# Patient Record
Sex: MALE | Race: WHITE | NOT HISPANIC OR LATINO | Employment: UNEMPLOYED | URBAN - NONMETROPOLITAN AREA
[De-identification: names, ages, dates, MRNs, and addresses within clinical notes are randomized per-mention and may not be internally consistent; named-entity substitution may affect disease eponyms.]

---

## 2018-07-30 ENCOUNTER — TRANSCRIBE ORDERS (OUTPATIENT)
Dept: ADMINISTRATIVE | Facility: HOSPITAL | Age: 53
End: 2018-07-30

## 2018-07-30 DIAGNOSIS — E10.621 TYPE 1 DIABETES MELLITUS WITH FOOT ULCER (HCC): Primary | ICD-10-CM

## 2018-07-30 DIAGNOSIS — L97.509 TYPE 1 DIABETES MELLITUS WITH FOOT ULCER (HCC): Primary | ICD-10-CM

## 2018-08-03 ENCOUNTER — HOSPITAL ENCOUNTER (OUTPATIENT)
Dept: MRI IMAGING | Facility: HOSPITAL | Age: 53
Discharge: HOME | End: 2018-08-03

## 2018-08-09 ENCOUNTER — HOSPITAL ENCOUNTER (OUTPATIENT)
Dept: MRI IMAGING | Facility: HOSPITAL | Age: 53
Discharge: HOME | End: 2018-08-09
Admitting: RADIOLOGY

## 2018-08-09 DIAGNOSIS — E10.621 TYPE 1 DIABETES MELLITUS WITH FOOT ULCER (HCC): ICD-10-CM

## 2018-08-09 DIAGNOSIS — L97.509 TYPE 1 DIABETES MELLITUS WITH FOOT ULCER (HCC): ICD-10-CM

## 2018-08-09 PROCEDURE — 73718 MRI LOWER EXTREMITY W/O DYE: CPT

## 2018-08-09 PROCEDURE — 73718 MRI LOWER EXTREMITY W/O DYE: CPT | Performed by: RADIOLOGY

## 2018-11-30 ENCOUNTER — TRANSCRIBE ORDERS (OUTPATIENT)
Dept: ADMINISTRATIVE | Facility: HOSPITAL | Age: 53
End: 2018-11-30

## 2018-11-30 DIAGNOSIS — M86.171 ACUTE OSTEOMYELITIS OF ANKLE AND FOOT, RIGHT (HCC): Primary | ICD-10-CM

## 2019-02-08 ENCOUNTER — HOSPITAL ENCOUNTER (OUTPATIENT)
Facility: HOSPITAL | Age: 54
Setting detail: HOSPITAL OUTPATIENT SURGERY
Discharge: HOME | End: 2019-02-08
Attending: OPHTHALMOLOGY | Admitting: OPHTHALMOLOGY

## 2019-02-08 ENCOUNTER — ANESTHESIA EVENT (OUTPATIENT)
Dept: PERIOP | Facility: HOSPITAL | Age: 54
End: 2019-02-08

## 2019-02-08 ENCOUNTER — ANESTHESIA (OUTPATIENT)
Dept: PERIOP | Facility: HOSPITAL | Age: 54
End: 2019-02-08

## 2019-02-08 VITALS
BODY MASS INDEX: 24.38 KG/M2 | RESPIRATION RATE: 20 BRPM | HEART RATE: 96 BPM | SYSTOLIC BLOOD PRESSURE: 117 MMHG | DIASTOLIC BLOOD PRESSURE: 69 MMHG | HEIGHT: 72 IN | TEMPERATURE: 98 F | WEIGHT: 180 LBS | OXYGEN SATURATION: 96 %

## 2019-02-08 PROBLEM — H25.11 CATARACT, NUCLEAR SCLEROTIC, RIGHT EYE: Status: ACTIVE | Noted: 2019-02-08

## 2019-02-08 LAB — GLUCOSE BLDC GLUCOMTR-MCNC: 244 MG/DL (ref 70–130)

## 2019-02-08 PROCEDURE — 82962 GLUCOSE BLOOD TEST: CPT

## 2019-02-08 PROCEDURE — V2632 POST CHMBR INTRAOCULAR LENS: HCPCS | Performed by: OPHTHALMOLOGY

## 2019-02-08 PROCEDURE — 25010000002 MIDAZOLAM PER 1 MG: Performed by: NURSE ANESTHETIST, CERTIFIED REGISTERED

## 2019-02-08 PROCEDURE — 25010000002 HYDROMORPHONE PER 4 MG: Performed by: NURSE ANESTHETIST, CERTIFIED REGISTERED

## 2019-02-08 PROCEDURE — 25010000002 KETOROLAC TROMETHAMINE PER 15 MG: Performed by: NURSE ANESTHETIST, CERTIFIED REGISTERED

## 2019-02-08 PROCEDURE — 25010000002 FENTANYL CITRATE (PF) 100 MCG/2ML SOLUTION: Performed by: NURSE ANESTHETIST, CERTIFIED REGISTERED

## 2019-02-08 DEVICE — IMPLANTABLE DEVICE: Type: IMPLANTABLE DEVICE | Site: EYE | Status: FUNCTIONAL

## 2019-02-08 RX ORDER — OXYCODONE HYDROCHLORIDE AND ACETAMINOPHEN 5; 325 MG/1; MG/1
1 TABLET ORAL ONCE AS NEEDED
Status: CANCELLED | OUTPATIENT
Start: 2019-02-08

## 2019-02-08 RX ORDER — BALANCED SALT SOLUTION ENRICHED WITH BICARBONATE, DEXTROSE, AND GLUTATHIONE
KIT INTRAOCULAR AS NEEDED
Status: DISCONTINUED | OUTPATIENT
Start: 2019-02-08 | End: 2019-02-08 | Stop reason: HOSPADM

## 2019-02-08 RX ORDER — TROPICAMIDE 10 MG/ML
1 SOLUTION/ DROPS OPHTHALMIC
Status: COMPLETED | OUTPATIENT
Start: 2019-02-08 | End: 2019-02-08

## 2019-02-08 RX ORDER — IPRATROPIUM BROMIDE AND ALBUTEROL SULFATE 2.5; .5 MG/3ML; MG/3ML
3 SOLUTION RESPIRATORY (INHALATION) ONCE AS NEEDED
Status: CANCELLED | OUTPATIENT
Start: 2019-02-08

## 2019-02-08 RX ORDER — HYDROMORPHONE HCL 110MG/55ML
PATIENT CONTROLLED ANALGESIA SYRINGE INTRAVENOUS AS NEEDED
Status: DISCONTINUED | OUTPATIENT
Start: 2019-02-08 | End: 2019-02-08 | Stop reason: SURG

## 2019-02-08 RX ORDER — MEPERIDINE HYDROCHLORIDE 25 MG/ML
INJECTION INTRAMUSCULAR; INTRAVENOUS; SUBCUTANEOUS AS NEEDED
Status: DISCONTINUED | OUTPATIENT
Start: 2019-02-08 | End: 2019-02-08 | Stop reason: SURG

## 2019-02-08 RX ORDER — MIDAZOLAM HYDROCHLORIDE 1 MG/ML
2 INJECTION INTRAMUSCULAR; INTRAVENOUS
Status: DISCONTINUED | OUTPATIENT
Start: 2019-02-08 | End: 2019-02-08 | Stop reason: HOSPADM

## 2019-02-08 RX ORDER — FENTANYL CITRATE 50 UG/ML
50 INJECTION, SOLUTION INTRAMUSCULAR; INTRAVENOUS
Status: CANCELLED | OUTPATIENT
Start: 2019-02-08

## 2019-02-08 RX ORDER — KETOROLAC TROMETHAMINE 30 MG/ML
INJECTION, SOLUTION INTRAMUSCULAR; INTRAVENOUS AS NEEDED
Status: DISCONTINUED | OUTPATIENT
Start: 2019-02-08 | End: 2019-02-08 | Stop reason: SURG

## 2019-02-08 RX ORDER — DICLOFENAC SODIUM 1 MG/ML
1 SOLUTION/ DROPS OPHTHALMIC ONCE
Status: COMPLETED | OUTPATIENT
Start: 2019-02-08 | End: 2019-02-08

## 2019-02-08 RX ORDER — FENTANYL CITRATE 50 UG/ML
INJECTION, SOLUTION INTRAMUSCULAR; INTRAVENOUS AS NEEDED
Status: DISCONTINUED | OUTPATIENT
Start: 2019-02-08 | End: 2019-02-08 | Stop reason: SURG

## 2019-02-08 RX ORDER — TETRACAINE HYDROCHLORIDE 5 MG/ML
SOLUTION OPHTHALMIC AS NEEDED
Status: DISCONTINUED | OUTPATIENT
Start: 2019-02-08 | End: 2019-02-08 | Stop reason: HOSPADM

## 2019-02-08 RX ORDER — SODIUM CHLORIDE 0.9 % (FLUSH) 0.9 %
3-10 SYRINGE (ML) INJECTION AS NEEDED
Status: DISCONTINUED | OUTPATIENT
Start: 2019-02-08 | End: 2019-02-08 | Stop reason: HOSPADM

## 2019-02-08 RX ORDER — CYCLOPENTOLATE HYDROCHLORIDE 10 MG/ML
1 SOLUTION/ DROPS OPHTHALMIC
Status: COMPLETED | OUTPATIENT
Start: 2019-02-08 | End: 2019-02-08

## 2019-02-08 RX ORDER — TETRACAINE HYDROCHLORIDE 5 MG/ML
1 SOLUTION OPHTHALMIC ONCE
Status: COMPLETED | OUTPATIENT
Start: 2019-02-08 | End: 2019-02-08

## 2019-02-08 RX ORDER — BUPRENORPHINE HYDROCHLORIDE AND NALOXONE HYDROCHLORIDE DIHYDRATE 8; 2 MG/1; MG/1
1 TABLET SUBLINGUAL DAILY
Status: ON HOLD | COMMUNITY
End: 2022-04-26

## 2019-02-08 RX ORDER — CIPROFLOXACIN HYDROCHLORIDE 3.5 MG/ML
1 SOLUTION/ DROPS TOPICAL
Status: COMPLETED | OUTPATIENT
Start: 2019-02-08 | End: 2019-02-08

## 2019-02-08 RX ORDER — PHENYLEPHRINE HCL 2.5 %
1 DROPS OPHTHALMIC (EYE)
Status: COMPLETED | OUTPATIENT
Start: 2019-02-08 | End: 2019-02-08

## 2019-02-08 RX ORDER — MIDAZOLAM HYDROCHLORIDE 1 MG/ML
INJECTION INTRAMUSCULAR; INTRAVENOUS AS NEEDED
Status: DISCONTINUED | OUTPATIENT
Start: 2019-02-08 | End: 2019-02-08 | Stop reason: SURG

## 2019-02-08 RX ORDER — MIDAZOLAM HYDROCHLORIDE 1 MG/ML
1 INJECTION INTRAMUSCULAR; INTRAVENOUS
Status: DISCONTINUED | OUTPATIENT
Start: 2019-02-08 | End: 2019-02-08 | Stop reason: HOSPADM

## 2019-02-08 RX ORDER — LIDOCAINE HYDROCHLORIDE 10 MG/ML
INJECTION, SOLUTION EPIDURAL; INFILTRATION; INTRACAUDAL; PERINEURAL AS NEEDED
Status: DISCONTINUED | OUTPATIENT
Start: 2019-02-08 | End: 2019-02-08 | Stop reason: HOSPADM

## 2019-02-08 RX ORDER — SODIUM CHLORIDE 0.9 % (FLUSH) 0.9 %
3 SYRINGE (ML) INJECTION EVERY 12 HOURS SCHEDULED
Status: DISCONTINUED | OUTPATIENT
Start: 2019-02-08 | End: 2019-02-08 | Stop reason: HOSPADM

## 2019-02-08 RX ORDER — MAGNESIUM HYDROXIDE 1200 MG/15ML
LIQUID ORAL AS NEEDED
Status: DISCONTINUED | OUTPATIENT
Start: 2019-02-08 | End: 2019-02-08 | Stop reason: HOSPADM

## 2019-02-08 RX ORDER — SODIUM CHLORIDE, SODIUM LACTATE, POTASSIUM CHLORIDE, CALCIUM CHLORIDE 600; 310; 30; 20 MG/100ML; MG/100ML; MG/100ML; MG/100ML
125 INJECTION, SOLUTION INTRAVENOUS CONTINUOUS
Status: DISCONTINUED | OUTPATIENT
Start: 2019-02-08 | End: 2019-02-08 | Stop reason: HOSPADM

## 2019-02-08 RX ORDER — SODIUM CHLORIDE 9 MG/ML
INJECTION, SOLUTION INTRAVENOUS CONTINUOUS PRN
Status: DISCONTINUED | OUTPATIENT
Start: 2019-02-08 | End: 2019-02-08 | Stop reason: SURG

## 2019-02-08 RX ORDER — MEPERIDINE HYDROCHLORIDE 25 MG/ML
12.5 INJECTION INTRAMUSCULAR; INTRAVENOUS; SUBCUTANEOUS
Status: CANCELLED | OUTPATIENT
Start: 2019-02-08 | End: 2019-02-09

## 2019-02-08 RX ORDER — LABETALOL HYDROCHLORIDE 5 MG/ML
INJECTION, SOLUTION INTRAVENOUS AS NEEDED
Status: DISCONTINUED | OUTPATIENT
Start: 2019-02-08 | End: 2019-02-08 | Stop reason: SURG

## 2019-02-08 RX ORDER — ERYTHROMYCIN 5 MG/G
OINTMENT OPHTHALMIC AS NEEDED
Status: DISCONTINUED | OUTPATIENT
Start: 2019-02-08 | End: 2019-02-08 | Stop reason: HOSPADM

## 2019-02-08 RX ORDER — ONDANSETRON 2 MG/ML
4 INJECTION INTRAMUSCULAR; INTRAVENOUS ONCE AS NEEDED
Status: CANCELLED | OUTPATIENT
Start: 2019-02-08

## 2019-02-08 RX ADMIN — FENTANYL CITRATE 100 MCG: 50 INJECTION INTRAMUSCULAR; INTRAVENOUS at 08:24

## 2019-02-08 RX ADMIN — FENTANYL CITRATE 100 MCG: 50 INJECTION INTRAMUSCULAR; INTRAVENOUS at 08:54

## 2019-02-08 RX ADMIN — TROPICAMIDE 1 DROP: 10 SOLUTION/ DROPS OPHTHALMIC at 07:48

## 2019-02-08 RX ADMIN — TROPICAMIDE 1 DROP: 10 SOLUTION/ DROPS OPHTHALMIC at 07:43

## 2019-02-08 RX ADMIN — FENTANYL CITRATE 100 MCG: 50 INJECTION INTRAMUSCULAR; INTRAVENOUS at 08:36

## 2019-02-08 RX ADMIN — MIDAZOLAM HYDROCHLORIDE 2 MG: 1 INJECTION, SOLUTION INTRAMUSCULAR; INTRAVENOUS at 08:24

## 2019-02-08 RX ADMIN — FENTANYL CITRATE 100 MCG: 50 INJECTION INTRAMUSCULAR; INTRAVENOUS at 09:14

## 2019-02-08 RX ADMIN — PHENYLEPHRINE HYDROCHLORIDE 1 DROP: 2.5 SOLUTION/ DROPS OPHTHALMIC at 07:48

## 2019-02-08 RX ADMIN — HYDROMORPHONE HYDROCHLORIDE 1 MG: 2 INJECTION, SOLUTION INTRAMUSCULAR; INTRAVENOUS; SUBCUTANEOUS at 09:26

## 2019-02-08 RX ADMIN — SODIUM CHLORIDE: 9 INJECTION, SOLUTION INTRAVENOUS at 08:18

## 2019-02-08 RX ADMIN — CIPROFLOXACIN HYDROCHLORIDE 1 DROP: 3 SOLUTION/ DROPS OPHTHALMIC at 07:48

## 2019-02-08 RX ADMIN — KETOROLAC TROMETHAMINE 30 MG: 30 INJECTION, SOLUTION INTRAMUSCULAR; INTRAVENOUS at 08:58

## 2019-02-08 RX ADMIN — TETRACAINE HYDROCHLORIDE 1 DROP: 5 SOLUTION OPHTHALMIC at 07:38

## 2019-02-08 RX ADMIN — PHENYLEPHRINE HYDROCHLORIDE 1 DROP: 2.5 SOLUTION/ DROPS OPHTHALMIC at 07:43

## 2019-02-08 RX ADMIN — CYCLOPENTOLATE HYDROCHLORIDE 1 DROP: 10 SOLUTION/ DROPS OPHTHALMIC at 07:43

## 2019-02-08 RX ADMIN — CIPROFLOXACIN HYDROCHLORIDE 1 DROP: 3 SOLUTION/ DROPS OPHTHALMIC at 07:43

## 2019-02-08 RX ADMIN — CIPROFLOXACIN HYDROCHLORIDE 1 DROP: 3 SOLUTION/ DROPS OPHTHALMIC at 07:38

## 2019-02-08 RX ADMIN — TROPICAMIDE 1 DROP: 10 SOLUTION/ DROPS OPHTHALMIC at 07:38

## 2019-02-08 RX ADMIN — MEPERIDINE HYDROCHLORIDE 25 MG: 25 INJECTION, SOLUTION INTRAMUSCULAR; INTRAVENOUS; SUBCUTANEOUS at 08:58

## 2019-02-08 RX ADMIN — PHENYLEPHRINE HYDROCHLORIDE 1 DROP: 2.5 SOLUTION/ DROPS OPHTHALMIC at 07:38

## 2019-02-08 RX ADMIN — HYDROMORPHONE HYDROCHLORIDE 1 MG: 2 INJECTION, SOLUTION INTRAMUSCULAR; INTRAVENOUS; SUBCUTANEOUS at 09:19

## 2019-02-08 RX ADMIN — LABETALOL HYDROCHLORIDE 5 MG: 5 INJECTION, SOLUTION INTRAVENOUS at 08:51

## 2019-02-08 RX ADMIN — DICLOFENAC SODIUM 1 DROP: 1 SOLUTION OPHTHALMIC at 07:38

## 2019-02-08 RX ADMIN — CYCLOPENTOLATE HYDROCHLORIDE 1 DROP: 10 SOLUTION/ DROPS OPHTHALMIC at 07:38

## 2019-02-08 NOTE — ANESTHESIA POSTPROCEDURE EVALUATION
Patient: Syed Grajeda    Procedure Summary     Date:  02/08/19 Room / Location:   [facility] 09 /  [facility]    Anesthesia Start:  0818 Anesthesia Stop:  0943    Procedure:  CATARACT PHACO EXTRACTION WITH INTRAOCULAR LENS IMPLANT (Right Eye) Diagnosis:  (H25.13)    Surgeon:  Tan Gonzalez MD Provider:  Austin Gooden MD    Anesthesia Type:  Not recorded ASA Status:  2          Anesthesia Type: No value filed.  Last vitals  BP   117/69 (02/08/19 0956)   Temp   98 °F (36.7 °C) (02/08/19 0945)   Pulse   96 (02/08/19 0956)   Resp   20 (02/08/19 0956)     SpO2   96 % (02/08/19 0956)     Post Anesthesia Care and Evaluation    Patient location during evaluation: PHASE II  Patient participation: complete - patient participated  Level of consciousness: awake and alert  Pain score: 0  Pain management: adequate  Airway patency: patent  Anesthetic complications: No anesthetic complications    Cardiovascular status: acceptable  Respiratory status: acceptable  Hydration status: acceptable

## 2019-02-08 NOTE — OP NOTE
Cataract Surgery Procedure Note     Pre-op diagnosis and indications:  Visually significant ns cataract and posterior subcapsular cataract right eye, pupil miosis    Post-op diagnosis:  Same    Procedure performed:  Extracapsular cataract extraction using phacoemulsification and posterior chamber intra-ocular lens implant right eye, complex requiring iris retractors and capsular staining dye.    Surgeon: Tan Gonzalez M.D.    Anesthesia:  Topical tetracaine drops, intracameral lidocaine, and monitored anesthesia care.    Complications:  No apparent pre-operative, intra-operative, or immediate post-operative complications.    Lens implant used:  Technis PCBOO with dioptric power of 21.5    Specimens:  Not applicable    Blood loss:  None     Blood Products: NA    Grafts or Implants: see above    Description of procedure:     The patient was informed of the risks, indications, and alternative to this elective surgical procedure to perform cataract removal and intraocular lens implantation in the affected eye.  All of the patients and patient's caretakers questions have been answered to their satisfaction and an informed consent form has been reviewed with and signed by the patient and/or caretaker.  The informed consent form is in the chart.    The patient was instructed to clean the eyelashes with baby shampoo each day for several days before surgery and to apply antibiotic ointment to the eyelashes at bedtime for several days before surgery.  The patient was instructed to use an antibiotic drop in the operative eye four times a day for 3 days before surgery.      On the day of surgery, the patient was brought to the pre-operative holding area and given dilating drops (cyclogyl, phenylephrine, and tropicamide) x 3 5 minutes apart.  The patient also received antibiotic drops and voltaren drops x 3 5 minutes apart in the pre-operative area.     The patient was brought to the operative room and placed in the  supine position. The patient was monitored by anesthesia care services throughout the procedure.      After performing the appropriate time-outs, the patient received a betadine prep, cleansing the eyelashes with sterile betadine, followed by cleansing of the periorbital skin with sterile betadine, followed by a few drops of betadine placed in the inferior fornix.  The eye was rinsed with sterile saline and blotted dry with a sterile towel.  The patient was draped in the usual sterile fashion and sterile precautions were used throughout the procedure.    The eye was visualized through the microscope.  The pupil was still miotic despite the dilating drops.  A lid speculum was placed on the eyelids to open the lids.  A drop of sterile tetracaine was once again placed on the eye.  A small paracentesis was created with a 1 mm blade near the limbus.  Through this paracentesis an injection of a small amount 2 % non-preserved lidocaine was given into the anterior chamber with a cannula.  Also, viscoat was given with a cannula into the anterior chamber.  A 2.85 mm keratome blade was used to create a clear corneal tunnel incision near the limbus temporally. Additional viscoat was injected with a cannula into the anterior chamber.  A paracentesis was created near the limbus in each of the 4 quadrants and the greashobber iris retractors were used to dilate the pupil to allow the surgery to be performed.  In order to allow visualization of the capsule, the trypan blue dye was used to stain the anterior capsule and the excess dye was rinsed out of the eye with bss.  Viscoat was again injected with a cannula into the anterior chamber.  A cystitome was used to create a small tear in the central portion of the anterior capsule.  Utrata forceps were used to create an appropriate sized continuous curvilinear capsularhexis.  Gentle hydrodissection and hydrodilineation of the nucleus was performed with sterile balanced salt solution  with a cannula.  The phaco probe was used to carve a central groove in the nucleus.  Viscoat was injected into the grove.  The StepUp nucleus cracker was used to divide the nucleus into two sections.  Each section was gently phacoemulsified using as little phaco energy as possible.  Cortical clean up was then performed with the irrigation and aspiration probe.  An injection of a small amount of  provisc was given into the posterior capsule with a cannula.  A  was used to gently place the selected lens into the posterior capsule. The sue wand was used to gently rotate the lens into the proper position.  The lens appeared to be well centered in the posterior capsular bag.  Each of the 4 greashobber iris retractors was carefully removed.  The irrigation and aspiration probe was used to remove the provisc and viscoat and any remaining cortical material.  A final irrigation of the eye was performed with a cannula using balanced salt solution from the irrigation bottle.      The wound was checked and found to be water tight and secure.  The lid speculum was removed and the drapes were removed.  Tobradex ointment was given to the eye.  A shield was placed over the eye.      The patient was transported to the post-operative recovery room.  The patient tolerated the procedure well and there were no apparent pre-operative, intra-operative, or immediate post-operative complications.      The patient was instructed to follow up in the eye clinic in the afternoon (same day).  The patient was instructed to avoid all strenuous activity and to keep the eye protected with the shield.  The patient was instructed to return to the eye clinic or the emergency room immediately for any problems.

## 2019-02-08 NOTE — ANESTHESIA PREPROCEDURE EVALUATION
Anesthesia Evaluation     no history of anesthetic complications:  NPO Solid Status: > 8 hours  NPO Liquid Status: > 8 hours           Airway   Mallampati: II  TM distance: >3 FB  Neck ROM: full  No difficulty expected  Dental    (+) poor dentition    Pulmonary - normal exam   Cardiovascular - normal exam        Neuro/Psych  GI/Hepatic/Renal/Endo    (+)  hiatal hernia, GERD,  diabetes mellitus type 1 poorly controlled,     Musculoskeletal     Abdominal  - normal exam   Substance History      OB/GYN          Other                        Anesthesia Plan    ASA 2     Anesthetic plan, all risks, benefits, and alternatives have been provided, discussed and informed consent has been obtained with: patient.

## 2019-02-08 NOTE — BRIEF OP NOTE
CATARACT PHACO EXTRACTION WITH INTRAOCULAR LENS IMPLANT  Progress Note    Syed Grajeda  2/8/2019    Pre-op Diagnosis:   H25.13, H25.041       Post-Op Diagnosis Codes:    Same, NS cataract right eye and psc cataract right eye       Procedure/CPT® Codes:      Procedure(s):  CATARACT PHACO EXTRACTION WITH INTRAOCULAR LENS IMPLANT    Surgeon(s):  Tan Gonzlaez MD    Anesthesia: Choice    Staff:   Circulator: Emilia Leong RN  Scrub Person: Mary Reyes; Criss Tamayo    Estimated Blood Loss: none    Urine Voided: * No values recorded between 2/8/2019  8:19 AM and 2/8/2019  9:35 AM *    Specimens:                None      Drains:      Findings: see op report    Complications: stable      Tan Gonzalez MD     Date: 2/8/2019  Time: 9:39 AM

## 2022-04-25 ENCOUNTER — PREP FOR SURGERY (OUTPATIENT)
Dept: OTHER | Facility: HOSPITAL | Age: 57
End: 2022-04-25

## 2022-04-25 ENCOUNTER — HOSPITAL ENCOUNTER (INPATIENT)
Facility: HOSPITAL | Age: 57
LOS: 18 days | Discharge: HOME HEALTH | End: 2022-05-13
Attending: INTERNAL MEDICINE | Admitting: INTERNAL MEDICINE

## 2022-04-25 DIAGNOSIS — Z89.611 S/P AKA (ABOVE KNEE AMPUTATION) UNILATERAL, RIGHT: ICD-10-CM

## 2022-04-25 DIAGNOSIS — Z89.611 S/P AKA (ABOVE KNEE AMPUTATION) UNILATERAL, RIGHT: Primary | ICD-10-CM

## 2022-04-25 DIAGNOSIS — H25.11 CATARACT, NUCLEAR SCLEROTIC, RIGHT EYE: Primary | ICD-10-CM

## 2022-04-25 LAB
GLUCOSE BLDC GLUCOMTR-MCNC: 126 MG/DL (ref 70–130)
GLUCOSE BLDC GLUCOMTR-MCNC: 129 MG/DL (ref 70–130)

## 2022-04-25 PROCEDURE — 25010000002 HEPARIN (PORCINE) PER 1000 UNITS: Performed by: INTERNAL MEDICINE

## 2022-04-25 PROCEDURE — 63710000001 ONDANSETRON PER 8 MG: Performed by: INTERNAL MEDICINE

## 2022-04-25 PROCEDURE — 82962 GLUCOSE BLOOD TEST: CPT

## 2022-04-25 RX ORDER — BISACODYL 10 MG
10 SUPPOSITORY, RECTAL RECTAL DAILY PRN
Status: DISCONTINUED | OUTPATIENT
Start: 2022-04-25 | End: 2022-05-13 | Stop reason: HOSPADM

## 2022-04-25 RX ORDER — GABAPENTIN 100 MG/1
200 CAPSULE ORAL EVERY 12 HOURS SCHEDULED
Status: CANCELLED | OUTPATIENT
Start: 2022-04-25

## 2022-04-25 RX ORDER — NICOTINE POLACRILEX 4 MG
15 LOZENGE BUCCAL
Status: CANCELLED | OUTPATIENT
Start: 2022-04-25

## 2022-04-25 RX ORDER — HEPARIN SODIUM 5000 [USP'U]/ML
5000 INJECTION, SOLUTION INTRAVENOUS; SUBCUTANEOUS EVERY 12 HOURS SCHEDULED
Status: CANCELLED | OUTPATIENT
Start: 2022-04-25

## 2022-04-25 RX ORDER — HEPARIN SODIUM 5000 [USP'U]/ML
5000 INJECTION, SOLUTION INTRAVENOUS; SUBCUTANEOUS EVERY 12 HOURS SCHEDULED
Status: DISCONTINUED | OUTPATIENT
Start: 2022-04-25 | End: 2022-05-13 | Stop reason: HOSPADM

## 2022-04-25 RX ORDER — ACETAMINOPHEN 325 MG/1
650 TABLET ORAL EVERY 4 HOURS PRN
Status: CANCELLED | OUTPATIENT
Start: 2022-04-25

## 2022-04-25 RX ORDER — GABAPENTIN 100 MG/1
200 CAPSULE ORAL EVERY 12 HOURS SCHEDULED
Status: DISCONTINUED | OUTPATIENT
Start: 2022-04-25 | End: 2022-04-26

## 2022-04-25 RX ORDER — POLYETHYLENE GLYCOL 3350 17 G/17G
17 POWDER, FOR SOLUTION ORAL DAILY PRN
Status: DISCONTINUED | OUTPATIENT
Start: 2022-04-25 | End: 2022-05-13 | Stop reason: HOSPADM

## 2022-04-25 RX ORDER — FAMOTIDINE 20 MG/1
20 TABLET, FILM COATED ORAL DAILY
Status: CANCELLED | OUTPATIENT
Start: 2022-04-25

## 2022-04-25 RX ORDER — POLYETHYLENE GLYCOL 3350 17 G/17G
17 POWDER, FOR SOLUTION ORAL DAILY PRN
Status: CANCELLED | OUTPATIENT
Start: 2022-04-25

## 2022-04-25 RX ORDER — MULTIPLE VITAMINS W/ MINERALS TAB 9MG-400MCG
1 TAB ORAL DAILY
Status: DISCONTINUED | OUTPATIENT
Start: 2022-04-26 | End: 2022-05-13 | Stop reason: HOSPADM

## 2022-04-25 RX ORDER — CALCIUM CARBONATE 200(500)MG
1 TABLET,CHEWABLE ORAL 2 TIMES DAILY PRN
Status: DISCONTINUED | OUTPATIENT
Start: 2022-04-25 | End: 2022-05-13 | Stop reason: HOSPADM

## 2022-04-25 RX ORDER — NICOTINE POLACRILEX 4 MG
15 LOZENGE BUCCAL
Status: DISCONTINUED | OUTPATIENT
Start: 2022-04-25 | End: 2022-04-26

## 2022-04-25 RX ORDER — ASPIRIN 81 MG/1
81 TABLET, CHEWABLE ORAL DAILY
Status: DISCONTINUED | OUTPATIENT
Start: 2022-04-26 | End: 2022-05-13 | Stop reason: HOSPADM

## 2022-04-25 RX ORDER — BUPRENORPHINE HYDROCHLORIDE AND NALOXONE HYDROCHLORIDE DIHYDRATE 8; 2 MG/1; MG/1
2 TABLET SUBLINGUAL DAILY
Status: COMPLETED | OUTPATIENT
Start: 2022-04-26 | End: 2022-05-08

## 2022-04-25 RX ORDER — FAMOTIDINE 20 MG/1
20 TABLET, FILM COATED ORAL DAILY
Status: DISCONTINUED | OUTPATIENT
Start: 2022-04-26 | End: 2022-05-13 | Stop reason: HOSPADM

## 2022-04-25 RX ORDER — FERROUS SULFATE 325(65) MG
325 TABLET ORAL 2 TIMES DAILY WITH MEALS
Status: DISCONTINUED | OUTPATIENT
Start: 2022-04-25 | End: 2022-05-13 | Stop reason: HOSPADM

## 2022-04-25 RX ORDER — ONDANSETRON 4 MG/1
4 TABLET, FILM COATED ORAL EVERY 6 HOURS PRN
Status: DISCONTINUED | OUTPATIENT
Start: 2022-04-25 | End: 2022-05-13 | Stop reason: HOSPADM

## 2022-04-25 RX ORDER — DEXTROSE MONOHYDRATE 25 G/50ML
25 INJECTION, SOLUTION INTRAVENOUS
Status: DISCONTINUED | OUTPATIENT
Start: 2022-04-25 | End: 2022-04-26

## 2022-04-25 RX ORDER — ASPIRIN 81 MG/1
81 TABLET, CHEWABLE ORAL DAILY
Status: CANCELLED | OUTPATIENT
Start: 2022-04-25

## 2022-04-25 RX ORDER — AMOXICILLIN AND CLAVULANATE POTASSIUM 875; 125 MG/1; MG/1
1 TABLET, FILM COATED ORAL EVERY 12 HOURS SCHEDULED
Status: DISCONTINUED | OUTPATIENT
Start: 2022-04-25 | End: 2022-04-28

## 2022-04-25 RX ORDER — ACETAMINOPHEN 325 MG/1
650 TABLET ORAL EVERY 4 HOURS PRN
Status: DISCONTINUED | OUTPATIENT
Start: 2022-04-25 | End: 2022-05-13 | Stop reason: HOSPADM

## 2022-04-25 RX ORDER — FERROUS SULFATE 325(65) MG
325 TABLET ORAL 2 TIMES DAILY WITH MEALS
Status: CANCELLED | OUTPATIENT
Start: 2022-04-25

## 2022-04-25 RX ORDER — DEXTROSE MONOHYDRATE 25 G/50ML
25 INJECTION, SOLUTION INTRAVENOUS
Status: CANCELLED | OUTPATIENT
Start: 2022-04-25

## 2022-04-25 RX ORDER — METHOCARBAMOL 750 MG/1
750 TABLET, FILM COATED ORAL EVERY 8 HOURS PRN
Status: DISCONTINUED | OUTPATIENT
Start: 2022-04-25 | End: 2022-04-28

## 2022-04-25 RX ORDER — FLUCONAZOLE 100 MG/1
100 TABLET ORAL EVERY 24 HOURS
Status: DISCONTINUED | OUTPATIENT
Start: 2022-04-26 | End: 2022-04-28

## 2022-04-25 RX ORDER — BUPRENORPHINE HYDROCHLORIDE AND NALOXONE HYDROCHLORIDE DIHYDRATE 8; 2 MG/1; MG/1
2 TABLET SUBLINGUAL DAILY
Status: CANCELLED | OUTPATIENT
Start: 2022-04-25 | End: 2022-05-02

## 2022-04-25 RX ORDER — ATORVASTATIN CALCIUM 40 MG/1
40 TABLET, FILM COATED ORAL NIGHTLY
Status: CANCELLED | OUTPATIENT
Start: 2022-04-25

## 2022-04-25 RX ORDER — CALCIUM CARBONATE 200(500)MG
1 TABLET,CHEWABLE ORAL 2 TIMES DAILY PRN
Status: CANCELLED | OUTPATIENT
Start: 2022-04-25

## 2022-04-25 RX ORDER — MULTIPLE VITAMINS W/ MINERALS TAB 9MG-400MCG
1 TAB ORAL DAILY
Status: CANCELLED | OUTPATIENT
Start: 2022-04-25

## 2022-04-25 RX ORDER — FLUCONAZOLE 100 MG/1
100 TABLET ORAL EVERY 24 HOURS
Status: CANCELLED | OUTPATIENT
Start: 2022-04-25 | End: 2022-04-28

## 2022-04-25 RX ORDER — ONDANSETRON 4 MG/1
4 TABLET, FILM COATED ORAL EVERY 6 HOURS PRN
Status: CANCELLED | OUTPATIENT
Start: 2022-04-25

## 2022-04-25 RX ORDER — METHOCARBAMOL 750 MG/1
750 TABLET, FILM COATED ORAL EVERY 8 HOURS PRN
Status: CANCELLED | OUTPATIENT
Start: 2022-04-25

## 2022-04-25 RX ORDER — BISACODYL 10 MG
10 SUPPOSITORY, RECTAL RECTAL DAILY PRN
Status: CANCELLED | OUTPATIENT
Start: 2022-04-25

## 2022-04-25 RX ORDER — AMOXICILLIN AND CLAVULANATE POTASSIUM 875; 125 MG/1; MG/1
1 TABLET, FILM COATED ORAL EVERY 12 HOURS SCHEDULED
Status: CANCELLED | OUTPATIENT
Start: 2022-04-25 | End: 2022-04-28

## 2022-04-25 RX ORDER — ATORVASTATIN CALCIUM 40 MG/1
40 TABLET, FILM COATED ORAL NIGHTLY
Status: DISCONTINUED | OUTPATIENT
Start: 2022-04-25 | End: 2022-05-13 | Stop reason: HOSPADM

## 2022-04-25 RX ADMIN — GABAPENTIN 200 MG: 100 CAPSULE ORAL at 20:13

## 2022-04-25 RX ADMIN — METOPROLOL TARTRATE 25 MG: 25 TABLET, FILM COATED ORAL at 20:13

## 2022-04-25 RX ADMIN — AMOXICILLIN AND CLAVULANATE POTASSIUM 1 TABLET: 875; 125 TABLET, FILM COATED ORAL at 20:11

## 2022-04-25 RX ADMIN — FERROUS SULFATE TAB 325 MG (65 MG ELEMENTAL FE) 325 MG: 325 (65 FE) TAB at 17:19

## 2022-04-25 RX ADMIN — ATORVASTATIN CALCIUM 40 MG: 40 TABLET, FILM COATED ORAL at 20:12

## 2022-04-25 RX ADMIN — HEPARIN SODIUM 5000 UNITS: 5000 INJECTION INTRAVENOUS; SUBCUTANEOUS at 20:14

## 2022-04-25 RX ADMIN — ONDANSETRON HYDROCHLORIDE 4 MG: 4 TABLET, FILM COATED ORAL at 16:13

## 2022-04-26 LAB
ALBUMIN SERPL-MCNC: 1.51 G/DL (ref 3.5–5.2)
ALBUMIN/GLOB SERPL: 0.3 G/DL
ALP SERPL-CCNC: 159 U/L (ref 39–117)
ALT SERPL W P-5'-P-CCNC: 39 U/L (ref 1–41)
ANION GAP SERPL CALCULATED.3IONS-SCNC: 6.4 MMOL/L (ref 5–15)
AST SERPL-CCNC: 55 U/L (ref 1–40)
BASOPHILS # BLD AUTO: 0.03 10*3/MM3 (ref 0–0.2)
BASOPHILS NFR BLD AUTO: 0.5 % (ref 0–1.5)
BILIRUB SERPL-MCNC: 0.2 MG/DL (ref 0–1.2)
BUN SERPL-MCNC: 5 MG/DL (ref 6–20)
BUN/CREAT SERPL: 6 (ref 7–25)
CALCIUM SPEC-SCNC: 7.5 MG/DL (ref 8.6–10.5)
CHLORIDE SERPL-SCNC: 104 MMOL/L (ref 98–107)
CO2 SERPL-SCNC: 28.6 MMOL/L (ref 22–29)
CREAT SERPL-MCNC: 0.83 MG/DL (ref 0.76–1.27)
DEPRECATED RDW RBC AUTO: 50.6 FL (ref 37–54)
EGFRCR SERPLBLD CKD-EPI 2021: 104 ML/MIN/1.73
EOSINOPHIL # BLD AUTO: 0.02 10*3/MM3 (ref 0–0.4)
EOSINOPHIL NFR BLD AUTO: 0.4 % (ref 0.3–6.2)
ERYTHROCYTE [DISTWIDTH] IN BLOOD BY AUTOMATED COUNT: 14.9 % (ref 12.3–15.4)
GLOBULIN UR ELPH-MCNC: 4.4 GM/DL
GLUCOSE BLDC GLUCOMTR-MCNC: 84 MG/DL (ref 70–130)
GLUCOSE BLDC GLUCOMTR-MCNC: 85 MG/DL (ref 70–130)
GLUCOSE SERPL-MCNC: 101 MG/DL (ref 65–99)
HCT VFR BLD AUTO: 23.1 % (ref 37.5–51)
HGB BLD-MCNC: 7 G/DL (ref 13–17.7)
IMM GRANULOCYTES # BLD AUTO: 0.02 10*3/MM3 (ref 0–0.05)
IMM GRANULOCYTES NFR BLD AUTO: 0.4 % (ref 0–0.5)
LYMPHOCYTES # BLD AUTO: 1.69 10*3/MM3 (ref 0.7–3.1)
LYMPHOCYTES NFR BLD AUTO: 30.7 % (ref 19.6–45.3)
MAGNESIUM SERPL-MCNC: 1.9 MG/DL (ref 1.6–2.6)
MCH RBC QN AUTO: 27.9 PG (ref 26.6–33)
MCHC RBC AUTO-ENTMCNC: 30.3 G/DL (ref 31.5–35.7)
MCV RBC AUTO: 92 FL (ref 79–97)
MONOCYTES # BLD AUTO: 0.34 10*3/MM3 (ref 0.1–0.9)
MONOCYTES NFR BLD AUTO: 6.2 % (ref 5–12)
NEUTROPHILS NFR BLD AUTO: 3.4 10*3/MM3 (ref 1.7–7)
NEUTROPHILS NFR BLD AUTO: 61.8 % (ref 42.7–76)
NRBC BLD AUTO-RTO: 0 /100 WBC (ref 0–0.2)
PLATELET # BLD AUTO: 369 10*3/MM3 (ref 140–450)
PMV BLD AUTO: 10.4 FL (ref 6–12)
POTASSIUM SERPL-SCNC: 4.2 MMOL/L (ref 3.5–5.2)
PROT SERPL-MCNC: 5.9 G/DL (ref 6–8.5)
RBC # BLD AUTO: 2.51 10*6/MM3 (ref 4.14–5.8)
SODIUM SERPL-SCNC: 139 MMOL/L (ref 136–145)
WBC NRBC COR # BLD: 5.5 10*3/MM3 (ref 3.4–10.8)

## 2022-04-26 PROCEDURE — 63710000001 ONDANSETRON PER 8 MG: Performed by: INTERNAL MEDICINE

## 2022-04-26 PROCEDURE — C1751 CATH, INF, PER/CENT/MIDLINE: HCPCS

## 2022-04-26 PROCEDURE — 97535 SELF CARE MNGMENT TRAINING: CPT

## 2022-04-26 PROCEDURE — 97530 THERAPEUTIC ACTIVITIES: CPT

## 2022-04-26 PROCEDURE — 25010000002 HEPARIN (PORCINE) PER 1000 UNITS: Performed by: INTERNAL MEDICINE

## 2022-04-26 PROCEDURE — 82962 GLUCOSE BLOOD TEST: CPT

## 2022-04-26 PROCEDURE — 36410 VNPNXR 3YR/> PHY/QHP DX/THER: CPT

## 2022-04-26 PROCEDURE — 83735 ASSAY OF MAGNESIUM: CPT | Performed by: INTERNAL MEDICINE

## 2022-04-26 PROCEDURE — 97167 OT EVAL HIGH COMPLEX 60 MIN: CPT

## 2022-04-26 PROCEDURE — 97161 PT EVAL LOW COMPLEX 20 MIN: CPT

## 2022-04-26 PROCEDURE — 85025 COMPLETE CBC W/AUTO DIFF WBC: CPT | Performed by: INTERNAL MEDICINE

## 2022-04-26 PROCEDURE — 80053 COMPREHEN METABOLIC PANEL: CPT | Performed by: INTERNAL MEDICINE

## 2022-04-26 RX ORDER — TAMSULOSIN HYDROCHLORIDE 0.4 MG/1
1 CAPSULE ORAL DAILY
COMMUNITY
End: 2022-05-13 | Stop reason: HOSPADM

## 2022-04-26 RX ORDER — BUPRENORPHINE HYDROCHLORIDE AND NALOXONE HYDROCHLORIDE DIHYDRATE 8; 2 MG/1; MG/1
2 TABLET SUBLINGUAL DAILY
COMMUNITY

## 2022-04-26 RX ORDER — PANTOPRAZOLE SODIUM 40 MG/1
40 TABLET, DELAYED RELEASE ORAL DAILY
COMMUNITY
End: 2022-05-13 | Stop reason: HOSPADM

## 2022-04-26 RX ORDER — FERROUS SULFATE 325(65) MG
325 TABLET ORAL 2 TIMES DAILY
COMMUNITY

## 2022-04-26 RX ORDER — INSULIN ASPART 100 [IU]/ML
0-7 INJECTION, SOLUTION INTRAVENOUS; SUBCUTANEOUS
Status: DISCONTINUED | OUTPATIENT
Start: 2022-04-26 | End: 2022-04-26

## 2022-04-26 RX ORDER — ATORVASTATIN CALCIUM 40 MG/1
40 TABLET, FILM COATED ORAL DAILY
COMMUNITY

## 2022-04-26 RX ORDER — INSULIN GLARGINE 100 [IU]/ML
20 INJECTION, SOLUTION SUBCUTANEOUS NIGHTLY
COMMUNITY
End: 2022-05-13 | Stop reason: HOSPADM

## 2022-04-26 RX ORDER — GABAPENTIN 300 MG/1
300 CAPSULE ORAL DAILY
COMMUNITY
Start: 2022-04-22 | End: 2022-05-13 | Stop reason: HOSPADM

## 2022-04-26 RX ORDER — GABAPENTIN 100 MG/1
100 CAPSULE ORAL EVERY 12 HOURS SCHEDULED
Status: DISCONTINUED | OUTPATIENT
Start: 2022-04-26 | End: 2022-04-28

## 2022-04-26 RX ORDER — HYDROXYZINE PAMOATE 25 MG/1
25 CAPSULE ORAL 4 TIMES DAILY PRN
COMMUNITY
End: 2022-05-13 | Stop reason: HOSPADM

## 2022-04-26 RX ORDER — CHOLESTYRAMINE LIGHT 4 G/5.7G
4 POWDER, FOR SUSPENSION ORAL
COMMUNITY
End: 2022-05-13 | Stop reason: HOSPADM

## 2022-04-26 RX ADMIN — GABAPENTIN 200 MG: 100 CAPSULE ORAL at 09:01

## 2022-04-26 RX ADMIN — FERROUS SULFATE TAB 325 MG (65 MG ELEMENTAL FE) 325 MG: 325 (65 FE) TAB at 08:57

## 2022-04-26 RX ADMIN — AMOXICILLIN AND CLAVULANATE POTASSIUM 1 TABLET: 875; 125 TABLET, FILM COATED ORAL at 08:57

## 2022-04-26 RX ADMIN — HEPARIN SODIUM 5000 UNITS: 5000 INJECTION INTRAVENOUS; SUBCUTANEOUS at 20:06

## 2022-04-26 RX ADMIN — AMOXICILLIN AND CLAVULANATE POTASSIUM 1 TABLET: 875; 125 TABLET, FILM COATED ORAL at 20:03

## 2022-04-26 RX ADMIN — ONDANSETRON HYDROCHLORIDE 4 MG: 4 TABLET, FILM COATED ORAL at 11:56

## 2022-04-26 RX ADMIN — BUPRENORPHINE HYDROCHLORIDE AND NALOXONE HYDROCHLORIDE DIHYDRATE 2 TABLET: 8; 2 TABLET SUBLINGUAL at 09:01

## 2022-04-26 RX ADMIN — HEPARIN SODIUM 5000 UNITS: 5000 INJECTION INTRAVENOUS; SUBCUTANEOUS at 08:58

## 2022-04-26 RX ADMIN — ATORVASTATIN CALCIUM 40 MG: 40 TABLET, FILM COATED ORAL at 20:04

## 2022-04-26 RX ADMIN — FERROUS SULFATE TAB 325 MG (65 MG ELEMENTAL FE) 325 MG: 325 (65 FE) TAB at 17:19

## 2022-04-26 RX ADMIN — SODIUM CHLORIDE 500 ML: 9 INJECTION, SOLUTION INTRAVENOUS at 18:06

## 2022-04-26 RX ADMIN — METOPROLOL TARTRATE 25 MG: 25 TABLET, FILM COATED ORAL at 20:05

## 2022-04-26 RX ADMIN — FLUCONAZOLE 100 MG: 100 TABLET ORAL at 08:57

## 2022-04-26 RX ADMIN — ASPIRIN 81 MG: 81 TABLET, CHEWABLE ORAL at 08:57

## 2022-04-26 RX ADMIN — GABAPENTIN 100 MG: 100 CAPSULE ORAL at 20:03

## 2022-04-26 RX ADMIN — Medication 1 TABLET: at 08:58

## 2022-04-26 RX ADMIN — FAMOTIDINE 20 MG: 20 TABLET, FILM COATED ORAL at 08:57

## 2022-04-27 ENCOUNTER — APPOINTMENT (OUTPATIENT)
Dept: ULTRASOUND IMAGING | Facility: HOSPITAL | Age: 57
End: 2022-04-27

## 2022-04-27 PROCEDURE — 97530 THERAPEUTIC ACTIVITIES: CPT

## 2022-04-27 PROCEDURE — 97112 NEUROMUSCULAR REEDUCATION: CPT

## 2022-04-27 PROCEDURE — 97535 SELF CARE MNGMENT TRAINING: CPT

## 2022-04-27 PROCEDURE — 97110 THERAPEUTIC EXERCISES: CPT

## 2022-04-27 PROCEDURE — 76705 ECHO EXAM OF ABDOMEN: CPT | Performed by: RADIOLOGY

## 2022-04-27 PROCEDURE — 76705 ECHO EXAM OF ABDOMEN: CPT

## 2022-04-27 PROCEDURE — 63710000001 ONDANSETRON PER 8 MG: Performed by: INTERNAL MEDICINE

## 2022-04-27 PROCEDURE — 25010000002 HEPARIN (PORCINE) PER 1000 UNITS: Performed by: INTERNAL MEDICINE

## 2022-04-27 RX ORDER — PROCHLORPERAZINE MALEATE 5 MG/1
5 TABLET ORAL EVERY 6 HOURS PRN
Status: DISCONTINUED | OUTPATIENT
Start: 2022-04-27 | End: 2022-04-30

## 2022-04-27 RX ADMIN — ATORVASTATIN CALCIUM 40 MG: 40 TABLET, FILM COATED ORAL at 20:56

## 2022-04-27 RX ADMIN — FERROUS SULFATE TAB 325 MG (65 MG ELEMENTAL FE) 325 MG: 325 (65 FE) TAB at 17:05

## 2022-04-27 RX ADMIN — HEPARIN SODIUM 5000 UNITS: 5000 INJECTION INTRAVENOUS; SUBCUTANEOUS at 20:57

## 2022-04-27 RX ADMIN — FAMOTIDINE 20 MG: 20 TABLET, FILM COATED ORAL at 08:57

## 2022-04-27 RX ADMIN — HEPARIN SODIUM 5000 UNITS: 5000 INJECTION INTRAVENOUS; SUBCUTANEOUS at 08:57

## 2022-04-27 RX ADMIN — SODIUM CHLORIDE 500 ML: 9 INJECTION, SOLUTION INTRAVENOUS at 06:30

## 2022-04-27 RX ADMIN — ASPIRIN 81 MG: 81 TABLET, CHEWABLE ORAL at 08:57

## 2022-04-27 RX ADMIN — ONDANSETRON HYDROCHLORIDE 4 MG: 4 TABLET, FILM COATED ORAL at 16:36

## 2022-04-27 RX ADMIN — GABAPENTIN 100 MG: 100 CAPSULE ORAL at 20:56

## 2022-04-27 RX ADMIN — FERROUS SULFATE TAB 325 MG (65 MG ELEMENTAL FE) 325 MG: 325 (65 FE) TAB at 08:57

## 2022-04-27 RX ADMIN — Medication 1 TABLET: at 08:57

## 2022-04-27 RX ADMIN — FLUCONAZOLE 100 MG: 100 TABLET ORAL at 08:57

## 2022-04-27 RX ADMIN — METOPROLOL TARTRATE 25 MG: 25 TABLET, FILM COATED ORAL at 20:56

## 2022-04-27 RX ADMIN — BUPRENORPHINE HYDROCHLORIDE AND NALOXONE HYDROCHLORIDE DIHYDRATE 2 TABLET: 8; 2 TABLET SUBLINGUAL at 08:57

## 2022-04-27 RX ADMIN — ONDANSETRON HYDROCHLORIDE 4 MG: 4 TABLET, FILM COATED ORAL at 08:57

## 2022-04-27 RX ADMIN — AMOXICILLIN AND CLAVULANATE POTASSIUM 1 TABLET: 875; 125 TABLET, FILM COATED ORAL at 08:57

## 2022-04-27 RX ADMIN — AMOXICILLIN AND CLAVULANATE POTASSIUM 1 TABLET: 875; 125 TABLET, FILM COATED ORAL at 20:56

## 2022-04-27 RX ADMIN — GABAPENTIN 100 MG: 100 CAPSULE ORAL at 08:57

## 2022-04-28 ENCOUNTER — APPOINTMENT (OUTPATIENT)
Dept: CT IMAGING | Facility: HOSPITAL | Age: 57
End: 2022-04-28

## 2022-04-28 LAB
ABO GROUP BLD: NORMAL
ABO GROUP BLD: NORMAL
ALBUMIN SERPL-MCNC: 1.59 G/DL (ref 3.5–5.2)
ALBUMIN/GLOB SERPL: 0.4 G/DL
ALP SERPL-CCNC: 151 U/L (ref 39–117)
ALT SERPL W P-5'-P-CCNC: 32 U/L (ref 1–41)
ANION GAP SERPL CALCULATED.3IONS-SCNC: 4.9 MMOL/L (ref 5–15)
AST SERPL-CCNC: 36 U/L (ref 1–40)
BASOPHILS # BLD AUTO: 0.02 10*3/MM3 (ref 0–0.2)
BASOPHILS NFR BLD AUTO: 0.4 % (ref 0–1.5)
BH BB BLOOD EXPIRATION DATE: NORMAL
BH BB BLOOD TYPE BARCODE: 6200
BH BB DISPENSE STATUS: NORMAL
BH BB PRODUCT CODE: NORMAL
BH BB UNIT NUMBER: NORMAL
BILIRUB SERPL-MCNC: 0.2 MG/DL (ref 0–1.2)
BLD GP AB SCN SERPL QL: NEGATIVE
BUN SERPL-MCNC: 7 MG/DL (ref 6–20)
BUN/CREAT SERPL: 8.3 (ref 7–25)
CALCIUM SPEC-SCNC: 7.3 MG/DL (ref 8.6–10.5)
CHLORIDE SERPL-SCNC: 106 MMOL/L (ref 98–107)
CO2 SERPL-SCNC: 29.1 MMOL/L (ref 22–29)
CREAT SERPL-MCNC: 0.84 MG/DL (ref 0.76–1.27)
CROSSMATCH INTERPRETATION: NORMAL
DEPRECATED RDW RBC AUTO: 50.6 FL (ref 37–54)
EGFRCR SERPLBLD CKD-EPI 2021: 103.6 ML/MIN/1.73
EOSINOPHIL # BLD AUTO: 0.04 10*3/MM3 (ref 0–0.4)
EOSINOPHIL NFR BLD AUTO: 0.9 % (ref 0.3–6.2)
ERYTHROCYTE [DISTWIDTH] IN BLOOD BY AUTOMATED COUNT: 14.9 % (ref 12.3–15.4)
GLOBULIN UR ELPH-MCNC: 4.1 GM/DL
GLUCOSE SERPL-MCNC: 84 MG/DL (ref 65–99)
HCT VFR BLD AUTO: 19.7 % (ref 37.5–51)
HCT VFR BLD AUTO: 25.4 % (ref 37.5–51)
HGB BLD-MCNC: 6.1 G/DL (ref 13–17.7)
HGB BLD-MCNC: 7.7 G/DL (ref 13–17.7)
IMM GRANULOCYTES # BLD AUTO: 0.01 10*3/MM3 (ref 0–0.05)
IMM GRANULOCYTES NFR BLD AUTO: 0.2 % (ref 0–0.5)
LYMPHOCYTES # BLD AUTO: 2.01 10*3/MM3 (ref 0.7–3.1)
LYMPHOCYTES NFR BLD AUTO: 43.1 % (ref 19.6–45.3)
MCH RBC QN AUTO: 28.6 PG (ref 26.6–33)
MCHC RBC AUTO-ENTMCNC: 31 G/DL (ref 31.5–35.7)
MCV RBC AUTO: 92.5 FL (ref 79–97)
MONOCYTES # BLD AUTO: 0.39 10*3/MM3 (ref 0.1–0.9)
MONOCYTES NFR BLD AUTO: 8.4 % (ref 5–12)
NEUTROPHILS NFR BLD AUTO: 2.19 10*3/MM3 (ref 1.7–7)
NEUTROPHILS NFR BLD AUTO: 47 % (ref 42.7–76)
NRBC BLD AUTO-RTO: 0 /100 WBC (ref 0–0.2)
PLATELET # BLD AUTO: 496 10*3/MM3 (ref 140–450)
PMV BLD AUTO: 9.3 FL (ref 6–12)
POTASSIUM SERPL-SCNC: 3.8 MMOL/L (ref 3.5–5.2)
PROT SERPL-MCNC: 5.7 G/DL (ref 6–8.5)
RBC # BLD AUTO: 2.13 10*6/MM3 (ref 4.14–5.8)
RH BLD: POSITIVE
RH BLD: POSITIVE
SODIUM SERPL-SCNC: 140 MMOL/L (ref 136–145)
T&S EXPIRATION DATE: NORMAL
UNIT  ABO: NORMAL
UNIT  RH: NORMAL
WBC NRBC COR # BLD: 4.66 10*3/MM3 (ref 3.4–10.8)

## 2022-04-28 PROCEDURE — 97530 THERAPEUTIC ACTIVITIES: CPT

## 2022-04-28 PROCEDURE — 80053 COMPREHEN METABOLIC PANEL: CPT | Performed by: INTERNAL MEDICINE

## 2022-04-28 PROCEDURE — 85014 HEMATOCRIT: CPT | Performed by: INTERNAL MEDICINE

## 2022-04-28 PROCEDURE — 97110 THERAPEUTIC EXERCISES: CPT

## 2022-04-28 PROCEDURE — 97112 NEUROMUSCULAR REEDUCATION: CPT

## 2022-04-28 PROCEDURE — 25010000002 HEPARIN (PORCINE) PER 1000 UNITS: Performed by: INTERNAL MEDICINE

## 2022-04-28 PROCEDURE — 74176 CT ABD & PELVIS W/O CONTRAST: CPT | Performed by: RADIOLOGY

## 2022-04-28 PROCEDURE — 86900 BLOOD TYPING SEROLOGIC ABO: CPT

## 2022-04-28 PROCEDURE — 86901 BLOOD TYPING SEROLOGIC RH(D): CPT

## 2022-04-28 PROCEDURE — 86901 BLOOD TYPING SEROLOGIC RH(D): CPT | Performed by: INTERNAL MEDICINE

## 2022-04-28 PROCEDURE — 36430 TRANSFUSION BLD/BLD COMPNT: CPT

## 2022-04-28 PROCEDURE — 86850 RBC ANTIBODY SCREEN: CPT | Performed by: INTERNAL MEDICINE

## 2022-04-28 PROCEDURE — 85018 HEMOGLOBIN: CPT | Performed by: INTERNAL MEDICINE

## 2022-04-28 PROCEDURE — 63710000001 PROCHLORPERAZINE MALEATE PER 5 MG: Performed by: INTERNAL MEDICINE

## 2022-04-28 PROCEDURE — 86900 BLOOD TYPING SEROLOGIC ABO: CPT | Performed by: INTERNAL MEDICINE

## 2022-04-28 PROCEDURE — 74176 CT ABD & PELVIS W/O CONTRAST: CPT

## 2022-04-28 PROCEDURE — 86923 COMPATIBILITY TEST ELECTRIC: CPT

## 2022-04-28 PROCEDURE — 85025 COMPLETE CBC W/AUTO DIFF WBC: CPT | Performed by: INTERNAL MEDICINE

## 2022-04-28 PROCEDURE — 97535 SELF CARE MNGMENT TRAINING: CPT

## 2022-04-28 PROCEDURE — P9016 RBC LEUKOCYTES REDUCED: HCPCS

## 2022-04-28 RX ORDER — SODIUM CHLORIDE 9 MG/ML
INJECTION, SOLUTION INTRAVENOUS
Status: COMPLETED
Start: 2022-04-28 | End: 2022-04-28

## 2022-04-28 RX ORDER — METHOCARBAMOL 500 MG/1
500 TABLET, FILM COATED ORAL EVERY 12 HOURS PRN
Status: DISCONTINUED | OUTPATIENT
Start: 2022-04-28 | End: 2022-05-13 | Stop reason: HOSPADM

## 2022-04-28 RX ADMIN — HEPARIN SODIUM 5000 UNITS: 5000 INJECTION INTRAVENOUS; SUBCUTANEOUS at 08:18

## 2022-04-28 RX ADMIN — ATORVASTATIN CALCIUM 40 MG: 40 TABLET, FILM COATED ORAL at 20:57

## 2022-04-28 RX ADMIN — FAMOTIDINE 20 MG: 20 TABLET, FILM COATED ORAL at 08:18

## 2022-04-28 RX ADMIN — METOPROLOL TARTRATE 12.5 MG: 25 TABLET ORAL at 08:18

## 2022-04-28 RX ADMIN — PROCHLORPERAZINE MALEATE 5 MG: 5 TABLET ORAL at 10:41

## 2022-04-28 RX ADMIN — HEPARIN SODIUM 5000 UNITS: 5000 INJECTION INTRAVENOUS; SUBCUTANEOUS at 20:57

## 2022-04-28 RX ADMIN — BUPRENORPHINE HYDROCHLORIDE AND NALOXONE HYDROCHLORIDE DIHYDRATE 2 TABLET: 8; 2 TABLET SUBLINGUAL at 08:18

## 2022-04-28 RX ADMIN — POLYETHYLENE GLYCOL (3350) 17 G: 17 POWDER, FOR SOLUTION ORAL at 23:36

## 2022-04-28 RX ADMIN — Medication 1 TABLET: at 08:18

## 2022-04-28 RX ADMIN — FERROUS SULFATE TAB 325 MG (65 MG ELEMENTAL FE) 325 MG: 325 (65 FE) TAB at 08:18

## 2022-04-28 RX ADMIN — METHOCARBAMOL 500 MG: 500 TABLET, FILM COATED ORAL at 12:51

## 2022-04-28 RX ADMIN — METOPROLOL TARTRATE 12.5 MG: 25 TABLET ORAL at 20:57

## 2022-04-28 RX ADMIN — ASPIRIN 81 MG: 81 TABLET, CHEWABLE ORAL at 08:18

## 2022-04-28 RX ADMIN — FERROUS SULFATE TAB 325 MG (65 MG ELEMENTAL FE) 325 MG: 325 (65 FE) TAB at 16:42

## 2022-04-28 RX ADMIN — SODIUM CHLORIDE 250 ML: 9 INJECTION, SOLUTION INTRAVENOUS at 04:15

## 2022-04-29 PROCEDURE — 25010000002 HEPARIN (PORCINE) PER 1000 UNITS: Performed by: INTERNAL MEDICINE

## 2022-04-29 PROCEDURE — 63710000001 ONDANSETRON PER 8 MG: Performed by: INTERNAL MEDICINE

## 2022-04-29 PROCEDURE — 97530 THERAPEUTIC ACTIVITIES: CPT

## 2022-04-29 PROCEDURE — 97110 THERAPEUTIC EXERCISES: CPT

## 2022-04-29 PROCEDURE — 97535 SELF CARE MNGMENT TRAINING: CPT

## 2022-04-29 PROCEDURE — 97112 NEUROMUSCULAR REEDUCATION: CPT

## 2022-04-29 RX ADMIN — ONDANSETRON HYDROCHLORIDE 4 MG: 4 TABLET, FILM COATED ORAL at 22:40

## 2022-04-29 RX ADMIN — ONDANSETRON HYDROCHLORIDE 4 MG: 4 TABLET, FILM COATED ORAL at 12:06

## 2022-04-29 RX ADMIN — ASPIRIN 81 MG: 81 TABLET, CHEWABLE ORAL at 08:20

## 2022-04-29 RX ADMIN — HEPARIN SODIUM 5000 UNITS: 5000 INJECTION INTRAVENOUS; SUBCUTANEOUS at 08:20

## 2022-04-29 RX ADMIN — METOPROLOL TARTRATE 12.5 MG: 25 TABLET ORAL at 20:59

## 2022-04-29 RX ADMIN — Medication 1 TABLET: at 08:20

## 2022-04-29 RX ADMIN — FERROUS SULFATE TAB 325 MG (65 MG ELEMENTAL FE) 325 MG: 325 (65 FE) TAB at 17:07

## 2022-04-29 RX ADMIN — ATORVASTATIN CALCIUM 40 MG: 40 TABLET, FILM COATED ORAL at 20:59

## 2022-04-29 RX ADMIN — HEPARIN SODIUM 5000 UNITS: 5000 INJECTION INTRAVENOUS; SUBCUTANEOUS at 20:59

## 2022-04-29 RX ADMIN — BUPRENORPHINE HYDROCHLORIDE AND NALOXONE HYDROCHLORIDE DIHYDRATE 2 TABLET: 8; 2 TABLET SUBLINGUAL at 08:29

## 2022-04-29 RX ADMIN — FERROUS SULFATE TAB 325 MG (65 MG ELEMENTAL FE) 325 MG: 325 (65 FE) TAB at 08:20

## 2022-04-29 RX ADMIN — FAMOTIDINE 20 MG: 20 TABLET, FILM COATED ORAL at 08:20

## 2022-04-29 RX ADMIN — METOPROLOL TARTRATE 12.5 MG: 25 TABLET ORAL at 08:20

## 2022-04-29 RX ADMIN — METHOCARBAMOL 500 MG: 500 TABLET, FILM COATED ORAL at 20:59

## 2022-04-30 LAB
ALBUMIN SERPL-MCNC: 1.57 G/DL (ref 3.5–5.2)
ALBUMIN/GLOB SERPL: 0.4 G/DL
ALP SERPL-CCNC: 157 U/L (ref 39–117)
ALT SERPL W P-5'-P-CCNC: 26 U/L (ref 1–41)
ANION GAP SERPL CALCULATED.3IONS-SCNC: 7.7 MMOL/L (ref 5–15)
AST SERPL-CCNC: 31 U/L (ref 1–40)
BASOPHILS # BLD AUTO: 0.02 10*3/MM3 (ref 0–0.2)
BASOPHILS NFR BLD AUTO: 0.5 % (ref 0–1.5)
BILIRUB SERPL-MCNC: 0.2 MG/DL (ref 0–1.2)
BUN SERPL-MCNC: 8 MG/DL (ref 6–20)
BUN/CREAT SERPL: 10.7 (ref 7–25)
CALCIUM SPEC-SCNC: 7.5 MG/DL (ref 8.6–10.5)
CHLORIDE SERPL-SCNC: 105 MMOL/L (ref 98–107)
CO2 SERPL-SCNC: 27.3 MMOL/L (ref 22–29)
CREAT SERPL-MCNC: 0.75 MG/DL (ref 0.76–1.27)
DEPRECATED RDW RBC AUTO: 56.2 FL (ref 37–54)
EGFRCR SERPLBLD CKD-EPI 2021: 107.2 ML/MIN/1.73
EOSINOPHIL # BLD AUTO: 0.05 10*3/MM3 (ref 0–0.4)
EOSINOPHIL NFR BLD AUTO: 1.1 % (ref 0.3–6.2)
ERYTHROCYTE [DISTWIDTH] IN BLOOD BY AUTOMATED COUNT: 17.3 % (ref 12.3–15.4)
GLOBULIN UR ELPH-MCNC: 4.2 GM/DL
GLUCOSE BLDC GLUCOMTR-MCNC: 109 MG/DL (ref 70–130)
GLUCOSE BLDC GLUCOMTR-MCNC: 58 MG/DL (ref 70–130)
GLUCOSE BLDC GLUCOMTR-MCNC: 66 MG/DL (ref 70–130)
GLUCOSE BLDC GLUCOMTR-MCNC: 66 MG/DL (ref 70–130)
GLUCOSE BLDC GLUCOMTR-MCNC: 74 MG/DL (ref 70–130)
GLUCOSE BLDC GLUCOMTR-MCNC: 77 MG/DL (ref 70–130)
GLUCOSE BLDC GLUCOMTR-MCNC: 81 MG/DL (ref 70–130)
GLUCOSE SERPL-MCNC: 64 MG/DL (ref 65–99)
HCT VFR BLD AUTO: 24 % (ref 37.5–51)
HGB BLD-MCNC: 7.3 G/DL (ref 13–17.7)
IMM GRANULOCYTES # BLD AUTO: 0.01 10*3/MM3 (ref 0–0.05)
IMM GRANULOCYTES NFR BLD AUTO: 0.2 % (ref 0–0.5)
LYMPHOCYTES # BLD AUTO: 1.66 10*3/MM3 (ref 0.7–3.1)
LYMPHOCYTES NFR BLD AUTO: 37.6 % (ref 19.6–45.3)
MCH RBC QN AUTO: 27.7 PG (ref 26.6–33)
MCHC RBC AUTO-ENTMCNC: 30.4 G/DL (ref 31.5–35.7)
MCV RBC AUTO: 90.9 FL (ref 79–97)
MONOCYTES # BLD AUTO: 0.41 10*3/MM3 (ref 0.1–0.9)
MONOCYTES NFR BLD AUTO: 9.3 % (ref 5–12)
NEUTROPHILS NFR BLD AUTO: 2.27 10*3/MM3 (ref 1.7–7)
NEUTROPHILS NFR BLD AUTO: 51.3 % (ref 42.7–76)
NRBC BLD AUTO-RTO: 0 /100 WBC (ref 0–0.2)
PLATELET # BLD AUTO: 675 10*3/MM3 (ref 140–450)
PMV BLD AUTO: 9.8 FL (ref 6–12)
POTASSIUM SERPL-SCNC: 3.8 MMOL/L (ref 3.5–5.2)
PROT SERPL-MCNC: 5.8 G/DL (ref 6–8.5)
RBC # BLD AUTO: 2.64 10*6/MM3 (ref 4.14–5.8)
SODIUM SERPL-SCNC: 140 MMOL/L (ref 136–145)
WBC NRBC COR # BLD: 4.42 10*3/MM3 (ref 3.4–10.8)

## 2022-04-30 PROCEDURE — 97110 THERAPEUTIC EXERCISES: CPT

## 2022-04-30 PROCEDURE — 82962 GLUCOSE BLOOD TEST: CPT

## 2022-04-30 PROCEDURE — 99231 SBSQ HOSP IP/OBS SF/LOW 25: CPT | Performed by: FAMILY MEDICINE

## 2022-04-30 PROCEDURE — 97530 THERAPEUTIC ACTIVITIES: CPT

## 2022-04-30 PROCEDURE — 25010000002 HEPARIN (PORCINE) PER 1000 UNITS: Performed by: INTERNAL MEDICINE

## 2022-04-30 PROCEDURE — 63710000001 PROCHLORPERAZINE MALEATE PER 5 MG: Performed by: INTERNAL MEDICINE

## 2022-04-30 PROCEDURE — 80053 COMPREHEN METABOLIC PANEL: CPT | Performed by: INTERNAL MEDICINE

## 2022-04-30 PROCEDURE — 63710000001 ONDANSETRON PER 8 MG: Performed by: INTERNAL MEDICINE

## 2022-04-30 PROCEDURE — 85025 COMPLETE CBC W/AUTO DIFF WBC: CPT | Performed by: INTERNAL MEDICINE

## 2022-04-30 PROCEDURE — 97535 SELF CARE MNGMENT TRAINING: CPT

## 2022-04-30 RX ORDER — METOCLOPRAMIDE 10 MG/1
5 TABLET ORAL
Status: DISCONTINUED | OUTPATIENT
Start: 2022-04-30 | End: 2022-05-01

## 2022-04-30 RX ADMIN — FAMOTIDINE 20 MG: 20 TABLET, FILM COATED ORAL at 09:55

## 2022-04-30 RX ADMIN — METOCLOPRAMIDE 5 MG: 10 TABLET ORAL at 20:25

## 2022-04-30 RX ADMIN — PROCHLORPERAZINE MALEATE 5 MG: 5 TABLET ORAL at 05:53

## 2022-04-30 RX ADMIN — ATORVASTATIN CALCIUM 40 MG: 40 TABLET, FILM COATED ORAL at 20:25

## 2022-04-30 RX ADMIN — HEPARIN SODIUM 5000 UNITS: 5000 INJECTION INTRAVENOUS; SUBCUTANEOUS at 09:56

## 2022-04-30 RX ADMIN — ONDANSETRON HYDROCHLORIDE 4 MG: 4 TABLET, FILM COATED ORAL at 08:18

## 2022-04-30 RX ADMIN — ONDANSETRON HYDROCHLORIDE 4 MG: 4 TABLET, FILM COATED ORAL at 14:23

## 2022-04-30 RX ADMIN — Medication 1 TABLET: at 09:55

## 2022-04-30 RX ADMIN — ONDANSETRON HYDROCHLORIDE 4 MG: 4 TABLET, FILM COATED ORAL at 22:36

## 2022-04-30 RX ADMIN — FERROUS SULFATE TAB 325 MG (65 MG ELEMENTAL FE) 325 MG: 325 (65 FE) TAB at 17:15

## 2022-04-30 RX ADMIN — FERROUS SULFATE TAB 325 MG (65 MG ELEMENTAL FE) 325 MG: 325 (65 FE) TAB at 09:55

## 2022-04-30 RX ADMIN — ASPIRIN 81 MG: 81 TABLET, CHEWABLE ORAL at 09:55

## 2022-04-30 RX ADMIN — METOCLOPRAMIDE 5 MG: 10 TABLET ORAL at 11:45

## 2022-04-30 RX ADMIN — METOCLOPRAMIDE 5 MG: 10 TABLET ORAL at 17:15

## 2022-04-30 RX ADMIN — HEPARIN SODIUM 5000 UNITS: 5000 INJECTION INTRAVENOUS; SUBCUTANEOUS at 20:25

## 2022-04-30 RX ADMIN — METOPROLOL TARTRATE 12.5 MG: 25 TABLET ORAL at 20:25

## 2022-04-30 RX ADMIN — BUPRENORPHINE HYDROCHLORIDE AND NALOXONE HYDROCHLORIDE DIHYDRATE 2 TABLET: 8; 2 TABLET SUBLINGUAL at 10:28

## 2022-04-30 RX ADMIN — METOPROLOL TARTRATE 12.5 MG: 25 TABLET ORAL at 09:55

## 2022-05-01 LAB
ANION GAP SERPL CALCULATED.3IONS-SCNC: 6 MMOL/L (ref 5–15)
BASOPHILS # BLD AUTO: 0.01 10*3/MM3 (ref 0–0.2)
BASOPHILS NFR BLD AUTO: 0.2 % (ref 0–1.5)
BUN SERPL-MCNC: 9 MG/DL (ref 6–20)
BUN/CREAT SERPL: 11.7 (ref 7–25)
CALCIUM SPEC-SCNC: 7.8 MG/DL (ref 8.6–10.5)
CHLORIDE SERPL-SCNC: 103 MMOL/L (ref 98–107)
CO2 SERPL-SCNC: 29 MMOL/L (ref 22–29)
CREAT SERPL-MCNC: 0.77 MG/DL (ref 0.76–1.27)
DEPRECATED RDW RBC AUTO: 55.5 FL (ref 37–54)
EGFRCR SERPLBLD CKD-EPI 2021: 106.4 ML/MIN/1.73
EOSINOPHIL # BLD AUTO: 0.04 10*3/MM3 (ref 0–0.4)
EOSINOPHIL NFR BLD AUTO: 0.9 % (ref 0.3–6.2)
ERYTHROCYTE [DISTWIDTH] IN BLOOD BY AUTOMATED COUNT: 17.2 % (ref 12.3–15.4)
GLUCOSE BLDC GLUCOMTR-MCNC: 73 MG/DL (ref 70–130)
GLUCOSE BLDC GLUCOMTR-MCNC: 84 MG/DL (ref 70–130)
GLUCOSE BLDC GLUCOMTR-MCNC: 90 MG/DL (ref 70–130)
GLUCOSE BLDC GLUCOMTR-MCNC: 92 MG/DL (ref 70–130)
GLUCOSE BLDC GLUCOMTR-MCNC: 99 MG/DL (ref 70–130)
GLUCOSE SERPL-MCNC: 83 MG/DL (ref 65–99)
HCT VFR BLD AUTO: 24.9 % (ref 37.5–51)
HGB BLD-MCNC: 7.7 G/DL (ref 13–17.7)
IMM GRANULOCYTES # BLD AUTO: 0.01 10*3/MM3 (ref 0–0.05)
IMM GRANULOCYTES NFR BLD AUTO: 0.2 % (ref 0–0.5)
LYMPHOCYTES # BLD AUTO: 1.84 10*3/MM3 (ref 0.7–3.1)
LYMPHOCYTES NFR BLD AUTO: 41.9 % (ref 19.6–45.3)
MCH RBC QN AUTO: 28.2 PG (ref 26.6–33)
MCHC RBC AUTO-ENTMCNC: 30.9 G/DL (ref 31.5–35.7)
MCV RBC AUTO: 91.2 FL (ref 79–97)
MONOCYTES # BLD AUTO: 0.41 10*3/MM3 (ref 0.1–0.9)
MONOCYTES NFR BLD AUTO: 9.3 % (ref 5–12)
NEUTROPHILS NFR BLD AUTO: 2.08 10*3/MM3 (ref 1.7–7)
NEUTROPHILS NFR BLD AUTO: 47.5 % (ref 42.7–76)
NRBC BLD AUTO-RTO: 0 /100 WBC (ref 0–0.2)
PLATELET # BLD AUTO: 725 10*3/MM3 (ref 140–450)
PMV BLD AUTO: 9.8 FL (ref 6–12)
POTASSIUM SERPL-SCNC: 3.8 MMOL/L (ref 3.5–5.2)
RBC # BLD AUTO: 2.73 10*6/MM3 (ref 4.14–5.8)
SODIUM SERPL-SCNC: 138 MMOL/L (ref 136–145)
WBC NRBC COR # BLD: 4.39 10*3/MM3 (ref 3.4–10.8)

## 2022-05-01 PROCEDURE — 82962 GLUCOSE BLOOD TEST: CPT

## 2022-05-01 PROCEDURE — 85025 COMPLETE CBC W/AUTO DIFF WBC: CPT | Performed by: FAMILY MEDICINE

## 2022-05-01 PROCEDURE — 25010000002 HEPARIN (PORCINE) PER 1000 UNITS: Performed by: INTERNAL MEDICINE

## 2022-05-01 PROCEDURE — 80048 BASIC METABOLIC PNL TOTAL CA: CPT | Performed by: FAMILY MEDICINE

## 2022-05-01 PROCEDURE — 63710000001 ONDANSETRON PER 8 MG: Performed by: INTERNAL MEDICINE

## 2022-05-01 PROCEDURE — 99231 SBSQ HOSP IP/OBS SF/LOW 25: CPT | Performed by: FAMILY MEDICINE

## 2022-05-01 RX ORDER — METOCLOPRAMIDE 10 MG/1
10 TABLET ORAL
Status: DISCONTINUED | OUTPATIENT
Start: 2022-05-01 | End: 2022-05-02

## 2022-05-01 RX ADMIN — METOCLOPRAMIDE 10 MG: 10 TABLET ORAL at 16:52

## 2022-05-01 RX ADMIN — ATORVASTATIN CALCIUM 40 MG: 40 TABLET, FILM COATED ORAL at 20:50

## 2022-05-01 RX ADMIN — ONDANSETRON HYDROCHLORIDE 4 MG: 4 TABLET, FILM COATED ORAL at 22:56

## 2022-05-01 RX ADMIN — METOCLOPRAMIDE 10 MG: 10 TABLET ORAL at 11:58

## 2022-05-01 RX ADMIN — METOPROLOL TARTRATE 12.5 MG: 25 TABLET ORAL at 08:37

## 2022-05-01 RX ADMIN — METOPROLOL TARTRATE 12.5 MG: 25 TABLET ORAL at 20:50

## 2022-05-01 RX ADMIN — METOCLOPRAMIDE 10 MG: 10 TABLET ORAL at 20:50

## 2022-05-01 RX ADMIN — HEPARIN SODIUM 5000 UNITS: 5000 INJECTION INTRAVENOUS; SUBCUTANEOUS at 20:50

## 2022-05-01 RX ADMIN — ONDANSETRON HYDROCHLORIDE 4 MG: 4 TABLET, FILM COATED ORAL at 08:37

## 2022-05-01 RX ADMIN — FERROUS SULFATE TAB 325 MG (65 MG ELEMENTAL FE) 325 MG: 325 (65 FE) TAB at 08:38

## 2022-05-01 RX ADMIN — HEPARIN SODIUM 5000 UNITS: 5000 INJECTION INTRAVENOUS; SUBCUTANEOUS at 08:40

## 2022-05-01 RX ADMIN — FAMOTIDINE 20 MG: 20 TABLET, FILM COATED ORAL at 08:38

## 2022-05-01 RX ADMIN — ONDANSETRON HYDROCHLORIDE 4 MG: 4 TABLET, FILM COATED ORAL at 16:52

## 2022-05-01 RX ADMIN — Medication 1 TABLET: at 08:38

## 2022-05-01 RX ADMIN — ASPIRIN 81 MG: 81 TABLET, CHEWABLE ORAL at 08:37

## 2022-05-01 RX ADMIN — FERROUS SULFATE TAB 325 MG (65 MG ELEMENTAL FE) 325 MG: 325 (65 FE) TAB at 17:40

## 2022-05-01 RX ADMIN — BUPRENORPHINE HYDROCHLORIDE AND NALOXONE HYDROCHLORIDE DIHYDRATE 2 TABLET: 8; 2 TABLET SUBLINGUAL at 08:36

## 2022-05-01 RX ADMIN — METOCLOPRAMIDE 5 MG: 10 TABLET ORAL at 06:35

## 2022-05-01 NOTE — PROGRESS NOTES
Rehabilitation Nursing  Inpatient Rehabilitation Plan of Care Note    Plan of Care  Copy from Peggy    Pain Management (Active)  Current Status (4/25/2022 3:25:00 PM): Potential for increased pain  Weekly Goal: Tolerable pain level  Discharge Goal: No pain    Body Function Structure    Skin Integrity (Active)  Current Status (4/25/2022 3:25:00 PM): Impaired skin integrity  Weekly Goal: No more skin breakdown/improvement of skin integrity  Discharge Goal: Improved skin integrity    Safety    Potential for Injury (Active)  Current Status (4/25/2022 3:25:00 PM): Potential for falls  Weekly Goal: No falls  Discharge Goal: No falls    Signed by: Duglas Prince RN

## 2022-05-01 NOTE — PROGRESS NOTES
[facility]  PROGRESS NOTE     Patient Identification:  Name:  Syed Grajeda  Age:  56 y.o.  Sex:  male  :  1965  MRN:  [id number]  Visit Number:  [id number]  ROOM: [facility]     Primary Care Provider:  Zheng Serrato MD    Length of stay in inpatient status:  6    Subjective     Chief Compliant:  No chief complaint on file.      History of Presenting Illness: 56-year-old gentleman with history of right AKA, left BKA, diabetes mellitus, ASCVD, sacral decubitus wound, opioid dependency, chronic pain disorder, neuropathy.  Patient complaint continues complain of some nausea.  Has no new complaints otherwise    Objective     Current Hospital Meds:aspirin, 81 mg, Oral, Daily  atorvastatin, 40 mg, Oral, Nightly  buprenorphine-naloxone, 2 tablet, Sublingual, Daily  famotidine, 20 mg, Oral, Daily  ferrous sulfate, 325 mg, Oral, BID With Meals  heparin (porcine), 5,000 Units, Subcutaneous, Q12H  metoclopramide, 10 mg, Oral, 4x Daily AC & at Bedtime  metoprolol tartrate, 12.5 mg, Oral, Q12H  multivitamin with minerals, 1 tablet, Oral, Daily       ----------------------------------------------------------------------------------------------------------------------  Vital Signs:  Temp:  [97.6 °F (36.4 °C)-98 °F (36.7 °C)] 97.6 °F (36.4 °C)  Heart Rate:  [80-88] 80  Resp:  [18] 18  BP: (118-122)/(63-72) 122/63  SpO2:  [96 %-97 %] 97 %  on   ;   Device (Oxygen Therapy): room air  Body mass index is 46.39 kg/m².    Wt Readings from Last 3 Encounters:   22 74.8 kg (164 lb 15.5 oz)   19 81.6 kg (180 lb)     Intake & Output (last 3 days)        07    P.O. 7069 147 6498 240    I.V. (mL/kg)        Blood        Total Intake(mL/kg) 1080 (14.4) 720 (9.6) 1200 (16) 240 (3.2)    Urine (mL/kg/hr) 1450 (0.8) 425 (0.2) 500 (0.3) 300 (1.1)    Stool 0 0      Total Output 1450 425 500 300    Net -370 +295 +700 -60             Urine Unmeasured Occurrence 1 x 1 x 2 x     Stool Unmeasured Occurrence 4 x 3 x 1 x         Diet Soft Texture; Ground; Thin; Cardiac, Consistent Carbohydrate  ----------------------------------------------------------------------------------------------------------------------  Physical exam:  Constitutional:   Frail patient who appears older than stated age in no distress  HEENT: Normocephalic atraumatic  Neck: Supple   Cardiovascular: Regular rate and rhythm  Pulmonary/Chest: Clear to auscultation  Abdominal: Positive bowel sounds soft.   Musculoskeletal: No arthropathy  Neurological: No focal deficits  Skin: No rash  Peripheral vascular:  Genitourinary:  ----------------------------------------------------------------------------------------------------------------------    Last echocardiogram:    ----------------------------------------------------------------------------------------------------------------------  Results from last 7 days   Lab Units 05/01/22  0105 04/30/22  0049 04/28/22  1123 04/28/22 0042   WBC 10*3/mm3 4.39 4.42  --  4.66   HEMOGLOBIN g/dL 7.7* 7.3* 7.7* 6.1*   HEMATOCRIT % 24.9* 24.0* 25.4* 19.7*   MCV fL 91.2 90.9  --  92.5   MCHC g/dL 30.9* 30.4*  --  31.0*   PLATELETS 10*3/mm3 725* 675*  --  496*         Results from last 7 days   Lab Units 05/01/22  0105 04/30/22  0049 04/28/22  0042 04/26/22  0008   SODIUM mmol/L 138 140 140 139   POTASSIUM mmol/L 3.8 3.8 3.8 4.2   MAGNESIUM mg/dL  --   --   --  1.9   CHLORIDE mmol/L 103 105 106 104   CO2 mmol/L 29.0 27.3 29.1* 28.6   BUN mg/dL 9 8 7 5*   CREATININE mg/dL 0.77 0.75* 0.84 0.83   CALCIUM mg/dL 7.8* 7.5* 7.3* 7.5*   GLUCOSE mg/dL 83 64* 84 101*   ALBUMIN g/dL  --  1.57* 1.59* 1.51*   BILIRUBIN mg/dL  --  0.2 0.2 0.2   ALK PHOS U/L  --  157* 151* 159*   AST (SGOT) U/L  --  31 36 55*   ALT (SGPT) U/L  --  26 32 39   Estimated Creatinine Clearance: 81 mL/min (by C-G formula based on SCr of 0.77 mg/dL).  No results found for:  AMMONIA              Glucose   Date/Time Value Ref Range Status   05/01/2022 0606 84 70 - 130 mg/dL Final     Comment:     Meter: [id number] : [id number] Deuce Crystal   04/30/2022 2018 109 70 - 130 mg/dL Final     Comment:     Meter: [id number] : [id number] Deuce Crystal   04/30/2022 1739 77 70 - 130 mg/dL Final     Comment:     Meter: [id number] : [id number] linda nyla   04/30/2022 1603 66 (L) 70 - 130 mg/dL Final     Comment:     Meter: [id number] : [id number] linda nyla   04/30/2022 1247 81 70 - 130 mg/dL Final     Comment:     Meter: [id number] : [id number] linda nyla   04/30/2022 1107 66 (L) 70 - 130 mg/dL Final     Comment:     Meter: [id number] : [id number] Mindy Yuen   04/30/2022 0902 74 70 - 130 mg/dL Final     Comment:     Meter: [id number] : [id number] Mindy Alpa   04/30/2022 0813 58 (L) 70 - 130 mg/dL Final     Comment:     Meter: [id number] : [id number] Mindy Yuen     No results found for: TSH, FREET4  No results found for: PREGTESTUR, PREGSERUM, HCG, HCGQUANT  Pain Management Panel    There is no flowsheet data to display.       Brief Urine Lab Results     None        No results found for: BLOODCX      No results found for: URINECX  No results found for: WOUNDCX  No results found for: STOOLCX        I have personally looked at the labs and they are summarized above.  ----------------------------------------------------------------------------------------------------------------------  Detailed radiology reports for the last 24 hours:    Imaging Results (Last 24 Hours)     ** No results found for the last 24 hours. **        Final impressions for the last 30 days of radiology reports:    CT Abdomen Pelvis Without Contrast    Result Date: 4/28/2022   1. Consolidation in the right lung and small bilateral effusions, right greater than left.  2.Thickening of the urinary bladder wall.   3. Mild degree of anasarca.  4. Moderate to large volume  stool.     This report was finalized on 4/28/2022 4:59 PM by Dr. Km Adams MD.      US Liver    Result Date: 4/27/2022  Homogeneous echotexture of the liver.  This report was finalized on 4/27/2022 4:22 PM by Dr. Km Adams MD.      I have personally looked at the radiology images and read the final radiology report.    Assessment & Plan    Status post right AKA with remote history of left BKA--patient requiring maximum assistance for upper lower body dressing; set up for grooming; set up for self-feeding; minimum to moderate assistance for up with bed mobility; moderate to maximum assist for transfers; did work on motor skills yesterday.    Nausea--increase Reglan to 10 mg 4 times daily today and will follow    Anemia stable--hemoglobin is 7.7 this morning.    Thrombocytosis--may be secondary to anemia versus recent AKA.  Will monitor    Diabetes mellitus stable.  Currently not requiring medication    Sacral decubitus wound continue local care    Hypertension controlled    VTE Prophylaxis:   Mechanical Order History:     None      Pharmalogical Order History:      Ordered     Dose Route Frequency Stop    04/25/22 1529  heparin (porcine) 5000 UNIT/ML injection 5,000 Units         5,000 Units SC Every 12 Hours Scheduled --                    Manohar Piedra MD  [facility]  05/01/22  10:46 EDT

## 2022-05-01 NOTE — PLAN OF CARE
Goal Outcome Evaluation:  Plan of Care Reviewed With: patient        Progress: improving  Outcome Evaluation: Pt progressing with current POC.

## 2022-05-02 LAB
GLUCOSE BLDC GLUCOMTR-MCNC: 121 MG/DL (ref 70–130)
GLUCOSE BLDC GLUCOMTR-MCNC: 75 MG/DL (ref 70–130)
GLUCOSE BLDC GLUCOMTR-MCNC: 78 MG/DL (ref 70–130)
GLUCOSE BLDC GLUCOMTR-MCNC: 96 MG/DL (ref 70–130)

## 2022-05-02 PROCEDURE — 99231 SBSQ HOSP IP/OBS SF/LOW 25: CPT | Performed by: FAMILY MEDICINE

## 2022-05-02 PROCEDURE — 97110 THERAPEUTIC EXERCISES: CPT

## 2022-05-02 PROCEDURE — 97530 THERAPEUTIC ACTIVITIES: CPT

## 2022-05-02 PROCEDURE — 63710000001 ONDANSETRON PER 8 MG: Performed by: INTERNAL MEDICINE

## 2022-05-02 PROCEDURE — 82962 GLUCOSE BLOOD TEST: CPT

## 2022-05-02 PROCEDURE — 97535 SELF CARE MNGMENT TRAINING: CPT

## 2022-05-02 PROCEDURE — 25010000002 HEPARIN (PORCINE) PER 1000 UNITS: Performed by: INTERNAL MEDICINE

## 2022-05-02 RX ORDER — HYDROXYZINE HYDROCHLORIDE 25 MG/1
25 TABLET, FILM COATED ORAL 3 TIMES DAILY PRN
Status: DISCONTINUED | OUTPATIENT
Start: 2022-05-02 | End: 2022-05-13 | Stop reason: HOSPADM

## 2022-05-02 RX ADMIN — HEPARIN SODIUM 5000 UNITS: 5000 INJECTION INTRAVENOUS; SUBCUTANEOUS at 09:24

## 2022-05-02 RX ADMIN — METOPROLOL TARTRATE 12.5 MG: 25 TABLET ORAL at 20:56

## 2022-05-02 RX ADMIN — HYDROXYZINE HYDROCHLORIDE 25 MG: 25 TABLET ORAL at 11:42

## 2022-05-02 RX ADMIN — FERROUS SULFATE TAB 325 MG (65 MG ELEMENTAL FE) 325 MG: 325 (65 FE) TAB at 18:18

## 2022-05-02 RX ADMIN — ONDANSETRON HYDROCHLORIDE 4 MG: 4 TABLET, FILM COATED ORAL at 08:52

## 2022-05-02 RX ADMIN — Medication 1 TABLET: at 09:22

## 2022-05-02 RX ADMIN — METHOCARBAMOL 500 MG: 500 TABLET, FILM COATED ORAL at 05:05

## 2022-05-02 RX ADMIN — METOPROLOL TARTRATE 12.5 MG: 25 TABLET ORAL at 09:24

## 2022-05-02 RX ADMIN — ASPIRIN 81 MG: 81 TABLET, CHEWABLE ORAL at 09:22

## 2022-05-02 RX ADMIN — ATORVASTATIN CALCIUM 40 MG: 40 TABLET, FILM COATED ORAL at 20:56

## 2022-05-02 RX ADMIN — ACETAMINOPHEN 650 MG: 325 TABLET ORAL at 05:05

## 2022-05-02 RX ADMIN — BUPRENORPHINE HYDROCHLORIDE AND NALOXONE HYDROCHLORIDE DIHYDRATE 2 TABLET: 8; 2 TABLET SUBLINGUAL at 09:25

## 2022-05-02 RX ADMIN — FERROUS SULFATE TAB 325 MG (65 MG ELEMENTAL FE) 325 MG: 325 (65 FE) TAB at 09:23

## 2022-05-02 RX ADMIN — HEPARIN SODIUM 5000 UNITS: 5000 INJECTION INTRAVENOUS; SUBCUTANEOUS at 20:56

## 2022-05-02 RX ADMIN — METOCLOPRAMIDE 10 MG: 10 TABLET ORAL at 06:35

## 2022-05-02 RX ADMIN — FAMOTIDINE 20 MG: 20 TABLET, FILM COATED ORAL at 09:23

## 2022-05-02 NOTE — PROGRESS NOTES
Inpatient Rehabilitation Functional Measures Assessment and Plan of Care    Plan of Care  Self Care    [OT] Dressing (Upper)(Resolved)  Current Status(05/02/2022): setup  Weekly Goal(05/03/2022): Amy  Discharge Goal: setup    [OT] Dressing (Lower)(Active)  Current Status(05/02/2022): maxA  Weekly Goal(05/10/2022): modA  Discharge Goal: Amy    Performed Intervention(s)  ADL retraining/AE education, fxal mobility, BUE ther ex/act, GMC/FMC    Functional Measures  SAMMY Eating:  SAMMY Grooming:  SAMMY Bathing:  SAMMY Upper Body Dressing:  SAMMY Lower Body Dressing:  SAMMY Toileting:    SAMMY Bladder Management  Level of Assistance:  Frequency/Number of Accidents this Shift:    SAMMY Bowel Management  Level of Assistance:  Frequency/Number of Accidents this Shift:    SAMMY Bed/Chair/Wheelchair Transfer:  SAMMY Toilet Transfer:  SAMMY Tub/Shower Transfer:    Previously Documented Mode of Locomotion at Discharge:  SAMMY Expected Mode of Locomotion at Discharge:  SAMMY Walk/Wheelchair:  SAMMY Stairs:    SAMMY Comprehension:  SAMMY Expression:  SAMMY Social Interaction:  SAMMY Problem Solving:  SAMMY Memory:    Therapy Mode Minutes  Occupational Therapy: Individual: 90 minutes.  Physical Therapy:  Speech Language Pathology:    Discharge Functional Goals:    Signed by: Jenny French Occupational Therapist

## 2022-05-02 NOTE — PLAN OF CARE
Problem: Rehabilitation (IRF) Plan of Care  Goal: Plan of Care Review  Outcome: Ongoing, Progressing  Flowsheets (Taken 5/2/2022 0616)  Progress: no change  Plan of Care Reviewed With: patient  Outcome Evaluation:   pt calm and cooperative   respirations unlabored   no acute distress noted   pt up most of the night watching TV   noted dozing several times   no complaints voiced   dressing changes completed this AM   pt toleraed well  Goal: Patient-Specific Goal (Individualized)  Outcome: Ongoing, Progressing  Goal: Absence of New-Onset Illness or Injury  Outcome: Ongoing, Progressing  Intervention: Prevent Fall and Fall Injury  Recent Flowsheet Documentation  Taken 5/2/2022 0615 by Tamiko Perez, RN  Safety Promotion/Fall Prevention:   nonskid shoes/slippers when out of bed   clutter free environment maintained   safety round/check completed  Taken 5/2/2022 0200 by Tamiko Perez, RN  Safety Promotion/Fall Prevention:   nonskid shoes/slippers when out of bed   assistive device/personal items within reach   safety round/check completed  Taken 5/2/2022 0000 by Tamiko Perez RN  Safety Promotion/Fall Prevention:   safety round/check completed   clutter free environment maintained   fall prevention program maintained   mobility aid in reach   assistive device/personal items within reach  Taken 5/1/2022 2200 by Tamiko Perez, RN  Safety Promotion/Fall Prevention:   safety round/check completed   clutter free environment maintained   fall prevention program maintained  Taken 5/1/2022 2000 by Tamiko Perez, RN  Safety Promotion/Fall Prevention:   safety round/check completed   clutter free environment maintained   fall prevention program maintained   room organization consistent  Goal: Optimal Comfort and Wellbeing  Outcome: Ongoing, Progressing  Goal: Home and Community Transition Plan Established  Outcome: Ongoing, Progressing   Goal Outcome Evaluation:  Plan of  Care Reviewed With: patient        Progress: no change  Outcome Evaluation: pt calm and cooperative; respirations unlabored; no acute distress noted; pt up most of the night watching TV; noted dozing several times; no complaints voiced; dressing changes completed this AM; pt toleraed well

## 2022-05-02 NOTE — PROGRESS NOTES
[facility]  PROGRESS NOTE     Patient Identification:  Name:  Syed Grajeda  Age:  57 y.o.  Sex:  male  :  1965  MRN:  [id number]  Visit Number:  [id number]  ROOM: [facility]     Primary Care Provider:  Zheng Serrato MD    Length of stay in inpatient status:  7    Subjective     Chief Compliant:  No chief complaint on file.      History of Presenting Illness: 56-year-old gentleman with a history of right AKA, prior left BKA, diabetes mellitus, and ASCVD, sacral decubitus wound, opiate dependency, chronic pain disorder, neuropathy.  Patient still having some nausea and which he attributes to his anxiety.  Patient states that he did better with this when he was on Vistaril at home.    Objective     Current Hospital Meds:aspirin, 81 mg, Oral, Daily  atorvastatin, 40 mg, Oral, Nightly  buprenorphine-naloxone, 2 tablet, Sublingual, Daily  famotidine, 20 mg, Oral, Daily  ferrous sulfate, 325 mg, Oral, BID With Meals  heparin (porcine), 5,000 Units, Subcutaneous, Q12H  metoprolol tartrate, 12.5 mg, Oral, Q12H  multivitamin with minerals, 1 tablet, Oral, Daily       ----------------------------------------------------------------------------------------------------------------------  Vital Signs:  Temp:  [97.7 °F (36.5 °C)] 97.7 °F (36.5 °C)  Heart Rate:  [81] 81  Resp:  [20] 20  BP: (130)/(62) 130/62  SpO2:  [97 %] 97 %  on   ;   Device (Oxygen Therapy): room air  Body mass index is 46.39 kg/m².    Wt Readings from Last 3 Encounters:   22 74.8 kg (164 lb 15.5 oz)   19 81.6 kg (180 lb)     Intake & Output (last 3 days)        07 07 07 07 0700    P.O. 720 1200 1320     Total Intake(mL/kg) 720 (9.6) 1200 (16) 1320 (17.6)     Urine (mL/kg/hr) 425 (0.2) 500 (0.3) 900 (0.5)     Stool 0       Total Output 425 500 900     Net +295 +700 +420             Urine Unmeasured Occurrence 1 x 2 x 2 x     Stool Unmeasured  Occurrence 3 x 1 x          Diet Soft Texture; Ground; Thin; Cardiac, Consistent Carbohydrate  ----------------------------------------------------------------------------------------------------------------------  Physical exam:  Constitutional:   No acute distress  HEENT: Normocephalic atraumatic  Neck: Supple   Cardiovascular: Regular rate and rhythm  Pulmonary/Chest: Clear to auscultation  Abdominal: Positive bowel sounds soft.   Musculoskeletal: Right AKA (wound is dressed); left BKA.  Neurological: Sensory changes stocking glove distribution  Skin: No rash  Peripheral vascular:  Genitourinary:  ----------------------------------------------------------------------------------------------------------------------    Last echocardiogram:    ----------------------------------------------------------------------------------------------------------------------  Results from last 7 days   Lab Units 05/01/22  0105 04/30/22  0049 04/28/22  1123 04/28/22 0042   WBC 10*3/mm3 4.39 4.42  --  4.66   HEMOGLOBIN g/dL 7.7* 7.3* 7.7* 6.1*   HEMATOCRIT % 24.9* 24.0* 25.4* 19.7*   MCV fL 91.2 90.9  --  92.5   MCHC g/dL 30.9* 30.4*  --  31.0*   PLATELETS 10*3/mm3 725* 675*  --  496*         Results from last 7 days   Lab Units 05/01/22  0105 04/30/22  0049 04/28/22  0042 04/26/22  0008   SODIUM mmol/L 138 140 140 139   POTASSIUM mmol/L 3.8 3.8 3.8 4.2   MAGNESIUM mg/dL  --   --   --  1.9   CHLORIDE mmol/L 103 105 106 104   CO2 mmol/L 29.0 27.3 29.1* 28.6   BUN mg/dL 9 8 7 5*   CREATININE mg/dL 0.77 0.75* 0.84 0.83   CALCIUM mg/dL 7.8* 7.5* 7.3* 7.5*   GLUCOSE mg/dL 83 64* 84 101*   ALBUMIN g/dL  --  1.57* 1.59* 1.51*   BILIRUBIN mg/dL  --  0.2 0.2 0.2   ALK PHOS U/L  --  157* 151* 159*   AST (SGOT) U/L  --  31 36 55*   ALT (SGPT) U/L  --  26 32 39   Estimated Creatinine Clearance: 80 mL/min (by C-G formula based on SCr of 0.77 mg/dL).  No results found for: AMMONIA              Glucose   Date/Time Value Ref Range Status    05/02/2022 0609 75 70 - 130 mg/dL Final     Comment:     Meter: [id number] : [id number] Luis Horner   05/01/2022 2054 99 70 - 130 mg/dL Final     Comment:     Meter: [id number] : [id number] Ana BASHIR   05/01/2022 1651 92 70 - 130 mg/dL Final     Comment:     Meter: [id number] : [id number] [place]   05/01/2022 1617 73 70 - 130 mg/dL Final     Comment:     Meter: [id number] : [id number] linda nyla   05/01/2022 1114 90 70 - 130 mg/dL Final     Comment:     Meter: [id number] : [id number] linda ynla   05/01/2022 0606 84 70 - 130 mg/dL Final     Comment:     Meter: [id number] : [id number] Deuce Crystal   04/30/2022 2018 109 70 - 130 mg/dL Final     Comment:     Meter: [id number] : [id number] Deuce Crystal   04/30/2022 1739 77 70 - 130 mg/dL Final     Comment:     Meter: [id number] : [id number] linda nyla     No results found for: TSH, FREET4  No results found for: PREGTESTUR, PREGSERUM, HCG, HCGQUANT  Pain Management Panel    There is no flowsheet data to display.       Brief Urine Lab Results     None        No results found for: BLOODCX      No results found for: URINECX  No results found for: WOUNDCX  No results found for: STOOLCX        I have personally looked at the labs and they are summarized above.  ----------------------------------------------------------------------------------------------------------------------  Detailed radiology reports for the last 24 hours:    Imaging Results (Last 24 Hours)     ** No results found for the last 24 hours. **        Final impressions for the last 30 days of radiology reports:    CT Abdomen Pelvis Without Contrast    Result Date: 4/28/2022   1. Consolidation in the right lung and small bilateral effusions, right greater than left.  2.Thickening of the urinary bladder wall.   3. Mild degree of anasarca.  4. Moderate to large volume stool.     This report was finalized on 4/28/2022 4:59 PM by Dr. Barbour  MD Bryan.      US Liver    Result Date: 4/27/2022  Homogeneous echotexture of the liver.  This report was finalized on 4/27/2022 4:22 PM by Dr. Km Adams MD.      I have personally looked at the radiology images and read the final radiology report.    Assessment & Plan    Status post right AKA with remote history of left BKA--patient is been requiring maximum assistance for upper and lower body dressing; set up for grooming; set up for self-feeding; minimum to moderate assistance for bed mobility; moderate to maximum assistance for transfers.    Nausea--patient attributes to anxiety.  We will hold Reglan at this point and place patient on Vistaril and reevaluate.  If continues to have difficulties may benefit from upper GI study    Anemia has been stable.  Will monitor closely    Thrombocytosis we will repeat CBC in the a.m.    Diabetes mellitus stable    Sacral decubitus wound local care    Hypertension well controlled    VTE Prophylaxis:   Mechanical Order History:     None      Pharmalogical Order History:      Ordered     Dose Route Frequency Stop    04/25/22 1529  heparin (porcine) 5000 UNIT/ML injection 5,000 Units         5,000 Units SC Every 12 Hours Scheduled --                Manohar Piedra MD  [facility]  05/02/22  08:49 EDT

## 2022-05-02 NOTE — PROGRESS NOTES
Rehabilitation Nursing  Inpatient Rehabilitation Plan of Care Note    Plan of Care  Copy from Peggy    Pain Management (Active)  Current Status (4/25/2022 3:25:00 PM): Potential for increased pain  Weekly Goal: Tolerable pain level  Discharge Goal: No pain    Body Function Structure    Skin Integrity (Active)  Current Status (4/25/2022 3:25:00 PM): Impaired skin integrity  Weekly Goal: No more skin breakdown/improvement of skin integrity  Discharge Goal: Improved skin integrity    Safety    Potential for Injury (Active)  Current Status (4/25/2022 3:25:00 PM): Potential for falls  Weekly Goal: No falls  Discharge Goal: No falls    Signed by: Odette Tovar, Nurse

## 2022-05-02 NOTE — THERAPY TREATMENT NOTE
Inpatient Rehabilitation - Physical Therapy Treatment Note        Jake     Patient Name: Syed Grajeda  : 1965  MRN: [id number]    Today's Date: 2022                    Admit Date: 2022      Visit Dx:     ICD-10-CM ICD-9-CM   1. S/P AKA (above knee amputation) unilateral, right (HCC)  Z89.611 V49.76       Patient Active Problem List   Diagnosis   • Cataract, nuclear sclerotic, right eye   • S/P AKA (above knee amputation) unilateral, right (HCC)       Past Medical History:   Diagnosis Date   • Arthritis    • Diabetes mellitus (CMS/HCC)    • GERD (gastroesophageal reflux disease)    • Hiatal hernia    • IBS (irritable bowel syndrome)        Past Surgical History:   Procedure Laterality Date   • APPENDECTOMY     • BELOW KNEE AMPUTATION Left    • CATARACT EXTRACTION W/ INTRAOCULAR LENS IMPLANT Right 2019    Procedure: CATARACT PHACO EXTRACTION WITH INTRAOCULAR LENS IMPLANT;  Surgeon: Tan Gonzalez MD;  Location: [facility];  Service: Ophthalmology   • TOE AMPUTATION Right     right small toe       PT ASSESSMENT (last 12 hours)     IRF PT Evaluation and Treatment     Row Name 22 1425          PT Time and Intention    Document Type daily treatment  -RF     Mode of Treatment individual therapy;physical therapy  -RF     Patient/Family/Caregiver Comments/Observations Pt c/o nausea, however, no emesis noted. Pt reports low back pain, and posterior RLE pain with TE this date.  -RF     Row Name 22 1425          General Information    Patient Profile Reviewed yes  -RF     Existing Precautions/Restrictions fall  <skin integrity, BLE amputations  -RF     Row Name 22 1425          Pain Scale: FACES Pre/Post-Treatment    Pain: FACES Scale, Pretreatment 4-->hurts little more  -RF     Posttreatment Pain Rating 6-->hurts even more  -RF     Row Name 22 1425          Cognition/Psychosocial    Affect/Mental Status (Cognition) WFL  -RF     Follows Commands (Cognition) verbal  cues/prompting required  -     Row Name 05/02/22 1425          Bed Mobility    Supine-Sit [place] (Bed Mobility) minimum assist (75% patient effort)  -RF     Sit-Supine [place] (Bed Mobility) minimum assist (75% patient effort)  -     Row Name 05/02/22 1425          Transfers    Bed-Chair [place] (Transfers) moderate assist (50% patient effort);minimum assist (75% patient effort)  -RF     Chair-Bed [place] (Transfers) moderate assist (50% patient effort);minimum assist (75% patient effort)  -RF     Assistive Device (Bed-Chair Transfers) transfer board  -     Row Name 05/02/22 1425          Chair-Bed Transfer    Assistive Device (Chair-Bed Transfers) transfer board  -     Row Name 05/02/22 1425          Safety Issues, Functional Mobility    Impairments Affecting Function (Mobility) balance;endurance/activity tolerance;pain;postural/trunk control;range of motion (ROM);strength  -     Row Name 05/02/22 1425          Hip (Therapeutic Exercise)    Hip (Therapeutic Exercise) PROM (passive range of motion)  -     Hip PROM (Therapeutic Exercise) bilateral;extension;supine;10 second hold;10 repetitions  Pt performed supine lying for approx 20 mins while performing supine TE; pt cued to maintain B hip extension/ neutral position while performing TE. B hip flexion contractures noted.  -RF     Hip Strengthening (Therapeutic Exercise) bilateral;flexion;extension;aBduction;aDduction;external rotation;internal rotation;marching while seated;sitting;supine;resistance band;green;2 sets;10 repetitions  -     Row Name 05/02/22 1425          Knee (Therapeutic Exercise)    Knee PROM (Therapeutic Exercise) left;extension;sitting;supine;10 second hold;10 repetitions;other (see comments)  L knee flexion contracture noted.  -RF     Knee Strengthening (Therapeutic Exercise) left;flexion;extension;heel slides;SLR (straight leg raise);supine;2 sets;10 repetitions  -     Row Name 05/02/22 1425           Positioning and Restraints    Pre-Treatment Position in bed  AM; W/C in PM  -RF     In Wheelchair sitting;call light within reach;encouraged to call for assist  BID  -RF     Row Name 05/02/22 1425          Therapy Assessment/Plan (PT)    Patient's Goals For Discharge return home  -RF     Row Name 05/02/22 1425          Therapy Assessment/Plan (PT)    Rehab Potential/Prognosis (PT) adequate, monitor progress closely  -RF     Frequency of Treatment (PT) 5 times per week  -RF     Estimated Duration of Therapy (PT) 3 weeks  -RF     Problem List (PT) balance;mobility;range of motion (ROM);strength;pain;postural control  -RF     Activity Limitations Related to Problem List (PT) unable to ambulate safely;unable to transfer safely  -RF     Row Name 05/02/22 1425          Daily Progress Summary (PT)    Functional Goal Overall Progress (PT) progressing toward functional goals as expected  -RF     Daily Progress Summary (PT) Pt continues to require increased assistance with functional mobility utilizing transfer board. Flexion contractures in bilateral hips and L knee remain, however, preexisting comorbidities limit treatment interventions as patient is unable to tolerate supine lying for proper stretching. Continued skilled care required for further improvements to ensure maximum safe function upon D/C.  -RF     Impairments Still Limiting Function (PT) flexibility decreased;functional activity tolerance impairment;pain;strength deficit  -RF     Recommendations (PT) Continue per current POC  -RF     Row Name 05/02/22 1425          Therapy Plan Review/Discharge Plan (PT)    Anticipated Equipment Needs at Discharge (PT Eval) --  tbd  -RF     Anticipated Discharge Disposition (PT) home with home health  -RF     Row Name 05/02/22 1425          IRF PT Goals    Bed Mobility Goal Selection (PT-IRF) bed mobility, PT goal 1;bed mobility, PT goal 2  -RF     Transfer Goal Selection (PT-IRF) transfers, PT goal 1;transfers, PT goal 2  -RF      Balance Goal Selection (PT) balance, PT goal 1;balance, PT goal 2  -RF     Row Name 05/02/22 1425          Bed Mobility Goal 1 (PT-IRF)    Activity/Assistive Device (Bed Mobility Goal 1, PT-IRF) sit to supine/supine to sit  -RF     [place] Level (Bed Mobility Goal 1, PT-IRF) minimum assist (75% or more patient effort)  -RF     Time Frame (Bed Mobility Goal 1, PT-IRF) short-term goal (STG);1 week  -RF     Row Name 05/02/22 1425          Bed Mobility Goal 2 (PT-IRF)    Activity/Assistive Device (Bed Mobility Goal 2, PT-IRF) sit to supine/supine to sit  -RF     [place] Level (Bed Mobility Goal 2, PT-IRF) modified independence  -RF     Time Frame (Bed Mobility Goal 2, PT-IRF) by discharge  -RF     Row Name 05/02/22 1425          Transfer Goal 1 (PT-IRF)    Activity/Assistive Device (Transfer Goal 1, PT-IRF) bed-to-chair/chair-to-bed  -RF     [place] Level (Transfer Goal 1, PT-IRF) moderate assist (50-74% patient effort)  with sliding board  -RF     Time Frame (Transfer Goal 1, PT-IRF) short-term goal (STG);1 week  -RF     Row Name 05/02/22 1425          Transfer Goal 2 (PT-IRF)    Activity/Assistive Device (Transfer Goal 2, PT-IRF) bed-to-chair/chair-to-bed  -RF     [place] Level (Transfer Goal 2, PT-IRF) modified independence  with sliding board  -RF     Time Frame (Transfer Goal 2, PT-IRF) by discharge  -RF     Row Name 05/02/22 1425          Balance Goal 1 (PT)    Activity/Assistive Device (Balance Goal 1, PT) sitting, static  -RF     [place] Level/Cues Needed (Balance Goal 1, PT) contact guard assist  -RF     Time Frame (Balance Goal 1, PT) short term goal (STG);1 week  -RF     Row Name 05/02/22 1425          Balance Goal 2 (PT)    Activity/Assistive Device (Balance Goal 2, PT) sitting, dynamic  -RF     [place] Level (Balance Goal 2, PT) conditional independence  -RF     Time Frame (Balance Goal 2, PT) by discharge  -RF           User Key  (r) = Recorded By, (t) = Taken By, (c) =  Cosigned By    Initials Name Provider Type    RF Dottie Pascual, PTA Physical Therapist Assistant              Wound 04/25/22 1525 Right lower gluteal Pressure Injury (Active)   Pressure Injury Stage 1 05/02/22 0615   Dressing Appearance dry;intact 05/02/22 1000   Base red;non-blanchable 05/02/22 0615   Periwound redness 05/02/22 0615   Care, Wound cleansed with;sterile normal saline;honey applied 05/02/22 0615   Dressing Care dressing changed 05/02/22 0615       Wound 04/25/22 1525 Right upper gluteal Pressure Injury (Active)   Pressure Injury Stage 2 05/02/22 0615   Dressing Appearance dry;intact 05/02/22 1000   Closure None 05/02/22 0615   Base dressing in place, unable to visualize 05/02/22 1000   Periwound pink;redness 05/02/22 0615   Drainage Amount none 05/02/22 0615   Care, Wound cleansed with;sterile normal saline;honey applied 05/02/22 0615   Dressing Care dressing applied 05/02/22 0615       Wound 04/25/22 1525 Bilateral medial sacral spine Pressure Injury (Active)   Pressure Injury Stage 2 05/02/22 0615   Dressing Appearance intact;dry 05/02/22 1000   Closure None 05/02/22 0615   Base dressing in place, unable to visualize 05/02/22 1000   Periwound redness 05/02/22 0615   Periwound Temperature warm 05/02/22 0615   Periwound Skin Turgor soft 05/02/22 0615   Edges irregular 05/02/22 0615   Drainage Amount none 05/02/22 0615   Care, Wound cleansed with;sterile normal saline;honey applied 05/02/22 0615   Dressing Care dressing changed 05/02/22 0615       Wound 04/25/22 1525 Left medial gluteal Pressure Injury (Active)   Pressure Injury Stage 2 05/02/22 0615   Dressing Appearance dry;intact 05/02/22 1000   Closure None 05/02/22 0615   Base dressing in place, unable to visualize 05/02/22 1000   Periwound redness 05/02/22 0615   Periwound Temperature warm 05/02/22 0615   Periwound Skin Turgor soft 05/02/22 0615   Edges irregular 05/02/22 0615   Drainage Amount scant 05/02/22 0615   Care, Wound cleansed  with;sterile normal saline;honey applied 05/02/22 0615   Dressing Care dressing changed 05/02/22 0615       Wound 04/25/22 1255 Left lateral gluteal Pressure Injury (Active)   Pressure Injury Stage 1 05/02/22 0615   Dressing Appearance dry;intact 05/02/22 1000   Closure None 05/02/22 0615   Base dressing in place, unable to visualize 05/02/22 1000   Periwound redness 05/02/22 0615   Periwound Temperature warm 05/02/22 0615   Periwound Skin Turgor soft 05/02/22 0615   Edges irregular 05/02/22 0615   Drainage Amount scant 05/02/22 0615   Care, Wound cleansed with;sterile normal saline;honey applied 05/02/22 0615   Dressing Care dressing changed 05/02/22 0615       Wound 04/25/22 1525 Left knee Amputation stump (Active)   Dressing Appearance open to air 05/02/22 1000   Base dry;clean 05/02/22 0615       Wound 04/25/22 1525 Right knee Amputation stump (Active)   Dressing Appearance dry;intact 05/02/22 1000   Closure Approximated;Staples 05/02/22 0615   Base clean;dry;scab 05/02/22 0615   Drainage Amount none 05/02/22 0615   Care, Wound cleansed with;sterile normal saline 05/02/22 0615   Dressing Care dressing changed 05/02/22 0615   Sutures Removed Intact Yes 05/02/22 0615     Physical Therapy Education                 Title: PT OT SLP Therapies (Done)     Topic: Physical Therapy (Done)     Point: Mobility training (Done)     Learning Progress Summary           Patient Acceptance, E,TB, VU by RF at 5/2/2022 1436      Show all documentation for this point (6)                 Point: Home exercise program (Done)     Learning Progress Summary           Patient Acceptance, E,TB, VU by RF at 5/2/2022 1436      Show all documentation for this point (6)                 Point: Body mechanics (Done)     Learning Progress Summary           Patient Acceptance, E,TB, VU by RF at 5/2/2022 1436      Show all documentation for this point (6)                 Point: Precautions (Done)     Learning Progress Summary           Patient  Acceptance, E,TB, VU by RF at 5/2/2022 1436      Show all documentation for this point (6)                             User Key     Initials Effective Dates Name Provider Type Discipline    RF 06/16/21 -  Dottie Pascual PTA Physical Therapist Assistant PT                PT Recommendation and Plan    Frequency of Treatment (PT): 5 times per week  Anticipated Equipment Needs at Discharge (PT Eval):  (tbd)  Daily Progress Summary (PT)  Functional Goal Overall Progress (PT): progressing toward functional goals as expected  Daily Progress Summary (PT): Pt continues to require increased assistance with functional mobility utilizing transfer board. Flexion contractures in bilateral hips and L knee remain, however, preexisting comorbidities limit treatment interventions as patient is unable to tolerate supine lying for proper stretching. Continued skilled care required for further improvements to ensure maximum safe function upon D/C.  Impairments Still Limiting Function (PT): flexibility decreased, functional activity tolerance impairment, pain, strength deficit  Recommendations (PT): Continue per current POC               Time Calculation:      PT Charges     Row Name 05/02/22 1437 05/02/22 1436          Time Calculation    Start Time 1045  -RF 0835  -RF     Stop Time 1135  -RF 0915  -RF     Time Calculation (min) 50 min  -RF 40 min  -RF     PT Received On 05/02/22  -RF 05/02/22  -RF     PT - Next Appointment 05/03/22  -RF 05/02/22  -RF     PT Goal Re-Cert Due Date 05/03/22  -RF 05/03/22  -RF            Time Calculation- PT    Total Timed Code Minutes- PT 50 minute(s)  -RF 40 minute(s)  -RF           User Key  (r) = Recorded By, (t) = Taken By, (c) = Cosigned By    Initials Name Provider Type    RF Dottie Pascual PTA Physical Therapist Assistant                Therapy Charges for Today     Code Description Service Date Service Provider Modifiers Qty    79643055165 HC PT THER PROC EA 15 MIN 5/2/2022 Dottie Pascual  GISSEL HILL GP, CQ 4    [id number]  PT THERAPEUTIC ACT EA 15 MIN 5/2/2022 Dottie Pascual, GISSEL GP, CQ 2                   Dottie Pascual PTA  5/2/2022

## 2022-05-02 NOTE — PROGRESS NOTES
Occupational Therapy:    Physical Therapy: Individual: 90 minutes.    Speech Language Pathology:    Signed by: Dottie Pascual PTA

## 2022-05-02 NOTE — SIGNIFICANT NOTE
05/02/22 1237   Plan   Plan SS spoke to pt who says he has been communicating with spouse, son and daughter through social media.  Pt says spouse plans to visit tomorrow or Thursday 5-5-22.  Spouse does not have a phone.  Pt does not know daughter's phone number.  Pt has a number for son Javi.  Attempted to call mary [place] [phone] without success; voicemail is not set-up.  Discussed spouse coming to rehab for education prior to discharge.   Pt to tell spouse to talk to SS if she visits this week.  Team conference will be held in am.  Will follow.   Patient/Family in Agreement with Plan yes

## 2022-05-02 NOTE — THERAPY TREATMENT NOTE
Inpatient Rehabilitation - Occupational Therapy Treatment Note     [place]     Patient Name: Syed Grajeda  : 1965  MRN: [id number]    Today's Date: 2022                 Admit Date: 2022         ICD-10-CM ICD-9-CM   1. S/P AKA (above knee amputation) unilateral, right (HCC)  Z89.611 V49.76       Patient Active Problem List   Diagnosis   • Cataract, nuclear sclerotic, right eye   • S/P AKA (above knee amputation) unilateral, right (HCC)       Past Medical History:   Diagnosis Date   • Arthritis    • Diabetes mellitus (CMS/HCC)    • GERD (gastroesophageal reflux disease)    • Hiatal hernia    • IBS (irritable bowel syndrome)        Past Surgical History:   Procedure Laterality Date   • APPENDECTOMY     • BELOW KNEE AMPUTATION Left    • CATARACT EXTRACTION W/ INTRAOCULAR LENS IMPLANT Right 2019    Procedure: CATARACT PHACO EXTRACTION WITH INTRAOCULAR LENS IMPLANT;  Surgeon: Tan Gonzalez MD;  Location: [facility];  Service: Ophthalmology   • TOE AMPUTATION Right     right small toe             IRF OT ASSESSMENT FLOWSHEET (last 12 hours)     IRF OT Evaluation and Treatment     Row Name 22 1022          OT Time and Intention    Document Type daily treatment  -TM     Mode of Treatment occupational therapy  -TM     Patient Effort adequate  -TM     Symptoms Noted During/After Treatment nausea;other (see comments)  RN aware/addressing  -TM     Row Name 22 1022          General Information    General Observations of Patient Pt agreeable for therapy.  -TM     Existing Precautions/Restrictions fall;non-weight bearing;other (see comments)  NWB RLE, decreased skin integrity  -TM     Row Name 22 1022          Cognition/Psychosocial    Orientation Status (Cognition) oriented to;person;place;situation  -TM     Follows Commands (Cognition) follows one-step commands;verbal cues/prompting required  -TM     Row Name 22 1022          Upper Body Dressing    [place] Level  (Upper Body Dressing) set up assistance;verbal cues  -TM     Position (Upper Body Dressing) supported sitting  -TM     Row Name 05/02/22 1022          Self-Feeding    [place] Level (Self-Feeding) modified independence  -TM     Position (Self-Feeding) supported sitting  -TM     Row Name 05/02/22 1022          Motor Skills    Motor Skills coordination;functional endurance;motor control/coordination interventions  -TM     Motor Control/Coordination Interventions therapeutic exercise/ROM;fine motor manipulation/dexterity activities;gross motor coordination activities;other (see comments)  BUE ther ex/act, GMC/FMC, bilateral coord  -TM           User Key  (r) = Recorded By, (t) = Taken By, (c) = Cosigned By    Initials Name Effective Dates     Jenny French OT 06/16/21 -                  Occupational Therapy Education                 Title: PT OT SLP Therapies (Done)     Topic: Occupational Therapy (Done)     Point: ADL training (Done)     Description:   Instruct learner(s) on proper safety adaptation and remediation techniques during self care or transfers.   Instruct in proper use of assistive devices.              Learning Progress Summary           Patient Acceptance, E,D, VU,NR by  at 5/2/2022 1022      Show all documentation for this point (6)                 Point: Precautions (Done)     Description:   Instruct learner(s) on prescribed precautions during self-care and functional transfers.              Learning Progress Summary           Patient Acceptance, E,D, VU,NR by  at 5/2/2022 1022      Show all documentation for this point (6)                             User Key     Initials Effective Dates Name Provider Type Discipline     06/16/21 -  Jenny French OT Occupational Therapist OT                    OT Recommendation and Plan    Planned Therapy Interventions (OT): activity tolerance training, adaptive equipment training, BADL retraining, IADL retraining, occupation/activity  based interventions, patient/caregiver education/training, ROM/therapeutic exercise, strengthening exercise, transfer/mobility retraining                    Time Calculation:      Time Calculation- OT     Row Name 05/02/22 1021             Time Calculation- OT    OT Start Time 0915  -TM      OT Stop Time 1045  -TM      OT Time Calculation (min) 90 min  -TM      Total Timed Code Minutes- OT 90 minute(s)  -TM      OT Non-Billable Time (min) 15 min  -TM            User Key  (r) = Recorded By, (t) = Taken By, (c) = Cosigned By    Initials Name Provider Type    TM Jenny French, OT Occupational Therapist              Therapy Charges for Today     Code Description Service Date Service Provider Modifiers Qty    85388267878 HC OT SELF CARE/MGMT/TRAIN EA 15 MIN 5/2/2022 Jenny French, OT GO 1    97219109987 HC OT THERAPEUTIC ACT EA 15 MIN 5/2/2022 Jenny French OT GO 3    06257207761 HC OT THER PROC EA 15 MIN 5/2/2022 Jenny French OT GO 2                   Jenny French OT  5/2/2022

## 2022-05-03 LAB
ANION GAP SERPL CALCULATED.3IONS-SCNC: 5.6 MMOL/L (ref 5–15)
BASOPHILS # BLD AUTO: 0.02 10*3/MM3 (ref 0–0.2)
BASOPHILS NFR BLD AUTO: 0.4 % (ref 0–1.5)
BUN SERPL-MCNC: 10 MG/DL (ref 6–20)
BUN/CREAT SERPL: 12.3 (ref 7–25)
CALCIUM SPEC-SCNC: 7.8 MG/DL (ref 8.6–10.5)
CHLORIDE SERPL-SCNC: 103 MMOL/L (ref 98–107)
CO2 SERPL-SCNC: 30.4 MMOL/L (ref 22–29)
CREAT SERPL-MCNC: 0.81 MG/DL (ref 0.76–1.27)
DEPRECATED RDW RBC AUTO: 56.9 FL (ref 37–54)
EGFRCR SERPLBLD CKD-EPI 2021: 104.1 ML/MIN/1.73
EOSINOPHIL # BLD AUTO: 0.05 10*3/MM3 (ref 0–0.4)
EOSINOPHIL NFR BLD AUTO: 1.1 % (ref 0.3–6.2)
ERYTHROCYTE [DISTWIDTH] IN BLOOD BY AUTOMATED COUNT: 16.9 % (ref 12.3–15.4)
GLUCOSE BLDC GLUCOMTR-MCNC: 83 MG/DL (ref 70–130)
GLUCOSE BLDC GLUCOMTR-MCNC: 87 MG/DL (ref 70–130)
GLUCOSE BLDC GLUCOMTR-MCNC: 99 MG/DL (ref 70–130)
GLUCOSE SERPL-MCNC: 96 MG/DL (ref 65–99)
HCT VFR BLD AUTO: 26.9 % (ref 37.5–51)
HGB BLD-MCNC: 8.1 G/DL (ref 13–17.7)
IMM GRANULOCYTES # BLD AUTO: 0.01 10*3/MM3 (ref 0–0.05)
IMM GRANULOCYTES NFR BLD AUTO: 0.2 % (ref 0–0.5)
LYMPHOCYTES # BLD AUTO: 2.25 10*3/MM3 (ref 0.7–3.1)
LYMPHOCYTES NFR BLD AUTO: 50.4 % (ref 19.6–45.3)
MCH RBC QN AUTO: 28.2 PG (ref 26.6–33)
MCHC RBC AUTO-ENTMCNC: 30.1 G/DL (ref 31.5–35.7)
MCV RBC AUTO: 93.7 FL (ref 79–97)
MONOCYTES # BLD AUTO: 0.41 10*3/MM3 (ref 0.1–0.9)
MONOCYTES NFR BLD AUTO: 9.2 % (ref 5–12)
NEUTROPHILS NFR BLD AUTO: 1.72 10*3/MM3 (ref 1.7–7)
NEUTROPHILS NFR BLD AUTO: 38.7 % (ref 42.7–76)
NRBC BLD AUTO-RTO: 0 /100 WBC (ref 0–0.2)
PLATELET # BLD AUTO: 605 10*3/MM3 (ref 140–450)
PMV BLD AUTO: 9.5 FL (ref 6–12)
POTASSIUM SERPL-SCNC: 3.9 MMOL/L (ref 3.5–5.2)
RBC # BLD AUTO: 2.87 10*6/MM3 (ref 4.14–5.8)
SODIUM SERPL-SCNC: 139 MMOL/L (ref 136–145)
WBC NRBC COR # BLD: 4.46 10*3/MM3 (ref 3.4–10.8)

## 2022-05-03 PROCEDURE — 85025 COMPLETE CBC W/AUTO DIFF WBC: CPT | Performed by: FAMILY MEDICINE

## 2022-05-03 PROCEDURE — 25010000002 HEPARIN (PORCINE) PER 1000 UNITS: Performed by: INTERNAL MEDICINE

## 2022-05-03 PROCEDURE — 97112 NEUROMUSCULAR REEDUCATION: CPT

## 2022-05-03 PROCEDURE — 97110 THERAPEUTIC EXERCISES: CPT

## 2022-05-03 PROCEDURE — 97530 THERAPEUTIC ACTIVITIES: CPT | Performed by: OCCUPATIONAL THERAPIST

## 2022-05-03 PROCEDURE — 80048 BASIC METABOLIC PNL TOTAL CA: CPT | Performed by: FAMILY MEDICINE

## 2022-05-03 PROCEDURE — 63710000001 ONDANSETRON PER 8 MG: Performed by: INTERNAL MEDICINE

## 2022-05-03 PROCEDURE — 82962 GLUCOSE BLOOD TEST: CPT

## 2022-05-03 PROCEDURE — 99231 SBSQ HOSP IP/OBS SF/LOW 25: CPT | Performed by: FAMILY MEDICINE

## 2022-05-03 PROCEDURE — 97535 SELF CARE MNGMENT TRAINING: CPT | Performed by: OCCUPATIONAL THERAPIST

## 2022-05-03 PROCEDURE — 97530 THERAPEUTIC ACTIVITIES: CPT

## 2022-05-03 PROCEDURE — 97110 THERAPEUTIC EXERCISES: CPT | Performed by: OCCUPATIONAL THERAPIST

## 2022-05-03 RX ADMIN — ATORVASTATIN CALCIUM 40 MG: 40 TABLET, FILM COATED ORAL at 21:02

## 2022-05-03 RX ADMIN — BUPRENORPHINE HYDROCHLORIDE AND NALOXONE HYDROCHLORIDE DIHYDRATE 2 TABLET: 8; 2 TABLET SUBLINGUAL at 10:14

## 2022-05-03 RX ADMIN — METOPROLOL TARTRATE 12.5 MG: 25 TABLET ORAL at 10:15

## 2022-05-03 RX ADMIN — HEPARIN SODIUM 5000 UNITS: 5000 INJECTION INTRAVENOUS; SUBCUTANEOUS at 21:02

## 2022-05-03 RX ADMIN — HEPARIN SODIUM 5000 UNITS: 5000 INJECTION INTRAVENOUS; SUBCUTANEOUS at 10:15

## 2022-05-03 RX ADMIN — FERROUS SULFATE TAB 325 MG (65 MG ELEMENTAL FE) 325 MG: 325 (65 FE) TAB at 10:15

## 2022-05-03 RX ADMIN — Medication 1 TABLET: at 10:15

## 2022-05-03 RX ADMIN — METOPROLOL TARTRATE 12.5 MG: 25 TABLET ORAL at 21:02

## 2022-05-03 RX ADMIN — ASPIRIN 81 MG: 81 TABLET, CHEWABLE ORAL at 10:15

## 2022-05-03 RX ADMIN — FERROUS SULFATE TAB 325 MG (65 MG ELEMENTAL FE) 325 MG: 325 (65 FE) TAB at 17:24

## 2022-05-03 RX ADMIN — ONDANSETRON HYDROCHLORIDE 4 MG: 4 TABLET, FILM COATED ORAL at 09:37

## 2022-05-03 RX ADMIN — FAMOTIDINE 20 MG: 20 TABLET, FILM COATED ORAL at 10:15

## 2022-05-03 NOTE — PROGRESS NOTES
[facility]  PROGRESS NOTE     Patient Identification:  Name:  Syed Grajeda  Age:  57 y.o.  Sex:  male  :  1965  MRN:  [id number]  Visit Number:  [id number]  ROOM: [facility]     Primary Care Provider:  Zheng Serrato MD    Length of stay in inpatient status:  8    Subjective     Chief Compliant:  No chief complaint on file.      History of Presenting Illness: 56-year-old gentleman with a history of right AKA, prior left BKA, diabetes mellitus, ASCVD, sacral decubitus wound, opiate dependency, chronic pain disorder, neuropathy.  Patient states that the nausea seems to be slightly improved this morning.  Has no new complaints otherwise.  Patient is participating in OT PT this morning    Objective     Current Hospital Meds:aspirin, 81 mg, Oral, Daily  atorvastatin, 40 mg, Oral, Nightly  buprenorphine-naloxone, 2 tablet, Sublingual, Daily  famotidine, 20 mg, Oral, Daily  ferrous sulfate, 325 mg, Oral, BID With Meals  heparin (porcine), 5,000 Units, Subcutaneous, Q12H  metoprolol tartrate, 12.5 mg, Oral, Q12H  multivitamin with minerals, 1 tablet, Oral, Daily       ----------------------------------------------------------------------------------------------------------------------  Vital Signs:  Temp:  [97.7 °F (36.5 °C)] 97.7 °F (36.5 °C)  Heart Rate:  [86] 86  Resp:  [20] 20  BP: (109)/(57) 109/57  SpO2:  [97 %] 97 %  on   ;   Device (Oxygen Therapy): room air  Body mass index is 46.39 kg/m².    Wt Readings from Last 3 Encounters:   22 74.8 kg (164 lb 15.5 oz)   19 81.6 kg (180 lb)     Intake & Output (last 3 days)        07 07 07 07 07 07 07 0700    P.O. 1200 1320 1080     Total Intake(mL/kg) 1200 (16) 1320 (17.6) 1080 (14.4)     Urine (mL/kg/hr) 500 (0.3) 900 (0.5)      Stool        Total Output 500 900      Net +700 +420 +1080             Urine Unmeasured Occurrence 2 x 2 x 4 x     Stool Unmeasured Occurrence  1 x           Diet Soft Texture; Ground; Thin; Cardiac, Consistent Carbohydrate  ----------------------------------------------------------------------------------------------------------------------  Physical exam:  Constitutional:   Chronically ill-appearing gentleman in no distress  HEENT: Normocephalic atraumatic  Neck: Supple   Cardiovascular: Regular rate and rhythm  Pulmonary/Chest: Clear to auscultation  Abdominal: Positive bowel sounds soft.   Musculoskeletal: Does have muscle wasting in his hands.  Right AKA; left ALBERT  Neurological: Some generalized weakness but no other focal deficits.  Patient does have stocking sensory changes stocking glove distribution  Skin: Right AKA is dressed  Peripheral vascular:  Genitourinary:  ----------------------------------------------------------------------------------------------------------------------    Last echocardiogram:    ----------------------------------------------------------------------------------------------------------------------  Results from last 7 days   Lab Units 05/01/22  0105 04/30/22  0049 04/28/22  1123 04/28/22  0042   WBC 10*3/mm3 4.39 4.42  --  4.66   HEMOGLOBIN g/dL 7.7* 7.3* 7.7* 6.1*   HEMATOCRIT % 24.9* 24.0* 25.4* 19.7*   MCV fL 91.2 90.9  --  92.5   MCHC g/dL 30.9* 30.4*  --  31.0*   PLATELETS 10*3/mm3 725* 675*  --  496*         Results from last 7 days   Lab Units 05/01/22  0105 04/30/22  0049 04/28/22  0042   SODIUM mmol/L 138 140 140   POTASSIUM mmol/L 3.8 3.8 3.8   CHLORIDE mmol/L 103 105 106   CO2 mmol/L 29.0 27.3 29.1*   BUN mg/dL 9 8 7   CREATININE mg/dL 0.77 0.75* 0.84   CALCIUM mg/dL 7.8* 7.5* 7.3*   GLUCOSE mg/dL 83 64* 84   ALBUMIN g/dL  --  1.57* 1.59*   BILIRUBIN mg/dL  --  0.2 0.2   ALK PHOS U/L  --  157* 151*   AST (SGOT) U/L  --  31 36   ALT (SGPT) U/L  --  26 32   Estimated Creatinine Clearance: 80 mL/min (by C-G formula based on SCr of 0.77 mg/dL).  No results found for: AMMONIA              Glucose   Date/Time Value  Ref Range Status   05/02/2022 2034 78 70 - 130 mg/dL Final     Comment:     Meter: [id number] : [id number] Luis Hart Siri   05/02/2022 1607 96 70 - 130 mg/dL Final     Comment:     Meter: [id number] : [id number] Jerman KEYS   05/02/2022 1124 121 70 - 130 mg/dL Final     Comment:     Meter: [id number] : [id number] Gino Russo   05/02/2022 0609 75 70 - 130 mg/dL Final     Comment:     Meter: [id number] : [id number] Luis Hart Siri   05/01/2022 2054 99 70 - 130 mg/dL Final     Comment:     Meter: [id number] : [id number] Ana BASHIR   05/01/2022 1651 92 70 - 130 mg/dL Final     Comment:     Meter: [id number] : [id number] [place]   05/01/2022 1617 73 70 - 130 mg/dL Final     Comment:     Meter: [id number] : [id number] linda redmond   05/01/2022 1114 90 70 - 130 mg/dL Final     Comment:     Meter: [id number] : [id number] linda nyla     No results found for: TSH, FREET4  No results found for: PREGTESTUR, PREGSERUM, HCG, HCGQUANT  Pain Management Panel    There is no flowsheet data to display.       Brief Urine Lab Results     None        No results found for: BLOODCX      No results found for: URINECX  No results found for: WOUNDCX  No results found for: STOOLCX        I have personally looked at the labs and they are summarized above.  ----------------------------------------------------------------------------------------------------------------------  Detailed radiology reports for the last 24 hours:    Imaging Results (Last 24 Hours)     ** No results found for the last 24 hours. **        Final impressions for the last 30 days of radiology reports:    CT Abdomen Pelvis Without Contrast    Result Date: 4/28/2022   1. Consolidation in the right lung and small bilateral effusions, right greater than left.  2.Thickening of the urinary bladder wall.   3. Mild degree of anasarca.  4. Moderate to large volume stool.     This report was finalized on 4/28/2022  4:59 PM by Dr. mK Adams MD.      US Liver    Result Date: 4/27/2022  Homogeneous echotexture of the liver.  This report was finalized on 4/27/2022 4:22 PM by Dr. Km Adams MD.      I have personally looked at the radiology images and read the final radiology report.    Assessment & Plan    Status post right AKA with remote history of left BKA--patient requiring minimum assistance for up with bed mobility; moderate assistance for transfers; continues to work with therapy on hip knee strengthening exercises.  Required set up for upper body dressing; modified independence for self-feeding.  Continues work on motor skills again to improve ADLs and functional mobility.    Nausea--we did start patient on Vistaril yesterday and held the Reglan.  Patient states he seems to be improving some so we will continue to monitor at this time if does not improve may have to consider evaluation for gastric emptying.    Anemia stable repeat CBC in the a.m.    Thrombocytosis CBC in the a.m.    Diabetes mellitus stable doing well    Sacral decubitus wound continue local care    Hypertension controlled    VTE Prophylaxis:   Mechanical Order History:     None      Pharmalogical Order History:      Ordered     Dose Route Frequency Stop    04/25/22 1529  heparin (porcine) 5000 UNIT/ML injection 5,000 Units         5,000 Units SC Every 12 Hours Scheduled --                    Manohar Piedra MD  [facility]  05/03/22  09:33 EDT

## 2022-05-03 NOTE — SIGNIFICANT NOTE
05/03/22 1300   Plan   Plan Team conference held today.  Spoke to pt about plans for discharge on 5-13-22, how he is doing in therapy, need for spouse to come for education with PT/OT and nursing for wound care.  Pt will message spouse and daughter via [employer] messenger.  Pt is suppose to find out daughter's cell number and provide to SS.  Pt and spouse do not have a vehicle.  Daughter will transport pt's spouse to rehab.  Pt will ask if spouse can come for education on 5-5-22 at 9:00 am or another day before he goes home.  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  Pt plans to return to his apartment at discharge with spouse assisting with care and wound care.   Patient/Family in Agreement with Plan yes

## 2022-05-03 NOTE — PLAN OF CARE
Problem: Rehabilitation (IRF) Plan of Care  Goal: Plan of Care Review  Outcome: Ongoing, Progressing  Flowsheets (Taken 5/3/2022 0319)  Progress: no change  Plan of Care Reviewed With: patient  Outcome Evaluation:   pt calm and cooperative   no acute distress, respirations unlabored   pt resting well throughout the night with bipap in place   no complaints voiced.  Goal: Patient-Specific Goal (Individualized)  Outcome: Ongoing, Progressing  Goal: Absence of New-Onset Illness or Injury  Outcome: Ongoing, Progressing  Intervention: Prevent Fall and Fall Injury  Recent Flowsheet Documentation  Taken 5/3/2022 0200 by Tamiko Perez, RN  Safety Promotion/Fall Prevention:   safety round/check completed   clutter free environment maintained   assistive device/personal items within reach  Taken 5/3/2022 0103 by Tamiko Perez, RN  Safety Promotion/Fall Prevention: safety round/check completed  Taken 5/3/2022 0000 by Tamiko Perez, RN  Safety Promotion/Fall Prevention:   clutter free environment maintained   fall prevention program maintained   safety round/check completed   mobility aid in reach   room organization consistent  Taken 5/2/2022 2200 by Tamiko Perez, RN  Safety Promotion/Fall Prevention:   clutter free environment maintained   fall prevention program maintained   safety round/check completed   mobility aid in reach  Taken 5/2/2022 2000 by Tamiko Perez, RN  Safety Promotion/Fall Prevention:   safety round/check completed   fall prevention program maintained   clutter free environment maintained  Goal: Optimal Comfort and Wellbeing  Outcome: Ongoing, Progressing  Goal: Home and Community Transition Plan Established  Outcome: Ongoing, Progressing   Goal Outcome Evaluation:  Plan of Care Reviewed With: patient        Progress: no change  Outcome Evaluation: pt calm and cooperative; no acute distress, respirations unlabored; pt resting well throughout the  night with bipap in place; no complaints voiced.

## 2022-05-03 NOTE — PROGRESS NOTES
Rehabilitation Nursing  Inpatient Rehabilitation Plan of Care Note    Plan of Care  Pain    Pain Management (Active)  Current Status (4/25/2022 3:25:00 PM): Potential for increased pain  Weekly Goal: Tolerable pain level  Discharge Goal: No pain    Body Function Structure    Skin Integrity (Active)  Current Status (4/25/2022 3:25:00 PM): Impaired skin integrity  Weekly Goal: No more skin breakdown/improvement of skin integrity  Discharge Goal: Improved skin integrity    Safety    Potential for Injury (Active)  Current Status (4/25/2022 3:25:00 PM): Potential for falls  Weekly Goal: No falls  Discharge Goal: No falls    Signed by: Tamiko Perez RN

## 2022-05-03 NOTE — PROGRESS NOTES
Occupational Therapy: Individual: 90 minutes.    Physical Therapy:    Speech Language Pathology:    Signed by: Roz Campoverde OT

## 2022-05-03 NOTE — PLAN OF CARE
Problem: Rehabilitation (IRF) Plan of Care  Goal: Plan of Care Review  5/3/2022 0324 by Tamiko Perez RN  Outcome: Ongoing, Progressing  Flowsheets  Taken 5/3/2022 0324  Plan of Care Reviewed With: patient  Outcome Evaluation:   pt calm and cooperative   awake most of the night   no complaints voices   respirations unlabored   no acute distress noted   dressing changes completed, pt tolerated well.  Taken 5/3/2022 0319  Progress: no change  5/3/2022 0319 by Tamiko Perez RN  Outcome: Ongoing, Progressing  Flowsheets (Taken 5/3/2022 0319)  Progress: no change  Plan of Care Reviewed With: patient  Outcome Evaluation:   pt calm and cooperative   no acute distress, respirations unlabored   pt resting well throughout the night with bipap in place   no complaints voiced.  Goal: Patient-Specific Goal (Individualized)  5/3/2022 0324 by Tamiko Perez RN  Outcome: Ongoing, Progressing  5/3/2022 0319 by Tamiko Perez RN  Outcome: Ongoing, Progressing  Goal: Absence of New-Onset Illness or Injury  5/3/2022 0324 by Tamiko Perez RN  Outcome: Ongoing, Progressing  5/3/2022 0319 by Tamiko Perez RN  Outcome: Ongoing, Progressing  Intervention: Prevent Fall and Fall Injury  Recent Flowsheet Documentation  Taken 5/3/2022 0200 by Tamiko Perez RN  Safety Promotion/Fall Prevention:   safety round/check completed   clutter free environment maintained   assistive device/personal items within reach  Taken 5/3/2022 0103 by Tamiko Perez RN  Safety Promotion/Fall Prevention: safety round/check completed  Taken 5/3/2022 0000 by Tamiko Perez, RN  Safety Promotion/Fall Prevention:   clutter free environment maintained   fall prevention program maintained   safety round/check completed   mobility aid in reach   room organization consistent  Taken 5/2/2022 2200 by Tamiko Perez, RN  Safety Promotion/Fall Prevention:   clutter  free environment maintained   fall prevention program maintained   safety round/check completed   mobility aid in reach  Taken 5/2/2022 2000 by Tamiko Perez, RN  Safety Promotion/Fall Prevention:   safety round/check completed   fall prevention program maintained   clutter free environment maintained  Goal: Optimal Comfort and Wellbeing  5/3/2022 0324 by Tamiko Perez, NORMAN  Outcome: Ongoing, Progressing  5/3/2022 0319 by Tamiko Perez, RN  Outcome: Ongoing, Progressing  Goal: Home and Community Transition Plan Established  5/3/2022 0324 by Tamiko Perez, NORMAN  Outcome: Ongoing, Progressing  5/3/2022 0319 by Tamiko Perez, NORMAN  Outcome: Ongoing, Progressing   Goal Outcome Evaluation:  Plan of Care Reviewed With: patient        Progress: no change  Outcome Evaluation: pt calm and cooperative; awake most of the night; no complaints voices; respirations unlabored; no acute distress noted; dressing changes completed, pt tolerated well.

## 2022-05-03 NOTE — THERAPY PROGRESS REPORT/RE-CERT
Inpatient Rehabilitation - Physical Therapy Progress Note        [place]     Patient Name: Syed Grajeda  : 1965  MRN: [id number]    Today's Date: 5/3/2022                    Admit Date: 2022      Visit Dx:     ICD-10-CM ICD-9-CM   1. S/P AKA (above knee amputation) unilateral, right (HCC)  Z89.611 V49.76       Patient Active Problem List   Diagnosis   • Cataract, nuclear sclerotic, right eye   • S/P AKA (above knee amputation) unilateral, right (HCC)       Past Medical History:   Diagnosis Date   • Arthritis    • Diabetes mellitus (CMS/HCC)    • GERD (gastroesophageal reflux disease)    • Hiatal hernia    • IBS (irritable bowel syndrome)        Past Surgical History:   Procedure Laterality Date   • APPENDECTOMY     • BELOW KNEE AMPUTATION Left    • CATARACT EXTRACTION W/ INTRAOCULAR LENS IMPLANT Right 2019    Procedure: CATARACT PHACO EXTRACTION WITH INTRAOCULAR LENS IMPLANT;  Surgeon: Tan Gonzalez MD;  Location: [facility];  Service: Ophthalmology   • TOE AMPUTATION Right     right small toe       PT ASSESSMENT (last 12 hours)     IRF PT Evaluation and Treatment     Row Name 22 1545          PT Time and Intention    Document Type progress note;daily treatment  -RF     Mode of Treatment individual therapy;physical therapy  -RF     Patient/Family/Caregiver Comments/Observations Pt c/o low back and BLE pain with treatment this date.  -RF     Row Name 22 1545          General Information    Patient Profile Reviewed yes  -RF     Existing Precautions/Restrictions fall  <skin integrity, BLE amputations  -RF     Row Name 22 1545          Pain Scale: FACES Pre/Post-Treatment    Pain: FACES Scale, Pretreatment 4-->hurts little more  -RF     Posttreatment Pain Rating 6-->hurts even more  -RF     Row Name 22 1545          Cognition/Psychosocial    Affect/Mental Status (Cognition) WFL  -RF     Follows Commands (Cognition) verbal cues/prompting required  -RF     Row Name  05/03/22 1545          Bed Mobility    Supine-Sit [place] (Bed Mobility) minimum assist (75% patient effort);moderate assist (50% patient effort)  -RF     Sit-Supine [place] (Bed Mobility) minimum assist (75% patient effort);moderate assist (50% patient effort)  -RF     Row Name 05/03/22 1545          Transfers    Bed-Chair [place] (Transfers) moderate assist (50% patient effort);minimum assist (75% patient effort)  -RF     Chair-Bed [place] (Transfers) moderate assist (50% patient effort);minimum assist (75% patient effort)  -RF     Assistive Device (Bed-Chair Transfers) transfer board  -RF     Row Name 05/03/22 1545          Chair-Bed Transfer    Assistive Device (Chair-Bed Transfers) transfer board  -RF     Row Name 05/03/22 1545          Safety Issues, Functional Mobility    Impairments Affecting Function (Mobility) balance;endurance/activity tolerance;pain;postural/trunk control;range of motion (ROM);strength  -     Row Name 05/03/22 1545          Balance    Dynamic Sitting Balance contact guard  Some instances of LOB noted requiring Amy to regain balance  -RF     Position, Sitting Balance sitting edge of mat  -RF     Comment, Balance Pt performed chest press, overhead press, and circumduction with ball while sitting unsupported.  -     Row Name 05/03/22 1545          Motor Skills    Therapeutic Exercise --  Pt educated on importance of slef stretching and compliance with HEP for improvements in joint ROM and contracture prevention.  -     Row Name 05/03/22 1545          Hip (Therapeutic Exercise)    Hip PROM (Therapeutic Exercise) right;extension;supine;10 second hold;10 repetitions;other (see comments)  Pt unable to tolerate true supine lying; pt maintains a constant inclined postion with hips flexed, therefore, it is difficult to achieve full hip extension.  -RF     Hip Strengthening (Therapeutic Exercise) bilateral;flexion;extension;aBduction;aDduction;external rotation;internal  rotation;supine;2 sets;10 repetitions  -RF     Row Name 05/03/22 1545          Knee (Therapeutic Exercise)    Knee PROM (Therapeutic Exercise) left;extension;sitting;supine;10 second hold;10 repetitions;other (see comments)  flexion contracture noted; slight impocements observed with PROM stretching.  -RF     Knee Strengthening (Therapeutic Exercise) left;LAQ (long arc quad);sitting;supine;2 sets;10 repetitions  -RF     Row Name 05/03/22 1545          LE Residual Limb    Additional Documentation --  Residual limb wrapping performed; pt educated on proper technique.  -RF     Row Name 05/03/22 1545          Positioning and Restraints    Pre-Treatment Position in bed  chair 2nd session  -RF     In Wheelchair sitting;call light within reach;encouraged to call for assist  with OT 1st session  -RF     Row Name 05/03/22 1545          Therapy Assessment/Plan (PT)    Patient's Goals For Discharge return home  -RF     Row Name 05/03/22 1545          Therapy Assessment/Plan (PT)    Rehab Potential/Prognosis (PT) adequate, monitor progress closely  -RF     Frequency of Treatment (PT) 5 times per week  -RF     Estimated Duration of Therapy (PT) 3 weeks  -RF     Problem List (PT) balance;mobility;range of motion (ROM);strength;pain;postural control  -RF     Activity Limitations Related to Problem List (PT) unable to ambulate safely;unable to transfer safely  -RF     Row Name 05/03/22 1545          Daily Progress Summary (PT)    Functional Goal Overall Progress (PT) progressing toward functional goals as expected  -RF     Daily Progress Summary (PT) Significant debility and pain limits patient at this time. Improvements noted in functional mobility with use of transfer board, however, increased assistance continually required. Pt continues to require skilled care for further strengthening to ensure maixmum safe function upon D/C.  -RF     Impairments Still Limiting Function (PT) flexibility decreased;functional activity tolerance  impairment;pain;strength deficit  -RF     Recommendations (PT) Continue per current POC  -RF     Row Name 05/03/22 1545          Therapy Plan Review/Discharge Plan (PT)    Anticipated Equipment Needs at Discharge (PT Eval) --  tbd  -RF     Anticipated Discharge Disposition (PT) home with home health  -RF     Row Name 05/03/22 1545          IRF PT Goals    Bed Mobility Goal Selection (PT-IRF) bed mobility, PT goal 1;bed mobility, PT goal 2  -RF     Transfer Goal Selection (PT-IRF) transfers, PT goal 1;transfers, PT goal 2  -RF     Balance Goal Selection (PT) balance, PT goal 1;balance, PT goal 2  -RF     Row Name 05/03/22 1545          Bed Mobility Goal 1 (PT-IRF)    Activity/Assistive Device (Bed Mobility Goal 1, PT-IRF) sit to supine/supine to sit  -RF     [place] Level (Bed Mobility Goal 1, PT-IRF) minimum assist (75% or more patient effort)  -RF     Time Frame (Bed Mobility Goal 1, PT-IRF) short-term goal (STG);1 week  -RF     Progress/Outcomes (Bed Mobility Goal 1, PT-IRF) goal ongoing  -RF     Row Name 05/03/22 1545          Bed Mobility Goal 2 (PT-IRF)    Activity/Assistive Device (Bed Mobility Goal 2, PT-IRF) sit to supine/supine to sit  -RF     [place] Level (Bed Mobility Goal 2, PT-IRF) modified independence  -RF     Time Frame (Bed Mobility Goal 2, PT-IRF) by discharge  -RF     Progress/Outcomes (Bed Mobility Goal 2, PT-IRF) goal ongoing  -RF     Row Name 05/03/22 1545          Transfer Goal 1 (PT-IRF)    Activity/Assistive Device (Transfer Goal 1, PT-IRF) bed-to-chair/chair-to-bed  -RF     [place] Level (Transfer Goal 1, PT-IRF) moderate assist (50-74% patient effort)  with sliding board  -RF     Time Frame (Transfer Goal 1, PT-IRF) short-term goal (STG);1 week  -RF     Progress/Outcomes (Transfer Goal 1, PT-IRF) goal ongoing  -RF     Row Name 05/03/22 1545          Transfer Goal 2 (PT-IRF)    Activity/Assistive Device (Transfer Goal 2, PT-IRF) bed-to-chair/chair-to-bed  -RF      [place] Level (Transfer Goal 2, PT-IRF) modified independence  with sliding board  -RF     Time Frame (Transfer Goal 2, PT-IRF) by discharge  -RF     Progress/Outcomes (Transfer Goal 2, PT-IRF) goal ongoing  -RF     Row Name 05/03/22 1545          Balance Goal 1 (PT)    Activity/Assistive Device (Balance Goal 1, PT) sitting, static  -RF     [place] Level/Cues Needed (Balance Goal 1, PT) contact guard assist  -RF     Time Frame (Balance Goal 1, PT) short term goal (STG);1 week  -RF     Progress/Outcomes (Balance Goal 1, PT) goal ongoing  -RF     Row Name 05/03/22 1545          Balance Goal 2 (PT)    Activity/Assistive Device (Balance Goal 2, PT) sitting, dynamic  -RF     [place] Level (Balance Goal 2, PT) conditional independence  -RF     Time Frame (Balance Goal 2, PT) by discharge  -RF     Progress/Outcome (Balance Goal 2, PT) goal ongoing  -RF           User Key  (r) = Recorded By, (t) = Taken By, (c) = Cosigned By    Initials Name Provider Type    RF Dottie Pascual, PTA Physical Therapist Assistant              Wound 04/25/22 1525 Right lower gluteal Pressure Injury (Active)   Pressure Injury Stage 1 05/03/22 0600   Dressing Appearance intact;dry 05/03/22 1050   Closure None 05/03/22 0600   Base dressing in place, unable to visualize 05/03/22 1050       Wound 04/25/22 1525 Right upper gluteal Pressure Injury (Active)   Pressure Injury Stage 2 05/03/22 0600   Dressing Appearance dry;intact 05/03/22 1050   Closure None 05/03/22 0600   Base dressing in place, unable to visualize 05/03/22 1050   Periwound pink;redness 05/03/22 0600   Drainage Amount none 05/03/22 0600   Dressing Care dressing applied 05/03/22 0600       Wound 04/25/22 1525 Bilateral medial sacral spine Pressure Injury (Active)   Pressure Injury Stage 2 05/03/22 0600   Dressing Appearance dry;intact 05/03/22 1050   Closure None 05/03/22 0600   Base dressing in place, unable to visualize 05/03/22 1050   Periwound redness 05/03/22 0600    Periwound Temperature warm 05/03/22 0600   Periwound Skin Turgor soft 05/03/22 0600   Edges irregular 05/03/22 0600   Drainage Amount none 05/03/22 0600   Dressing Care dressing changed 05/03/22 0600       Wound 04/25/22 1525 Left medial gluteal Pressure Injury (Active)   Pressure Injury Stage 2 05/03/22 0600   Dressing Appearance dry;intact 05/03/22 1050   Closure None 05/03/22 0600   Base dressing in place, unable to visualize 05/03/22 1050   Periwound redness 05/03/22 0600   Periwound Temperature warm 05/03/22 0600   Periwound Skin Turgor soft 05/03/22 0600   Edges irregular 05/03/22 0600   Dressing Care dressing changed 05/03/22 0600       Wound 04/25/22 1255 Left lateral gluteal Pressure Injury (Active)   Pressure Injury Stage 1 05/03/22 0600   Dressing Appearance dry;intact 05/03/22 1050   Closure None 05/03/22 0600   Base dressing in place, unable to visualize 05/03/22 1050   Periwound redness 05/03/22 0600   Periwound Temperature warm 05/03/22 0600   Periwound Skin Turgor soft 05/03/22 0600   Edges irregular 05/03/22 0600   Drainage Amount scant 05/03/22 0600   Dressing Care dressing changed 05/03/22 0600       Wound 04/25/22 1525 Left knee Amputation stump (Active)   Dressing Appearance open to air 05/03/22 1050   Base clean;dry 05/03/22 1050   Dressing Care open to air 05/03/22 1050       Wound 04/25/22 1525 Right knee Amputation stump (Active)   Dressing Appearance dry;intact 05/03/22 1050   Closure Approximated;Staples 05/03/22 0600   Base dressing in place, unable to visualize 05/03/22 1050   Periwound edematous 05/03/22 0600   Drainage Amount none 05/03/22 0600   Dressing Care dressing changed 05/03/22 0600     Physical Therapy Education                 Title: PT OT SLP Therapies (Done)     Topic: Physical Therapy (Done)     Point: Mobility training (Done)     Learning Progress Summary           Patient Acceptance, E,TB, VU,NR by RF at 5/3/2022 7624      Show all documentation for this point (7)                  Point: Home exercise program (Done)     Learning Progress Summary           Patient Acceptance, E,TB, VU,NR by RF at 5/3/2022 1554      Show all documentation for this point (7)                 Point: Body mechanics (Done)     Learning Progress Summary           Patient Acceptance, E,TB, VU,NR by RF at 5/3/2022 1554      Show all documentation for this point (7)                 Point: Precautions (Done)     Learning Progress Summary           Patient Acceptance, E,TB, VU,NR by RF at 5/3/2022 1554      Show all documentation for this point (7)                             User Key     Initials Effective Dates Name Provider Type Discipline     06/16/21 -  Dottie Pascual, PTA Physical Therapist Assistant PT                PT Recommendation and Plan    Frequency of Treatment (PT): 5 times per week  Anticipated Equipment Needs at Discharge (PT Eval):  (tbd)  Daily Progress Summary (PT)  Functional Goal Overall Progress (PT): progressing toward functional goals as expected  Daily Progress Summary (PT): Significant debility and pain limits patient at this time. Improvements noted in functional mobility with use of transfer board, however, increased assistance continually required. Pt continues to require skilled care for further strengthening to ensure maixmum safe function upon D/C.  Impairments Still Limiting Function (PT): flexibility decreased, functional activity tolerance impairment, pain, strength deficit  Recommendations (PT): Continue per current POC               Time Calculation:      PT Charges     Row Name 05/03/22 1556 05/03/22 1554          Time Calculation    Start Time 1055  -RF 0845  -RF     Stop Time 1145  -RF 0925  -RF     Time Calculation (min) 50 min  -RF 40 min  -RF     PT Received On 05/03/22  -RF 05/03/22  -RF     PT - Next Appointment 05/04/22  -RF 05/03/22  -RF     PT Goal Re-Cert Due Date 05/10/22  -RF 05/03/22  -RF            Time Calculation- PT    Total Timed Code Minutes- PT  50 minute(s)  -RF 40 minute(s)  -RF           User Key  (r) = Recorded By, (t) = Taken By, (c) = Cosigned By    Initials Name Provider Type    RF Dottie Pascual, GISSEL Physical Therapist Assistant                Therapy Charges for Today     Code Description Service Date Service Provider Modifiers Qty    37687680927 HC PT THER PROC EA 15 MIN 5/2/2022 Dottie Pascual, PTA GP, CQ 4    32109977833 HC PT THERAPEUTIC ACT EA 15 MIN 5/2/2022 Dottie Pascual, PTA GP, CQ 2    54008416539 HC PT NEUROMUSC RE EDUCATION EA 15 MIN 5/3/2022 Dottie Pascual, PTA GP, CQ 1    38835391437 HC PT THER PROC EA 15 MIN 5/3/2022 Dottie Pascual, PTA GP, CQ 3    53400220119 HC PT THERAPEUTIC ACT EA 15 MIN 5/3/2022 Dottie Pascual, PTA GP, CQ 2                   Dottie Pascual PTA  5/3/2022

## 2022-05-03 NOTE — SIGNIFICANT NOTE
05/03/22 3782   Plan   Plan Contacted phone number pt gave SS for son Javi [phone] which is a wrong number.

## 2022-05-03 NOTE — THERAPY TREATMENT NOTE
Inpatient Rehabilitation - Occupational Therapy Treatment Note     [place]     Patient Name: Syed Grajeda  : 1965  MRN: [id number]    Today's Date: 5/3/2022                 Admit Date: 2022         ICD-10-CM ICD-9-CM   1. S/P AKA (above knee amputation) unilateral, right (HCC)  Z89.611 V49.76       Patient Active Problem List   Diagnosis   • Cataract, nuclear sclerotic, right eye   • S/P AKA (above knee amputation) unilateral, right (HCC)       Past Medical History:   Diagnosis Date   • Arthritis    • Diabetes mellitus (CMS/HCC)    • GERD (gastroesophageal reflux disease)    • Hiatal hernia    • IBS (irritable bowel syndrome)        Past Surgical History:   Procedure Laterality Date   • APPENDECTOMY     • BELOW KNEE AMPUTATION Left    • CATARACT EXTRACTION W/ INTRAOCULAR LENS IMPLANT Right 2019    Procedure: CATARACT PHACO EXTRACTION WITH INTRAOCULAR LENS IMPLANT;  Surgeon: Tan Gonzalez MD;  Location: [facility];  Service: Ophthalmology   • TOE AMPUTATION Right     right small toe             IRF OT ASSESSMENT FLOWSHEET (last 12 hours)     IRF OT Evaluation and Treatment     Row Name 22 1221          OT Time and Intention    Document Type daily treatment  -BF     Mode of Treatment occupational therapy  -BF     Patient Effort adequate  -BF     Symptoms Noted During/After Treatment fatigue  -BF     Row Name 22 1221          General Information    Existing Precautions/Restrictions fall  <skin integrity, BLE amputations  -BF     Row Name 22 1221          Cognition/Psychosocial    Orientation Status (Cognition) oriented x 3  -BF     Follows Commands (Cognition) follows one-step commands;verbal cues/prompting required;repetition of directions required  -BF     Row Name 22 1221          Grooming    [place] Level (Grooming) set up  -BF     Position (Grooming) supported sitting  -BF     Row Name 22 1221          Motor Skills    Motor Control/Coordination  Interventions therapeutic exercise/ROM;fine motor manipulation/dexterity activities;gross motor coordination activities  BUE GMC/FMC theract, strengthening; rickshaw 10 lbs X10X3, BUE PRE 4 mins X2  -BF     Row Name 05/03/22 1221          Positioning and Restraints    In Wheelchair with PT  -BF           User Key  (r) = Recorded By, (t) = Taken By, (c) = Cosigned By    Initials Name Effective Dates    BF Roz Campoverde OT 06/16/21 -                  Occupational Therapy Education                 Title: PT OT SLP Therapies (Done)     Topic: Occupational Therapy (Done)     Point: ADL training (Done)     Description:   Instruct learner(s) on proper safety adaptation and remediation techniques during self care or transfers.   Instruct in proper use of assistive devices.              Learning Progress Summary           Patient Acceptance, E, VU,NR by  at 5/3/2022 1221      Show all documentation for this point (7)                 Point: Precautions (Done)     Description:   Instruct learner(s) on prescribed precautions during self-care and functional transfers.              Learning Progress Summary           Patient Acceptance, E, VU,NR by  at 5/3/2022 1221      Show all documentation for this point (7)                             User Key     Initials Effective Dates Name Provider Type Discipline     06/16/21 -  Roz Campoverde OT Occupational Therapist OT                    OT Recommendation and Plan                         Time Calculation:      Time Calculation- OT     Row Name 05/03/22 1225             Time Calculation- OT    OT Start Time 0925  -BF      OT Stop Time 1055  -      OT Time Calculation (min) 90 min  -BF      Total Timed Code Minutes- OT 90 minute(s)  -BF      OT Non-Billable Time (min) 10 min  -BF            User Key  (r) = Recorded By, (t) = Taken By, (c) = Cosigned By    Initials Name Provider Type     Roz Campoverde OT Occupational Therapist              Therapy  Charges for Today     Code Description Service Date Service Provider Modifiers Qty    33963809598 HC OT SELF CARE/MGMT/TRAIN EA 15 MIN 5/3/2022 Roz Campoverde OT GO 1    84209825756 HC OT THER PROC EA 15 MIN 5/3/2022 Roz Campoverde OT GO 3    91881287029 HC OT THERAPEUTIC ACT EA 15 MIN 5/3/2022 Roz Campoverde OT GO 2                   Roz Campoverde OT  5/3/2022

## 2022-05-04 LAB
GLUCOSE BLDC GLUCOMTR-MCNC: 136 MG/DL (ref 70–130)
GLUCOSE BLDC GLUCOMTR-MCNC: 76 MG/DL (ref 70–130)
GLUCOSE BLDC GLUCOMTR-MCNC: 79 MG/DL (ref 70–130)
GLUCOSE BLDC GLUCOMTR-MCNC: 98 MG/DL (ref 70–130)

## 2022-05-04 PROCEDURE — 97535 SELF CARE MNGMENT TRAINING: CPT

## 2022-05-04 PROCEDURE — 97110 THERAPEUTIC EXERCISES: CPT

## 2022-05-04 PROCEDURE — 97530 THERAPEUTIC ACTIVITIES: CPT

## 2022-05-04 PROCEDURE — 25010000002 HEPARIN (PORCINE) PER 1000 UNITS: Performed by: INTERNAL MEDICINE

## 2022-05-04 PROCEDURE — 82962 GLUCOSE BLOOD TEST: CPT

## 2022-05-04 PROCEDURE — 87493 C DIFF AMPLIFIED PROBE: CPT | Performed by: FAMILY MEDICINE

## 2022-05-04 PROCEDURE — 99231 SBSQ HOSP IP/OBS SF/LOW 25: CPT | Performed by: FAMILY MEDICINE

## 2022-05-04 RX ADMIN — FAMOTIDINE 20 MG: 20 TABLET, FILM COATED ORAL at 08:29

## 2022-05-04 RX ADMIN — METOPROLOL TARTRATE 12.5 MG: 25 TABLET ORAL at 20:41

## 2022-05-04 RX ADMIN — FERROUS SULFATE TAB 325 MG (65 MG ELEMENTAL FE) 325 MG: 325 (65 FE) TAB at 08:29

## 2022-05-04 RX ADMIN — HEPARIN SODIUM 5000 UNITS: 5000 INJECTION INTRAVENOUS; SUBCUTANEOUS at 20:41

## 2022-05-04 RX ADMIN — Medication 1 TABLET: at 08:29

## 2022-05-04 RX ADMIN — ATORVASTATIN CALCIUM 40 MG: 40 TABLET, FILM COATED ORAL at 20:41

## 2022-05-04 RX ADMIN — ASPIRIN 81 MG: 81 TABLET, CHEWABLE ORAL at 08:29

## 2022-05-04 RX ADMIN — ACETAMINOPHEN 650 MG: 325 TABLET ORAL at 20:41

## 2022-05-04 RX ADMIN — METOPROLOL TARTRATE 12.5 MG: 25 TABLET ORAL at 08:29

## 2022-05-04 RX ADMIN — BUPRENORPHINE HYDROCHLORIDE AND NALOXONE HYDROCHLORIDE DIHYDRATE 2 TABLET: 8; 2 TABLET SUBLINGUAL at 08:29

## 2022-05-04 RX ADMIN — HEPARIN SODIUM 5000 UNITS: 5000 INJECTION INTRAVENOUS; SUBCUTANEOUS at 08:29

## 2022-05-04 RX ADMIN — FERROUS SULFATE TAB 325 MG (65 MG ELEMENTAL FE) 325 MG: 325 (65 FE) TAB at 17:31

## 2022-05-04 NOTE — THERAPY TREATMENT NOTE
Inpatient Rehabilitation - Occupational Therapy Progress Note and Treatment Note     Jake     Patient Name: Syed Grajeda  : 1965  MRN: [id number]    Today's Date: 2022                 Admit Date: 2022         ICD-10-CM ICD-9-CM   1. S/P AKA (above knee amputation) unilateral, right (HCC)  Z89.611 V49.76       Patient Active Problem List   Diagnosis   • Cataract, nuclear sclerotic, right eye   • S/P AKA (above knee amputation) unilateral, right (HCC)       Past Medical History:   Diagnosis Date   • Arthritis    • Diabetes mellitus (CMS/HCC)    • GERD (gastroesophageal reflux disease)    • Hiatal hernia    • IBS (irritable bowel syndrome)        Past Surgical History:   Procedure Laterality Date   • APPENDECTOMY     • BELOW KNEE AMPUTATION Left    • CATARACT EXTRACTION W/ INTRAOCULAR LENS IMPLANT Right 2019    Procedure: CATARACT PHACO EXTRACTION WITH INTRAOCULAR LENS IMPLANT;  Surgeon: Tan Gonzalez MD;  Location: [facility];  Service: Ophthalmology   • TOE AMPUTATION Right     right small toe             IRF OT ASSESSMENT FLOWSHEET (last 12 hours)     IRF OT Evaluation and Treatment     Row Name 22 4138          OT Time and Intention    Document Type daily treatment;progress note  -TM     Mode of Treatment occupational therapy  -TM     Patient Effort adequate  -TM     Symptoms Noted During/After Treatment none  -TM     Row Name 22 8465          General Information    General Observations of Patient Pt agreeable for therapy.  -TM     Existing Precautions/Restrictions fall;non-weight bearing;other (see comments)  NWB RLE, decreased skin integrity, isolation precautions  -TM     Row Name 22 3317          Cognition/Psychosocial    Orientation Status (Cognition) oriented to;person;place;situation  -TM     Follows Commands (Cognition) follows one-step commands;verbal cues/prompting required  -TM     Row Name 22 8793          Bathing    Position (Bathing)  supine;edge of bed sitting  -TM     Comment (Bathing) Amy  -TM     Row Name 05/04/22 1259          Upper Body Dressing    Position (Upper Body Dressing) edge of bed sitting  -TM     Comment (Upper Body Dressing) setup  -TM     Row Name 05/04/22 1259          Lower Body Dressing    [place] Level (Lower Body Dressing) minimum assist (75% patient effort);verbal cues  -TM     Position (Lower Body Dressing) supine  -TM     Row Name 05/04/22 1259          Grooming    [place] Level (Grooming) set up  -TM     Position (Grooming) supported sitting  -TM     Row Name 05/04/22 1259          Toileting    Position (Toileting) supine  -TM     Comment (Toileting) maxA/modA  -TM     Row Name 05/04/22 1259          Transfers    Bed-Chair [place] (Transfers) moderate assist (50% patient effort);minimum assist (75% patient effort);2 person assist;verbal cues  -TM     Assistive Device (Bed-Chair Transfers) wheelchair;transfer board  -TM     Row Name 05/04/22 1259          Toilet Transfer    Comment, (Toilet Transfer) pt declined to attempt toilet transfer on this date. Pt reported he doesn't plan to utilize commode at home at discharge.  -TM     Row Name 05/04/22 1259          Motor Skills    Motor Skills coordination;functional endurance;motor control/coordination interventions  -     Motor Control/Coordination Interventions therapeutic exercise/ROM;fine motor manipulation/dexterity activities;gross motor coordination activities;other (see comments)  BUE ther ex/act, [facility] [place]  -     Row Name 05/04/22 1259          UB Dressing Goal 1 (OT-IRF)    Progress/Outcomes (UB Dressing Goal 1, OT-IRF) goal met  -TM     Row Name 05/04/22 1259          UB Dressing Goal 2 (OT-IRF)    Progress/Outcomes (UB Dressing Goal 2, OT-IRF) goal met  -     Row Name 05/04/22 1318 05/04/22 1259       LB Dressing Goal 1 (OT-IRF)    [place] (LB Dressing Goal 1, OT-IRF) contact guard required  -TM --    Time Frame (LB Dressing Goal 1,  OT-IRF) short-term goal (STG)  -TM --    Progress/Outcomes (LB Dressing Goal 1, OT-IRF) -- goal met  -TM    Row Name 05/04/22 1259          LB Dressing Goal 2 (OT-IRF)    Progress/Outcomes (LB Dressing Goal 2, OT-IRF) goal met  -TM     Row Name 05/04/22 1318 05/04/22 1259       Toileting Goal 1 (OT-IRF)    [place] Level (Toileting Goal 1, OT-IRF) moderate assist (50-74% patient effort)  -TM --    Progress/Outcomes (Toileting Goal 1, OT-IRF) -- goal met  -TM    Time Frame (Toileting Goal 1, OT-IRF) short-term goal (STG)  -TM --          User Key  (r) = Recorded By, (t) = Taken By, (c) = Cosigned By    Initials Name Effective Dates     Jenny French OT 06/16/21 -                  Occupational Therapy Education                 Title: PT OT SLP Therapies (Done)     Topic: Occupational Therapy (Done)     Point: ADL training (Done)     Description:   Instruct learner(s) on proper safety adaptation and remediation techniques during self care or transfers.   Instruct in proper use of assistive devices.              Learning Progress Summary           Patient Acceptance, E,D, VU,NR by  at 5/4/2022 1511      Show all documentation for this point (8)                 Point: Precautions (Done)     Description:   Instruct learner(s) on prescribed precautions during self-care and functional transfers.              Learning Progress Summary           Patient Acceptance, E,D, VU,NR by  at 5/4/2022 1511      Show all documentation for this point (8)                             User Key     Initials Effective Dates Name Provider Type Discipline     06/16/21 -  Jenny French OT Occupational Therapist OT                    OT Recommendation and Plan    Planned Therapy Interventions (OT): activity tolerance training, adaptive equipment training, BADL retraining, IADL retraining, occupation/activity based interventions, patient/caregiver education/training, ROM/therapeutic exercise, strengthening exercise,  transfer/mobility retraining                    Time Calculation:      Time Calculation- OT     Row Name 05/04/22 1319             Time Calculation- OT    OT Start Time 0915  -TM      OT Stop Time 1045  -TM      OT Time Calculation (min) 90 min  -TM      Total Timed Code Minutes- OT 90 minute(s)  -TM      OT Non-Billable Time (min) 15 min  -TM            User Key  (r) = Recorded By, (t) = Taken By, (c) = Cosigned By    Initials Name Provider Type     Jenny French OT Occupational Therapist              Therapy Charges for Today     Code Description Service Date Service Provider Modifiers Qty    [id number]  OT SELF CARE/MGMT/TRAIN EA 15 MIN 5/4/2022 Jenny French, OT GO 3    [id number]  OT THERAPEUTIC ACT EA 15 MIN 5/4/2022 Jenny French OT GO 3                   Jenyn French OT  5/4/2022

## 2022-05-04 NOTE — PROGRESS NOTES
Occupational Therapy:    Physical Therapy: Individual: 90 minutes.    Speech Language Pathology:    Signed by: Adam Goodman PTA

## 2022-05-04 NOTE — SIGNIFICANT NOTE
05/04/22 0935   Plan   Plan SS spoke to pt who says his spouse and daughter are suppose to come to rehab on 5-5-22 for education and may get here after 9:00 am.  Informed pt phone number that he gave SS for his son is a wrong number.  Pt will inform SS if he gets daughter's phone number.  Pt communicates with family via [employer] messenger.

## 2022-05-04 NOTE — PROGRESS NOTES
Occupational Therapy: Individual: 90 minutes.    Physical Therapy:    Speech Language Pathology:    Signed by: Jenny French, Occupational Therapist

## 2022-05-04 NOTE — PLAN OF CARE
Problem: Rehabilitation (IRF) Plan of Care  Goal: Plan of Care Review  Outcome: Ongoing, Progressing  Flowsheets (Taken 5/4/2022 0602)  Progress: improving  Plan of Care Reviewed With: patient  Outcome Evaluation:   pt resting well throughout the night   pt had one occurance of loose watery diarhea throughout the night   no acute distress noted   pt bath completed, pt was able to perform task with minimal assistance from staff   pt calm and cooperative  Goal: Patient-Specific Goal (Individualized)  Outcome: Ongoing, Progressing  Goal: Absence of New-Onset Illness or Injury  Outcome: Ongoing, Progressing  Intervention: Prevent Fall and Fall Injury  Recent Flowsheet Documentation  Taken 5/4/2022 0600 by Tamiko Perez, RN  Safety Promotion/Fall Prevention:   safety round/check completed   clutter free environment maintained  Taken 5/4/2022 0400 by Tamiko Perez, RN  Safety Promotion/Fall Prevention:   safety round/check completed   clutter free environment maintained  Taken 5/4/2022 0200 by Tamiko Perez, RN  Safety Promotion/Fall Prevention:   room organization consistent   safety round/check completed   fall prevention program maintained   assistive device/personal items within reach   nonskid shoes/slippers when out of bed   clutter free environment maintained  Taken 5/4/2022 0000 by Tamiko Perez, RN  Safety Promotion/Fall Prevention:   assistive device/personal items within reach   nonskid shoes/slippers when out of bed   safety round/check completed   fall prevention program maintained   room organization consistent  Goal: Optimal Comfort and Wellbeing  Outcome: Ongoing, Progressing  Goal: Home and Community Transition Plan Established  Outcome: Ongoing, Progressing   Goal Outcome Evaluation:  Plan of Care Reviewed With: patient        Progress: improving  Outcome Evaluation: pt resting well throughout the night; pt had one occurance of loose watery diarhea throughout  Detail Level: Detailed Add 96454 Cpt? (Important Note: In 2017 The Use Of 40083 Is Being Tracked By Cms To Determine Future Global Period Reimbursement For Global Periods): yes the night; no acute distress noted; pt bath completed, pt was able to perform task with minimal assistance from staff; pt calm and cooperative

## 2022-05-04 NOTE — PROGRESS NOTES
[facility]  PROGRESS NOTE     Patient Identification:  Name:  Syed Grajeda  Age:  57 y.o.  Sex:  male  :  1965  MRN:  [id number]  Visit Number:  [id number]  ROOM: [facility]     Primary Care Provider:  Zheng Serrato MD    Length of stay in inpatient status:  9    Subjective     Chief Compliant:  No chief complaint on file.      History of Presenting Illness: 56-year-old gentleman with history of right AKA, prior history of left BKA, diabetes mellitus, ASCVD, sacral decubitus wound, opiate dependency, chronic pain disorder, neuropathy.  Patient did have loose stools last evening and had been on Augmentin approximately week prior.  Patient has no new complaints states that nausea is improved    Objective     Current Hospital Meds:aspirin, 81 mg, Oral, Daily  atorvastatin, 40 mg, Oral, Nightly  buprenorphine-naloxone, 2 tablet, Sublingual, Daily  famotidine, 20 mg, Oral, Daily  ferrous sulfate, 325 mg, Oral, BID With Meals  heparin (porcine), 5,000 Units, Subcutaneous, Q12H  metoprolol tartrate, 12.5 mg, Oral, Q12H  multivitamin with minerals, 1 tablet, Oral, Daily       ----------------------------------------------------------------------------------------------------------------------  Vital Signs:  Temp:  [97.9 °F (36.6 °C)-98.1 °F (36.7 °C)] 97.9 °F (36.6 °C)  Heart Rate:  [85-92] 90  Resp:  [16-20] 18  BP: (115-150)/(57-79) 117/73  SpO2:  [98 %-100 %] 100 %  on   ;   Device (Oxygen Therapy): room air  Body mass index is 46.39 kg/m².    Wt Readings from Last 3 Encounters:   22 74.8 kg (164 lb 15.5 oz)   19 81.6 kg (180 lb)     Intake & Output (last 3 days)        07 07 07 07 07 07 07 07    P.O. 1320 1080 1080     Total Intake(mL/kg) 1320 (17.6) 1080 (14.4) 1080 (14.4)     Urine (mL/kg/hr) 900 (0.5)  325 (0.2)     Total Output 900  325     Net +420 +1080 +755             Urine Unmeasured Occurrence 2 x 4 x  1 x     Stool Unmeasured Occurrence  1 x 1 x         Diet Soft Texture; Ground; Thin; Cardiac, Consistent Carbohydrate  ----------------------------------------------------------------------------------------------------------------------  Physical exam:  Constitutional:   Chronically ill-appearing gentleman no distress  HEENT: Normocephalic atraumatic  Neck: Supple   Cardiovascular: Regular rate and rhythm  Pulmonary/Chest: Clear to auscultation  Abdominal: Positive bowel sounds soft.   Musculoskeletal: Right AKA; left BKA  Neurological: No focal deficits  Skin: No rash  Peripheral vascular:  Genitourinary:  ----------------------------------------------------------------------------------------------------------------------    Last echocardiogram:    ----------------------------------------------------------------------------------------------------------------------  Results from last 7 days   Lab Units 05/03/22  1007 05/01/22  0105 04/30/22  0049   WBC 10*3/mm3 4.46 4.39 4.42   HEMOGLOBIN g/dL 8.1* 7.7* 7.3*   HEMATOCRIT % 26.9* 24.9* 24.0*   MCV fL 93.7 91.2 90.9   MCHC g/dL 30.1* 30.9* 30.4*   PLATELETS 10*3/mm3 605* 725* 675*         Results from last 7 days   Lab Units 05/03/22  1007 05/01/22  0105 04/30/22  0049 04/28/22  0042   SODIUM mmol/L 139 138 140 140   POTASSIUM mmol/L 3.9 3.8 3.8 3.8   CHLORIDE mmol/L 103 103 105 106   CO2 mmol/L 30.4* 29.0 27.3 29.1*   BUN mg/dL 10 9 8 7   CREATININE mg/dL 0.81 0.77 0.75* 0.84   CALCIUM mg/dL 7.8* 7.8* 7.5* 7.3*   GLUCOSE mg/dL 96 83 64* 84   ALBUMIN g/dL  --   --  1.57* 1.59*   BILIRUBIN mg/dL  --   --  0.2 0.2   ALK PHOS U/L  --   --  157* 151*   AST (SGOT) U/L  --   --  31 36   ALT (SGPT) U/L  --   --  26 32   Estimated Creatinine Clearance: 76.1 mL/min (by C-G formula based on SCr of 0.81 mg/dL).  No results found for: AMMONIA              Glucose   Date/Time Value Ref Range Status   05/04/2022 0610 76 70 - 130 mg/dL Final     Comment:     Meter: [id number]  : [id number] Luis Horner   05/03/2022 1955 87 70 - 130 mg/dL Final     Comment:     Meter: [id number] : [id number] Luis Horner   05/03/2022 1634 83 70 - 130 mg/dL Final     Comment:     Meter: [id number] : [id number] Gino Russo   05/03/2022 1138 99 70 - 130 mg/dL Final     Comment:     Meter: [id number] : [id number] Gino Russo   05/02/2022 2034 78 70 - 130 mg/dL Final     Comment:     Meter: [id number] : [id number] Luis Hart Siri   05/02/2022 1607 96 70 - 130 mg/dL Final     Comment:     Meter: [id number] : [id number] Jerman Tabor C   05/02/2022 1124 121 70 - 130 mg/dL Final     Comment:     Meter: [id number] : [id number] Gino Russo   05/02/2022 0609 75 70 - 130 mg/dL Final     Comment:     Meter: [id number] : [id number] Luis Horner     No results found for: TSH, FREET4  No results found for: PREGTESTUR, PREGSERUM, HCG, HCGQUANT  Pain Management Panel    There is no flowsheet data to display.       Brief Urine Lab Results     None        No results found for: BLOODCX      No results found for: URINECX  No results found for: WOUNDCX  No results found for: STOOLCX        I have personally looked at the labs and they are summarized above.  ----------------------------------------------------------------------------------------------------------------------  Detailed radiology reports for the last 24 hours:    Imaging Results (Last 24 Hours)     ** No results found for the last 24 hours. **        Final impressions for the last 30 days of radiology reports:    CT Abdomen Pelvis Without Contrast    Result Date: 4/28/2022   1. Consolidation in the right lung and small bilateral effusions, right greater than left.  2.Thickening of the urinary bladder wall.   3. Mild degree of anasarca.  4. Moderate to large volume stool.     This report was finalized on 4/28/2022 4:59 PM by Dr. Km Adams MD.      US Liver    Result Date: 4/27/2022  Homogeneous  echotexture of the liver.  This report was finalized on 4/27/2022 4:22 PM by Dr. Km Adams MD.      I have personally looked at the radiology images and read the final radiology report.    Assessment & Plan    Status post right AKA with remote history of left BKA--patient requiring minimum to moderate assistance for help with bed mobility; minimum to moderate assistance for transfers; continues to work on lower extremity strengthening exercises to improve ADLs and mobility.  Requires set up for grooming.    Diabetes mellitus not requiring meds at this point    Diarrhea--will check stool for C. difficile been on recent antibiotic course.  Will monitor but has had no further loose stools overnight.    Thrombocytosis improved    Anemia stable    Sacral decubitus wound local care    hypertension controlled    VTE Prophylaxis:   Mechanical Order History:     None      Pharmalogical Order History:      Ordered     Dose Route Frequency Stop    04/25/22 1529  heparin (porcine) 5000 UNIT/ML injection 5,000 Units         5,000 Units SC Every 12 Hours Scheduled --                    Manohar Piedra MD  [facility]  05/04/22  10:12 EDT

## 2022-05-04 NOTE — THERAPY TREATMENT NOTE
Inpatient Rehabilitation - Physical Therapy Treatment Note        [facility]     Patient Name: Syed Grajeda  : 1965  MRN: [id number]    Today's Date: 2022                    Admit Date: 2022      Visit Dx:     ICD-10-CM ICD-9-CM   1. S/P AKA (above knee amputation) unilateral, right (HCC)  Z89.611 V49.76       Patient Active Problem List   Diagnosis   • Cataract, nuclear sclerotic, right eye   • S/P AKA (above knee amputation) unilateral, right (HCC)       Past Medical History:   Diagnosis Date   • Arthritis    • Diabetes mellitus (CMS/HCC)    • GERD (gastroesophageal reflux disease)    • Hiatal hernia    • IBS (irritable bowel syndrome)        Past Surgical History:   Procedure Laterality Date   • APPENDECTOMY     • BELOW KNEE AMPUTATION Left    • CATARACT EXTRACTION W/ INTRAOCULAR LENS IMPLANT Right 2019    Procedure: CATARACT PHACO EXTRACTION WITH INTRAOCULAR LENS IMPLANT;  Surgeon: Tan Gonzalez MD;  Location: [facility];  Service: Ophthalmology   • TOE AMPUTATION Right     right small toe       PT ASSESSMENT (last 12 hours)     IRF PT Evaluation and Treatment     Row Name 22 1106          PT Time and Intention    Document Type daily treatment  -RG     Mode of Treatment individual therapy;physical therapy  -RG     Patient/Family/Caregiver Comments/Observations Pt is currently in isolation and was seen in his room.  -RG     Row Name 22 1106          General Information    Patient Profile Reviewed yes  -RG     Existing Precautions/Restrictions fall  <skin integrity, BLE amputations  -RG     Row Name 22 1106          Pain Scale: FACES Pre/Post-Treatment    Pain: FACES Scale, Pretreatment 4-->hurts little more  -RG     Posttreatment Pain Rating 6-->hurts even more  -RG     Row Name 22 1106          Cognition/Psychosocial    Affect/Mental Status (Cognition) WFL  -RG     Follows Commands (Cognition) verbal cues/prompting required  -RG     Personal Safety  Interventions gait belt;nonskid shoes/slippers when out of bed;fall prevention program maintained  -     Row Name 05/04/22 1106          Bed Mobility    Supine-Sit [place] (Bed Mobility) minimum assist (75% patient effort);moderate assist (50% patient effort)  -RG     Sit-Supine [place] (Bed Mobility) minimum assist (75% patient effort);moderate assist (50% patient effort)  -     Row Name 05/04/22 1106          Safety Issues, Functional Mobility    Impairments Affecting Function (Mobility) balance;endurance/activity tolerance;pain;postural/trunk control;range of motion (ROM);strength  -     Row Name 05/04/22 1106          Hip (Therapeutic Exercise)    Hip PROM (Therapeutic Exercise) right;extension;supine;10 second hold;10 repetitions  -RG     Hip Strengthening (Therapeutic Exercise) bilateral;flexion;aBduction;aDduction;external rotation;internal rotation;supine;sidelying;10 repetitions;2 sets  hip abd and ext in sidelying  -     Row Name 05/04/22 1106          Knee (Therapeutic Exercise)    Knee PROM (Therapeutic Exercise) left;extension;sidelying;supine;10 repetitions  -RG     Knee Strengthening (Therapeutic Exercise) left;SAQ (short arc quad);supine;10 repetitions;2 sets  -     Row Name 05/04/22 1106          Positioning and Restraints    Pre-Treatment Position in bed  -RG     Post Treatment Position bed  -RG     In Bed notified nsg;supine;call light within reach;encouraged to call for assist  -     Row Name 05/04/22 1106          Therapy Assessment/Plan (PT)    Patient's Goals For Discharge return home  -[facility] Name 05/04/22 1106          Therapy Assessment/Plan (PT)    Rehab Potential/Prognosis (PT) adequate, monitor progress closely  -RG     Frequency of Treatment (PT) 5 times per week  -RG     Estimated Duration of Therapy (PT) 3 weeks  -RG     Problem List (PT) balance;mobility;range of motion (ROM);strength;pain;postural control  -RG     Activity Limitations Related to Problem  List (PT) unable to ambulate safely;unable to transfer safely  -     Row Name 05/04/22 1106          Daily Progress Summary (PT)    Impairments Still Limiting Function (PT) flexibility decreased;functional activity tolerance impairment;pain;strength deficit  -     Row Name 05/04/22 1106          Therapy Plan Review/Discharge Plan (PT)    Anticipated Equipment Needs at Discharge (PT Eval) --  tbd  -RG     Anticipated Discharge Disposition (PT) home with home health  -     Row Name 05/04/22 1106          IRF PT Goals    Bed Mobility Goal Selection (PT-IRF) bed mobility, PT goal 1;bed mobility, PT goal 2  -RG     Transfer Goal Selection (PT-IRF) transfers, PT goal 1;transfers, PT goal 2  -RG     Balance Goal Selection (PT) balance, PT goal 1;balance, PT goal 2  -     Row Name 05/04/22 1106          Bed Mobility Goal 1 (PT-IRF)    Activity/Assistive Device (Bed Mobility Goal 1, PT-IRF) sit to supine/supine to sit  -RG     [place] Level (Bed Mobility Goal 1, PT-IRF) minimum assist (75% or more patient effort)  -RG     Time Frame (Bed Mobility Goal 1, PT-IRF) short-term goal (STG);1 week  -RG     Progress/Outcomes (Bed Mobility Goal 1, PT-IRF) goal ongoing  -     Row Name 05/04/22 1106          Bed Mobility Goal 2 (PT-IRF)    Activity/Assistive Device (Bed Mobility Goal 2, PT-IRF) sit to supine/supine to sit  -RG     [place] Level (Bed Mobility Goal 2, PT-IRF) modified independence  -RG     Time Frame (Bed Mobility Goal 2, PT-IRF) by discharge  -RG     Progress/Outcomes (Bed Mobility Goal 2, PT-IRF) goal ongoing  -     Row Name 05/04/22 1106          Transfer Goal 1 (PT-IRF)    Activity/Assistive Device (Transfer Goal 1, PT-IRF) bed-to-chair/chair-to-bed  -RG     [place] Level (Transfer Goal 1, PT-IRF) moderate assist (50-74% patient effort)  with sliding board  -RG     Time Frame (Transfer Goal 1, PT-IRF) short-term goal (STG);1 week  -RG     Progress/Outcomes (Transfer Goal 1, PT-IRF) goal  ongoing  -RG     Row Name 05/04/22 1106          Transfer Goal 2 (PT-IRF)    Activity/Assistive Device (Transfer Goal 2, PT-IRF) bed-to-chair/chair-to-bed  -RG     [place] Level (Transfer Goal 2, PT-IRF) modified independence  with sliding board  -RG     Time Frame (Transfer Goal 2, PT-IRF) by discharge  -RG     Progress/Outcomes (Transfer Goal 2, PT-IRF) goal ongoing  -RG     Row Name 05/04/22 1106          Balance Goal 1 (PT)    Activity/Assistive Device (Balance Goal 1, PT) sitting, static  -RG     [place] Level/Cues Needed (Balance Goal 1, PT) contact guard assist  -RG     Time Frame (Balance Goal 1, PT) short term goal (STG);1 week  -RG     Progress/Outcomes (Balance Goal 1, PT) goal ongoing  -RG     Row Name 05/04/22 1106          Balance Goal 2 (PT)    Activity/Assistive Device (Balance Goal 2, PT) sitting, dynamic  -RG     [place] Level (Balance Goal 2, PT) conditional independence  -RG     Time Frame (Balance Goal 2, PT) by discharge  -RG     Progress/Outcome (Balance Goal 2, PT) goal ongoing  -RG           User Key  (r) = Recorded By, (t) = Taken By, (c) = Cosigned By    Initials Name Provider Type    RG Adam Goodman PTA Physical Therapist Assistant              Wound 04/25/22 1525 Right lower gluteal Pressure Injury (Active)   Pressure Injury Stage 1 05/03/22 2020   Dressing Appearance dry;intact 05/04/22 0830   Closure None 05/04/22 0830   Base red;non-blanchable 05/04/22 0830   Periwound excoriated;redness;pink 05/04/22 0830   Periwound Temperature warm 05/03/22 2020   Periwound Skin Turgor soft 05/03/22 2020   Edges irregular 05/03/22 2020   Care, Wound cleansed with;sterile normal saline;honey applied;pressure removed 05/04/22 0830   Dressing Care dressing changed 05/04/22 0830       Wound 04/25/22 1525 Right upper gluteal Pressure Injury (Active)   Pressure Injury Stage 2 05/03/22 2020   Dressing Appearance dry;intact 05/04/22 0830   Closure None 05/04/22 0830   Base  red;yellow;non-blanchable 05/04/22 0830   Periwound pink;redness 05/04/22 0830   Periwound Temperature warm 05/03/22 2020   Periwound Skin Turgor soft 05/03/22 2020   Drainage Amount none 05/04/22 0830   Care, Wound cleansed with;sterile normal saline;honey applied;pressure removed 05/04/22 0830   Dressing Care dressing changed 05/04/22 0830       Wound 04/25/22 1525 Bilateral medial sacral spine Pressure Injury (Active)   Pressure Injury Stage 2 05/03/22 2020   Dressing Appearance dry;intact 05/04/22 0830   Closure None 05/04/22 0830   Base red;non-blanchable 05/04/22 0830   Periwound redness 05/04/22 0830   Periwound Temperature warm 05/03/22 2020   Periwound Skin Turgor soft 05/03/22 2020   Edges irregular 05/03/22 2020   Drainage Amount none 05/03/22 2020   Care, Wound cleansed with;sterile normal saline;honey applied;pressure removed 05/04/22 0830   Dressing Care dressing changed 05/04/22 0830       Wound 04/25/22 1525 Left medial gluteal Pressure Injury (Active)   Pressure Injury Stage 2 05/03/22 2020   Dressing Appearance dry;intact 05/04/22 0830   Closure None 05/04/22 0830   Base pink;red 05/04/22 0830   Periwound redness 05/04/22 0830   Periwound Temperature warm 05/03/22 2020   Periwound Skin Turgor soft 05/03/22 2020   Edges irregular 05/03/22 2020   Drainage Amount none 05/04/22 0830   Care, Wound cleansed with;sterile normal saline;honey applied;pressure removed 05/04/22 0830   Dressing Care dressing changed 05/04/22 0830       Wound 04/25/22 1255 Left lateral gluteal Pressure Injury (Active)   Pressure Injury Stage 1 05/03/22 2020   Dressing Appearance dry;intact 05/04/22 0830   Closure None 05/04/22 0830   Base non-blanchable;red 05/04/22 0830   Periwound redness 05/04/22 0830   Periwound Temperature warm 05/03/22 2020   Periwound Skin Turgor soft 05/03/22 2020   Edges irregular 05/03/22 2020   Drainage Amount none 05/04/22 0830   Care, Wound cleansed with;sterile normal saline;honey applied;pressure  removed 05/04/22 0830   Dressing Care dressing changed 05/04/22 0830       Wound 04/25/22 1525 Left knee Amputation stump (Active)   Dressing Appearance open to air 05/04/22 0830   Base clean;dry 05/04/22 0830   Dressing Care open to air 05/04/22 0830       Wound 04/25/22 1525 Right knee Amputation stump (Active)   Dressing Appearance dry;intact 05/04/22 0830   Closure Approximated;Staples 05/03/22 2020   Base dressing in place, unable to visualize 05/04/22 0830   Periwound intact 05/03/22 2020   Drainage Amount none 05/04/22 0830   Care, Wound cleansed with;sterile normal saline;dressing removed 05/03/22 2020   Dressing Care dressing applied;dressing changed;gauze, dry;elastic bandage 05/03/22 2020     Physical Therapy Education                 Title: PT OT SLP Therapies (Done)     Topic: Physical Therapy (Done)     Point: Mobility training (Done)     Learning Progress Summary           Patient Acceptance, E,D, VU,NR by  at 5/4/2022 1114      Show all documentation for this point (8)                 Point: Home exercise program (Done)     Learning Progress Summary           Patient Acceptance, E,D, VU,NR by  at 5/4/2022 1114      Show all documentation for this point (8)                 Point: Body mechanics (Done)     Learning Progress Summary           Patient Acceptance, E,D, VU,NR by  at 5/4/2022 1114      Show all documentation for this point (8)                 Point: Precautions (Done)     Learning Progress Summary           Patient Acceptance, E,D, VU,NR by  at 5/4/2022 1114      Show all documentation for this point (8)                             User Key     Initials Effective Dates Name Provider Type Discipline     06/16/21 -  Adam Goodman PTA Physical Therapist Assistant PT                PT Recommendation and Plan    Frequency of Treatment (PT): 5 times per week  Anticipated Equipment Needs at Discharge (PT Eval):  (tbd)  Daily Progress Summary (PT)  Impairments Still Limiting Function  (PT): flexibility decreased, functional activity tolerance impairment, pain, strength deficit               Time Calculation:      PT Charges     Row Name 05/04/22 1114             Time Calculation    Start Time 0745  -RG      Stop Time 0915  -RG      Time Calculation (min) 90 min  -RG      PT Received On 05/04/22  -RG              Time Calculation- PT    Total Timed Code Minutes- PT 90 minute(s)  -RG            User Key  (r) = Recorded By, (t) = Taken By, (c) = Cosigned By    Initials Name Provider Type     Adam Goodman PTA Physical Therapist Assistant                Therapy Charges for Today     Code Description Service Date Service Provider Modifiers Qty    03523086858  PT THERAPEUTIC ACT EA 15 MIN 5/4/2022 Aadm Goodman PTA GP, CQ 2    38441232949 HC PT THER PROC EA 15 MIN 5/4/2022 Adam Goodman PTA GP, CQ 4                   Adam Goodman PTA  5/4/2022

## 2022-05-04 NOTE — PROGRESS NOTES
Case Management  Inpatient Rehabilitation Team Conference    Conference Date/Time: 5/3/2022 5:36:31 AM    Team Conference Attendees:  MD Janine Garcia SW Jessica Bill, RN,   NORMAN Bhatt, PT  Kary Gutierrez OT    Demographics            Age: 57Y            Gender: Male    Admission Date: 4/25/2022 3:25:00 PM  Rehabilitation Diagnosis:  Right AKA  Comorbidities: A41.9 Sepsis, unspecified organism  J69.0 Pneumonitis due to inhalation of food and vomit  M86.9 Osteomyelitis, unspecified  E11.69 Type 2 diabetes mellitus with other specified complication  Z87.891 Personal history of nicotine dependence  Z79.4 Long term (current) use of insulin  L89.159 Pressure ulcer of sacral region, unspecified stage  Z89.512 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  Medical Necessity Expected Level Rationale: Fair-Good  Intensity and Duration: an average of 3 hours/5 days per week  Medical Supervision and 24 Hour Rehab Nursing: x  Physical Therapy: x  PT Intensity/Duration: PT 1.5 hours per day/5 days per week  Occupational Therapy: x  OT Intensity/Duration: OT 1.5 hours per day/5 days per week  Social Work: x  Therapeutic Recreation: x  Updated (if changes indicated)    Anticipated Discharge Date/Estimated Length of Stay:   5-13-22      Discharge Plan of Care:    Based on the patient's medical and functional status, their prognosis and  expected level of functional improvement is: Fair      Interdisciplinary Problem/Goals/Status  Body Function Structure    [RN] Skin  Integrity(Active)  Current Status(04/25/2022): Impaired skin integrity  Weekly Goal(05/02/2022): No more skin breakdown/improvement of skin integrity  Discharge Goal: Improved skin integrity    [PT] Balance(Active)  Current Status(04/26/2022): NA  Weekly Goal(05/03/2022): static sitting balance CGA  Discharge Goal: static sitting balance MI        Mobility    [PT] Bed/Chair/Wheelchair(Active)  Current Status(04/26/2022): kya bed-w/c  Weekly Goal(05/03/2022): mod A with SB  Discharge Goal: MI with SB    [PT] Bed Mobility(Active)  Current Status(04/26/2022): max A  Weekly Goal(05/03/2022): min A  Discharge Goal: MI        Pain    [RN] Pain Management(Active)  Current Status(04/25/2022): Potential for increased pain  Weekly Goal(05/02/2022): Tolerable pain level  Discharge Goal: No pain        Safety    [RN] Potential for Injury(Active)  Current Status(04/25/2022): Potential for falls  Weekly Goal(05/02/2022): No falls  Discharge Goal: No falls        Self Care    [OT] Dressing (Upper)(Resolved)  Current Status(05/02/2022): setup  Weekly Goal(05/03/2022): Amy  Discharge Goal: setup    [OT] Dressing (Lower)(Active)  Current Status(05/02/2022): maxA  Weekly Goal(05/10/2022): modA  Discharge Goal: Amy    Comments: Pt plans to return to his apartment at discharge with spouse assisting  with caregiving needs.    Signed by: RAMIN Rojas    Physician CoSigned By: Manohar Piedra 05/04/2022 09:03:07

## 2022-05-04 NOTE — PROGRESS NOTES
Rehabilitation Nursing  Inpatient Rehabilitation Plan of Care Note    Plan of Care  Pain    Pain Management (Active)  Current Status (4/25/2022 3:25:00 PM): Potential for increased pain  Weekly Goal: Tolerable pain level  Discharge Goal: No pain    Body Function Structure    Skin Integrity (Active)  Current Status (4/25/2022 3:25:00 PM): Impaired skin integrity  Weekly Goal: No more skin breakdown/improvement of skin integrity  Discharge Goal: Improved skin integrity    Safety    Potential for Injury (Active)  Current Status (4/25/2022 3:25:00 PM): Potential for falls  Weekly Goal: No falls  Discharge Goal: No falls    Signed by: Jamar Walters RN

## 2022-05-05 LAB
027 TOXIN: NORMAL
C DIFF TOX GENS STL QL NAA+PROBE: NEGATIVE
GLUCOSE BLDC GLUCOMTR-MCNC: 78 MG/DL (ref 70–130)
GLUCOSE BLDC GLUCOMTR-MCNC: 88 MG/DL (ref 70–130)

## 2022-05-05 PROCEDURE — 25010000002 HEPARIN (PORCINE) PER 1000 UNITS: Performed by: INTERNAL MEDICINE

## 2022-05-05 PROCEDURE — 97535 SELF CARE MNGMENT TRAINING: CPT

## 2022-05-05 PROCEDURE — 97530 THERAPEUTIC ACTIVITIES: CPT

## 2022-05-05 PROCEDURE — 97110 THERAPEUTIC EXERCISES: CPT

## 2022-05-05 PROCEDURE — 82962 GLUCOSE BLOOD TEST: CPT

## 2022-05-05 PROCEDURE — 97112 NEUROMUSCULAR REEDUCATION: CPT

## 2022-05-05 PROCEDURE — 99231 SBSQ HOSP IP/OBS SF/LOW 25: CPT | Performed by: FAMILY MEDICINE

## 2022-05-05 PROCEDURE — 63710000001 ONDANSETRON PER 8 MG: Performed by: INTERNAL MEDICINE

## 2022-05-05 RX ADMIN — FERROUS SULFATE TAB 325 MG (65 MG ELEMENTAL FE) 325 MG: 325 (65 FE) TAB at 08:57

## 2022-05-05 RX ADMIN — ASPIRIN 81 MG: 81 TABLET, CHEWABLE ORAL at 08:57

## 2022-05-05 RX ADMIN — METOPROLOL TARTRATE 12.5 MG: 25 TABLET ORAL at 21:09

## 2022-05-05 RX ADMIN — HYDROXYZINE HYDROCHLORIDE 25 MG: 25 TABLET ORAL at 21:09

## 2022-05-05 RX ADMIN — HEPARIN SODIUM 5000 UNITS: 5000 INJECTION INTRAVENOUS; SUBCUTANEOUS at 21:10

## 2022-05-05 RX ADMIN — Medication 1 TABLET: at 08:57

## 2022-05-05 RX ADMIN — FAMOTIDINE 20 MG: 20 TABLET, FILM COATED ORAL at 08:57

## 2022-05-05 RX ADMIN — HEPARIN SODIUM 5000 UNITS: 5000 INJECTION INTRAVENOUS; SUBCUTANEOUS at 08:56

## 2022-05-05 RX ADMIN — METHOCARBAMOL 500 MG: 500 TABLET, FILM COATED ORAL at 21:09

## 2022-05-05 RX ADMIN — FERROUS SULFATE TAB 325 MG (65 MG ELEMENTAL FE) 325 MG: 325 (65 FE) TAB at 17:05

## 2022-05-05 RX ADMIN — ATORVASTATIN CALCIUM 40 MG: 40 TABLET, FILM COATED ORAL at 21:09

## 2022-05-05 RX ADMIN — ONDANSETRON HYDROCHLORIDE 4 MG: 4 TABLET, FILM COATED ORAL at 09:45

## 2022-05-05 RX ADMIN — BUPRENORPHINE HYDROCHLORIDE AND NALOXONE HYDROCHLORIDE DIHYDRATE 2 TABLET: 8; 2 TABLET SUBLINGUAL at 08:57

## 2022-05-05 RX ADMIN — METOPROLOL TARTRATE 12.5 MG: 25 TABLET ORAL at 08:57

## 2022-05-05 NOTE — THERAPY TREATMENT NOTE
Inpatient Rehabilitation - Occupational Therapy Treatment Note    [facility]     Patient Name: Syed Grajeda  : 1965  MRN: [id number]    Today's Date: 2022                 Admit Date: 2022         ICD-10-CM ICD-9-CM   1. S/P AKA (above knee amputation) unilateral, right (HCC)  Z89.611 V49.76       Patient Active Problem List   Diagnosis   • Cataract, nuclear sclerotic, right eye   • S/P AKA (above knee amputation) unilateral, right (HCC)       Past Medical History:   Diagnosis Date   • Arthritis    • Diabetes mellitus (CMS/HCC)    • GERD (gastroesophageal reflux disease)    • Hiatal hernia    • IBS (irritable bowel syndrome)        Past Surgical History:   Procedure Laterality Date   • APPENDECTOMY     • BELOW KNEE AMPUTATION Left    • CATARACT EXTRACTION W/ INTRAOCULAR LENS IMPLANT Right 2019    Procedure: CATARACT PHACO EXTRACTION WITH INTRAOCULAR LENS IMPLANT;  Surgeon: Tan Gonzalez MD;  Location: [facility];  Service: Ophthalmology   • TOE AMPUTATION Right     right small toe             IRF OT ASSESSMENT FLOWSHEET (last 12 hours)     IRF OT Evaluation and Treatment     Row Name 22 0829          OT Time and Intention    Document Type daily treatment  -TM     Mode of Treatment occupational therapy  -TM     Patient Effort good  -TM     Symptoms Noted During/After Treatment none  -TM     Row Name 22 0829          General Information    General Observations of Patient Pt agreeable for therapy.  -TM     Existing Precautions/Restrictions fall;non-weight bearing;other (see comments)  NWB RLE, decreased skin integrity  -TM     Row Name 22 0829          Cognition/Psychosocial    Orientation Status (Cognition) oriented to;person;place;situation  -TM     Follows Commands (Cognition) follows one-step commands;verbal cues/prompting required  -TM     Row Name 22 0829          Lower Body Dressing    [place] Level (Lower Body Dressing) minimum assist (75% patient  effort);verbal cues  -TM     Position (Lower Body Dressing) supine  -TM     Row Name 05/05/22 0829          Grooming    [place] Level (Grooming) set up  -TM     Position (Grooming) supported sitting  -TM     Row Name 05/05/22 0829          Transfers    Bed-Chair [place] (Transfers) minimum assist (75% patient effort);2 person assist;verbal cues  -TM     Assistive Device (Bed-Chair Transfers) wheelchair;transfer board  -TM     Row Name 05/05/22 0829          Motor Skills    Motor Skills coordination;functional endurance;motor control/coordination interventions  -     Motor Control/Coordination Interventions therapeutic exercise/ROM;gross motor coordination activities;fine motor manipulation/dexterity activities;other (see comments)  BUE ther ex/act, GMC/FMC, bilateral coord  -           User Key  (r) = Recorded By, (t) = Taken By, (c) = Cosigned By    Initials Name Effective Dates     Jenny French OT 06/16/21 -                  Occupational Therapy Education                 Title: PT OT SLP Therapies (Done)     Topic: Occupational Therapy (Done)     Point: ADL training (Done)     Description:   Instruct learner(s) on proper safety adaptation and remediation techniques during self care or transfers.   Instruct in proper use of assistive devices.              Learning Progress Summary           Patient Acceptance, E,D, VU,NR by  at 5/5/2022 0827      Show all documentation for this point (9)                 Point: Precautions (Done)     Description:   Instruct learner(s) on prescribed precautions during self-care and functional transfers.              Learning Progress Summary           Patient Acceptance, E,D, VU,NR by  at 5/5/2022 0827      Show all documentation for this point (9)                             User Key     Initials Effective Dates Name Provider Type Discipline     06/16/21 -  Jenny French OT Occupational Therapist OT                    OT Recommendation and  Plan    Planned Therapy Interventions (OT): activity tolerance training, adaptive equipment training, BADL retraining, IADL retraining, occupation/activity based interventions, patient/caregiver education/training, ROM/therapeutic exercise, strengthening exercise, transfer/mobility retraining                    Time Calculation:      Time Calculation- OT     Row Name 05/05/22 1427 05/05/22 0827          Time Calculation- OT    OT Start Time 1340  -TM 0800  -TM     OT Stop Time 1355  -TM 0915  -TM     OT Time Calculation (min) 15 min  -TM 75 min  -TM     Total Timed Code Minutes- OT 15 minute(s)  -TM 75 minute(s)  -TM     OT Non-Billable Time (min) -- 15 min  -TM           User Key  (r) = Recorded By, (t) = Taken By, (c) = Cosigned By    Initials Name Provider Type    TM Jenny French OT Occupational Therapist              Therapy Charges for Today     Code Description Service Date Service Provider Modifiers Qty    47547606960 HC OT SELF CARE/MGMT/TRAIN EA 15 MIN 5/4/2022 Jenny French, OT GO 3    16429983953 HC OT THERAPEUTIC ACT EA 15 MIN 5/4/2022 Jenny French OT GO 3    99862989096 HC OT SELF CARE/MGMT/TRAIN EA 15 MIN 5/5/2022 Jenny French, OT GO 2    79873960338 HC OT THERAPEUTIC ACT EA 15 MIN 5/5/2022 Jenny French OT GO 2    61930544539 HC OT THER PROC EA 15 MIN 5/5/2022 Jenny French OT GO 1    15655503690 HC OT THERAPEUTIC ACT EA 15 MIN 5/5/2022 Jenny French OT GO 1                   Jenny French OT  5/5/2022

## 2022-05-05 NOTE — SIGNIFICANT NOTE
05/05/22 0945   Plan   Plan Pt's spouse and daughter did not come for education.  Spoke to pt who was hoping his family would be here today.  Will follow.

## 2022-05-05 NOTE — PROGRESS NOTES
[facility]  PROGRESS NOTE     Patient Identification:  Name:  Syed Grajeda  Age:  57 y.o.  Sex:  male  :  1965  MRN:  [id number]  Visit Number:  [id number]  ROOM: [facility]     Primary Care Provider:  Zheng Serrato MD    Length of stay in inpatient status:  10    Subjective     Chief Compliant:  No chief complaint on file.      History of Presenting Illness: 56-year-old gentleman with a history of right AKA, prior history of left BKA, diabetes mellitus, ASCVD, sacral decubitus wound, opiate dependency, chronic pain disorder and neuropathy.  Patient states he is doing better.  Stools were negative for C. difficile.  Has no new complaints at this time and is making some progress with PT and OT.    Objective     Current Hospital Meds:aspirin, 81 mg, Oral, Daily  atorvastatin, 40 mg, Oral, Nightly  buprenorphine-naloxone, 2 tablet, Sublingual, Daily  famotidine, 20 mg, Oral, Daily  ferrous sulfate, 325 mg, Oral, BID With Meals  heparin (porcine), 5,000 Units, Subcutaneous, Q12H  metoprolol tartrate, 12.5 mg, Oral, Q12H  multivitamin with minerals, 1 tablet, Oral, Daily       ----------------------------------------------------------------------------------------------------------------------  Vital Signs:  Temp:  [98.2 °F (36.8 °C)] 98.2 °F (36.8 °C)  Heart Rate:  [91] 91  Resp:  [18] 18  BP: (116)/(57) 116/57  SpO2:  [97 %] 97 %  on   ;   Device (Oxygen Therapy): room air  Body mass index is 46.39 kg/m².    Wt Readings from Last 3 Encounters:   22 74.8 kg (164 lb 15.5 oz)   19 81.6 kg (180 lb)     Intake & Output (last 3 days)        0701  05/03 0700 05 0701   0700  0701   0700 05 0701   0700    P.O. 1080 1080 1680     Total Intake(mL/kg) 1080 (14.4) 1080 (14.4) 1680 (22.5)     Urine (mL/kg/hr)  325 (0.2)      Total Output  325      Net +1080 +755 +1680             Urine Unmeasured Occurrence 4 x 1 x 6 x     Stool Unmeasured Occurrence 1 x 1 x  1 x         Diet Soft Texture; Ground; Thin; Cardiac, Consistent Carbohydrate  ----------------------------------------------------------------------------------------------------------------------  Physical exam:  Constitutional:   Frail appearing chronically ill-appearing male in no distress  HEENT: Normocephalic atraumatic  Neck:    Supple  Cardiovascular: Regular rate and rhythm  Pulmonary/Chest: Clear to auscultation  Abdominal: Positive bowel sounds soft.   Musculoskeletal: Patient has muscle wasting in the hands.  Status post right AKA and left BKA.  Neurological: Some generalized weakness but no focal deficits otherwise.  Has some stocking glove sensory changes.  Skin: Right AKA is dressed.  Peripheral vascular:  Genitourinary:  ----------------------------------------------------------------------------------------------------------------------    Last echocardiogram:    ----------------------------------------------------------------------------------------------------------------------  Results from last 7 days   Lab Units 05/03/22  1007 05/01/22  0105 04/30/22  0049   WBC 10*3/mm3 4.46 4.39 4.42   HEMOGLOBIN g/dL 8.1* 7.7* 7.3*   HEMATOCRIT % 26.9* 24.9* 24.0*   MCV fL 93.7 91.2 90.9   MCHC g/dL 30.1* 30.9* 30.4*   PLATELETS 10*3/mm3 605* 725* 675*         Results from last 7 days   Lab Units 05/03/22  1007 05/01/22  0105 04/30/22  0049   SODIUM mmol/L 139 138 140   POTASSIUM mmol/L 3.9 3.8 3.8   CHLORIDE mmol/L 103 103 105   CO2 mmol/L 30.4* 29.0 27.3   BUN mg/dL 10 9 8   CREATININE mg/dL 0.81 0.77 0.75*   CALCIUM mg/dL 7.8* 7.8* 7.5*   GLUCOSE mg/dL 96 83 64*   ALBUMIN g/dL  --   --  1.57*   BILIRUBIN mg/dL  --   --  0.2   ALK PHOS U/L  --   --  157*   AST (SGOT) U/L  --   --  31   ALT (SGPT) U/L  --   --  26   Estimated Creatinine Clearance: 76.1 mL/min (by C-G formula based on SCr of 0.81 mg/dL).  No results found for: AMMONIA              Glucose   Date/Time Value Ref Range Status   05/05/2022 0549  78 70 - 130 mg/dL Final     Comment:     Meter: [id number] : [id number] Marlee Crystal   05/04/2022 1954 136 (H) 70 - 130 mg/dL Final     Comment:     Meter: [id number] : [id number] [place] Crystal   05/04/2022 1616 79 70 - 130 mg/dL Final     Comment:     Meter: [id number] : [id number] Christian Rafaela   05/04/2022 1122 98 70 - 130 mg/dL Final     Comment:     Meter: [id number] : [id number] Jerman Tabor C   05/04/2022 0610 76 70 - 130 mg/dL Final     Comment:     Meter: [id number] : [id number] Luis Vilata Siri   05/03/2022 1955 87 70 - 130 mg/dL Final     Comment:     Meter: [id number] : [id number] Luis Burdickanita Siri   05/03/2022 1634 83 70 - 130 mg/dL Final     Comment:     Meter: [id number] : [id number] Christian Rafaela   05/03/2022 1138 99 70 - 130 mg/dL Final     Comment:     Meter: [id number] : [id number] Gino Rafaela     No results found for: TSH, FREET4  No results found for: PREGTESTUR, PREGSERUM, HCG, HCGQUANT  Pain Management Panel    There is no flowsheet data to display.       Brief Urine Lab Results     None        No results found for: BLOODCX      No results found for: URINECX  No results found for: WOUNDCX  No results found for: STOOLCX        I have personally looked at the labs and they are summarized above.  ----------------------------------------------------------------------------------------------------------------------  Detailed radiology reports for the last 24 hours:    Imaging Results (Last 24 Hours)     ** No results found for the last 24 hours. **        Final impressions for the last 30 days of radiology reports:    CT Abdomen Pelvis Without Contrast    Result Date: 4/28/2022   1. Consolidation in the right lung and small bilateral effusions, right greater than left.  2.Thickening of the urinary bladder wall.   3. Mild degree of anasarca.  4. Moderate to large volume stool.     This report was finalized on 4/28/2022 4:59 PM by Dr. Km Adams MD.       US Liver    Result Date: 4/27/2022  Homogeneous echotexture of the liver.  This report was finalized on 4/27/2022 4:22 PM by Dr. Km Adams MD.      I have personally looked at the radiology images and read the final radiology report.    Assessment & Plan    Status post right AKA with remote history of left BKA--patient requiring minimum assist for up with bathing; set up for upper body dressing; minimum assistance for lower body dressing; set up for grooming; moderate to maximum assistance for up with toileting.  Patient does require minimum to moderate assistance for help with bed mobility; minimum to moderate assist for up with transfers.  Continues work on motor skills to improve ADLs and functional mobility.    Diabetes mellitus not requiring meds.  Well-controlled    Diarrhea--have ruled out C. difficile.  Currently doing reasonably well.  Will monitor closely    Thrombocytosis    Anemia stable    Sacral decubitus wound continue local care    Hypertension controlled    VTE Prophylaxis:   Mechanical Order History:     None      Pharmalogical Order History:      Ordered     Dose Route Frequency Stop    04/25/22 1529  heparin (porcine) 5000 UNIT/ML injection 5,000 Units         5,000 Units SC Every 12 Hours Scheduled --                    Manohar Piedra MD  [facility]  05/05/22  09:20 EDT

## 2022-05-05 NOTE — THERAPY TREATMENT NOTE
Inpatient Rehabilitation - Physical Therapy Treatment Note        Jake     Patient Name: Syed Grajeda  : 1965  MRN: [id number]    Today's Date: 2022                    Admit Date: 2022      Visit Dx:     ICD-10-CM ICD-9-CM   1. S/P AKA (above knee amputation) unilateral, right (HCC)  Z89.611 V49.76       Patient Active Problem List   Diagnosis   • Cataract, nuclear sclerotic, right eye   • S/P AKA (above knee amputation) unilateral, right (HCC)       Past Medical History:   Diagnosis Date   • Arthritis    • Diabetes mellitus (CMS/HCC)    • GERD (gastroesophageal reflux disease)    • Hiatal hernia    • IBS (irritable bowel syndrome)        Past Surgical History:   Procedure Laterality Date   • APPENDECTOMY     • BELOW KNEE AMPUTATION Left    • CATARACT EXTRACTION W/ INTRAOCULAR LENS IMPLANT Right 2019    Procedure: CATARACT PHACO EXTRACTION WITH INTRAOCULAR LENS IMPLANT;  Surgeon: Tan Gonzalez MD;  Location: [facility];  Service: Ophthalmology   • TOE AMPUTATION Right     right small toe       PT ASSESSMENT (last 12 hours)     IRF PT Evaluation and Treatment     Row Name 22 1435          PT Time and Intention    Document Type daily treatment  -RG     Mode of Treatment individual therapy;physical therapy  -RG     Patient/Family/Caregiver Comments/Observations Pt was out of isolation today.  Pt and nursing in agreement for skilled PT on this date.  -RG     Row Name 22 1435          General Information    Patient Profile Reviewed yes  -RG     Existing Precautions/Restrictions fall  <skin integrity, BLE amputations  -RG     Row Name 22 1435          Pain Scale: FACES Pre/Post-Treatment    Pain: FACES Scale, Pretreatment 4-->hurts little more  -RG     Posttreatment Pain Rating 6-->hurts even more  -RG     Row Name 22 1435          Cognition/Psychosocial    Affect/Mental Status (Cognition) WFL  -RG     Follows Commands (Cognition) verbal cues/prompting required   -     Personal Safety Interventions gait belt;nonskid shoes/slippers when out of bed;fall prevention program maintained  -     Row Name 05/05/22 1435          Bed Mobility    Supine-Sit [place] (Bed Mobility) minimum assist (75% patient effort);moderate assist (50% patient effort)  -     Sit-Supine [place] (Bed Mobility) minimum assist (75% patient effort);moderate assist (50% patient effort)  -     Row Name 05/05/22 1435          Transfers    Bed-Chair [place] (Transfers) moderate assist (50% patient effort)  -RG     Chair-Bed [place] (Transfers) moderate assist (50% patient effort)  -     Assistive Device (Bed-Chair Transfers) wheelchair;transfer board  -     Row Name 05/05/22 1435          Bed-Chair Transfer    Comment, (Bed-Chair Transfer) mat table to chair.  -     Row Name 05/05/22 1435          Chair-Bed Transfer    Assistive Device (Chair-Bed Transfers) transfer board  -     Comment, (Chair-Bed Transfer) chair to mat  -     Row Name 05/05/22 1435          Safety Issues, Functional Mobility    Impairments Affecting Function (Mobility) balance;endurance/activity tolerance;pain;postural/trunk control;range of motion (ROM);strength  -     Row Name 05/05/22 1435          Balance    Static Sitting Balance supervision  -     Dynamic Sitting Balance contact guard  -     Position, Sitting Balance sitting edge of mat  -     Row Name 05/05/22 1435          Hip (Therapeutic Exercise)    Hip PROM (Therapeutic Exercise) right;extension;supine;10 repetitions;10 second hold  -     Hip Strengthening (Therapeutic Exercise) bilateral;flexion;aBduction;aDduction;external rotation;internal rotation;marching while seated;sitting;supine;resistance band;green;10 repetitions;2 sets  -     Row Name 05/05/22 1435          Knee (Therapeutic Exercise)    Knee PROM (Therapeutic Exercise) left;extension;supine;10 repetitions;10 second hold  -     Knee Strengthening (Therapeutic  Exercise) left;extension;SAQ (short arc quad);marching while seated;SLR (straight leg raise);sitting;supine;resistance band;green;10 repetitions;2 sets  -     Row Name 05/05/22 1435          Positioning and Restraints    Pre-Treatment Position sitting in chair/recliner  -RG     Post Treatment Position wheelchair  -RG     In Wheelchair notified nsg;sitting;call light within reach;encouraged to call for assist  -     Row Name 05/05/22 1435          Therapy Assessment/Plan (PT)    Patient's Goals For Discharge return home  -     Row Name 05/05/22 1435          Therapy Assessment/Plan (PT)    Rehab Potential/Prognosis (PT) adequate, monitor progress closely  -RG     Frequency of Treatment (PT) 5 times per week  -RG     Estimated Duration of Therapy (PT) 3 weeks  -RG     Problem List (PT) balance;mobility;range of motion (ROM);strength;pain;postural control  -     Activity Limitations Related to Problem List (PT) unable to ambulate safely;unable to transfer safely  -     Row Name 05/05/22 1435          Daily Progress Summary (PT)    Impairments Still Limiting Function (PT) flexibility decreased;functional activity tolerance impairment;pain;strength deficit  -     Row Name 05/05/22 1435          Therapy Plan Review/Discharge Plan (PT)    Anticipated Equipment Needs at Discharge (PT Eval) --  tbd  -RG     Anticipated Discharge Disposition (PT) home with home health  -     Row Name 05/05/22 1435          IRF PT Goals    Bed Mobility Goal Selection (PT-IRF) bed mobility, PT goal 1;bed mobility, PT goal 2  -RG     Transfer Goal Selection (PT-IRF) transfers, PT goal 1;transfers, PT goal 2  -RG     Balance Goal Selection (PT) balance, PT goal 1;balance, PT goal 2  -     Row Name 05/05/22 1435          Bed Mobility Goal 1 (PT-IRF)    Activity/Assistive Device (Bed Mobility Goal 1, PT-IRF) sit to supine/supine to sit  -RG     [place] Level (Bed Mobility Goal 1, PT-IRF) minimum assist (75% or more patient  effort)  -RG     Time Frame (Bed Mobility Goal 1, PT-IRF) short-term goal (STG);1 week  -RG     Progress/Outcomes (Bed Mobility Goal 1, PT-IRF) goal ongoing  -RG     Row Name 05/05/22 1435          Bed Mobility Goal 2 (PT-IRF)    Activity/Assistive Device (Bed Mobility Goal 2, PT-IRF) sit to supine/supine to sit  -RG     [place] Level (Bed Mobility Goal 2, PT-IRF) modified independence  -RG     Time Frame (Bed Mobility Goal 2, PT-IRF) by discharge  -RG     Progress/Outcomes (Bed Mobility Goal 2, PT-IRF) goal ongoing  -RG     Row Name 05/05/22 1435          Transfer Goal 1 (PT-IRF)    Activity/Assistive Device (Transfer Goal 1, PT-IRF) bed-to-chair/chair-to-bed  -RG     [place] Level (Transfer Goal 1, PT-IRF) moderate assist (50-74% patient effort)  with sliding board  -RG     Time Frame (Transfer Goal 1, PT-IRF) short-term goal (STG);1 week  -RG     Progress/Outcomes (Transfer Goal 1, PT-IRF) goal ongoing  -RG     Row Name 05/05/22 1435          Transfer Goal 2 (PT-IRF)    Activity/Assistive Device (Transfer Goal 2, PT-IRF) bed-to-chair/chair-to-bed  -RG     [place] Level (Transfer Goal 2, PT-IRF) modified independence  with sliding board  -RG     Time Frame (Transfer Goal 2, PT-IRF) by discharge  -RG     Progress/Outcomes (Transfer Goal 2, PT-IRF) goal ongoing  -RG     Row Name 05/05/22 1435          Balance Goal 1 (PT)    Activity/Assistive Device (Balance Goal 1, PT) sitting, static  -RG     [place] Level/Cues Needed (Balance Goal 1, PT) contact guard assist  -RG     Time Frame (Balance Goal 1, PT) short term goal (STG);1 week  -RG     Progress/Outcomes (Balance Goal 1, PT) goal ongoing  -RG     Row Name 05/05/22 1435          Balance Goal 2 (PT)    Activity/Assistive Device (Balance Goal 2, PT) sitting, dynamic  -RG     [place] Level (Balance Goal 2, PT) conditional independence  -RG     Time Frame (Balance Goal 2, PT) by discharge  -RG     Progress/Outcome (Balance Goal 2, PT) goal  ongoing  -RG           User Key  (r) = Recorded By, (t) = Taken By, (c) = Cosigned By    Initials Name Provider Type    Adam Schrader PTA Physical Therapist Assistant              Wound 04/25/22 1525 Right lower gluteal Pressure Injury (Active)   Dressing Appearance dry;intact 05/05/22 0740   Closure None 05/04/22 1946   Base red;non-blanchable 05/04/22 1946   Periwound excoriated;redness;pink 05/04/22 1946   Care, Wound cleansed with;sterile normal saline 05/04/22 1946   Dressing Care dressing changed 05/04/22 1946       Wound 04/25/22 1525 Right upper gluteal Pressure Injury (Active)   Dressing Appearance dry;intact 05/05/22 0740   Closure None 05/04/22 1946   Base red;yellow;non-blanchable 05/04/22 1946   Periwound pink;redness 05/04/22 1946   Drainage Amount none 05/05/22 0740   Care, Wound cleansed with;sterile normal saline 05/04/22 1946   Dressing Care dressing changed 05/04/22 1946       Wound 04/25/22 1525 Bilateral medial sacral spine Pressure Injury (Active)   Dressing Appearance dry;intact 05/05/22 0740   Closure None 05/04/22 1946   Base red;non-blanchable 05/04/22 1946   Periwound redness 05/04/22 1946   Drainage Amount none 05/04/22 1946   Care, Wound cleansed with;sterile normal saline 05/04/22 1946   Dressing Care dressing changed 05/04/22 1946       Wound 04/25/22 1525 Left medial gluteal Pressure Injury (Active)   Dressing Appearance dry;intact 05/05/22 0740   Closure None 05/04/22 1946   Base pink;red 05/04/22 1946   Periwound redness 05/04/22 1946   Drainage Amount none 05/04/22 1946   Care, Wound cleansed with;sterile normal saline 05/04/22 1946   Dressing Care dressing changed 05/04/22 1946       Wound 04/25/22 1255 Left lateral gluteal Pressure Injury (Active)   Dressing Appearance dry;intact 05/05/22 0740   Base non-blanchable;red 05/04/22 1946   Periwound redness 05/04/22 1946   Care, Wound cleansed with;sterile normal saline 05/04/22 1946   Dressing Care dressing changed 05/04/22 1946        Wound 04/25/22 1525 Left knee Amputation stump (Active)   Dressing Appearance open to air 05/05/22 0740   Base clean;dry 05/04/22 1946   Dressing Care open to air 05/05/22 0740       Wound 04/25/22 1525 Right knee Amputation stump (Active)   Dressing Appearance dry;intact 05/05/22 0740   Closure Approximated;Staples 05/04/22 1946   Base blanchable;dry 05/04/22 1946   Care, Wound cleansed with;sterile normal saline 05/04/22 1946   Dressing Care dressing changed 05/04/22 1946     Physical Therapy Education                 Title: PT OT SLP Therapies (Done)     Topic: Physical Therapy (Done)     Point: Mobility training (Done)     Learning Progress Summary           Patient Acceptance, E,D, VU,NR by  at 5/5/2022 1441      Show all documentation for this point (9)                 Point: Home exercise program (Done)     Learning Progress Summary           Patient Acceptance, E,D, VU,NR by  at 5/5/2022 1441      Show all documentation for this point (9)                 Point: Body mechanics (Done)     Learning Progress Summary           Patient Acceptance, E,D, VU,NR by  at 5/5/2022 1441      Show all documentation for this point (9)                 Point: Precautions (Done)     Learning Progress Summary           Patient Acceptance, E,D, VU,NR by  at 5/5/2022 1441      Show all documentation for this point (9)                             User Key     Initials Effective Dates Name Provider Type Discipline     06/16/21 -  Adam Goodman PTA Physical Therapist Assistant PT                PT Recommendation and Plan    Frequency of Treatment (PT): 5 times per week  Anticipated Equipment Needs at Discharge (PT Eval):  (tbd)  Daily Progress Summary (PT)  Impairments Still Limiting Function (PT): flexibility decreased, functional activity tolerance impairment, pain, strength deficit               Time Calculation:      PT Charges     Row Name 05/05/22 1441             Time Calculation    Start Time 0915  -       Stop Time 1045  -RG      Time Calculation (min) 90 min  -RG      PT Received On 05/05/22  -RG              Time Calculation- PT    Total Timed Code Minutes- PT 90 minute(s)  -RG            User Key  (r) = Recorded By, (t) = Taken By, (c) = Cosigned By    Initials Name Provider Type    Adam Schrader PTA Physical Therapist Assistant                Therapy Charges for Today     Code Description Service Date Service Provider Modifiers Qty    28879347948 HC PT THERAPEUTIC ACT EA 15 MIN 5/4/2022 Adam Goodman PTA GP, CQ 2    16878793027 HC PT THER PROC EA 15 MIN 5/4/2022 Adam Goodman PTA GP, CQ 4    28490900242 HC PT THERAPEUTIC ACT EA 15 MIN 5/5/2022 Adam Goodman PTA GP, CQ 2    87915023346 HC PT THER PROC EA 15 MIN 5/5/2022 Adam Goodman PTA GP, CQ 3    84552601663 HC PT NEUROMUSC RE EDUCATION EA 15 MIN 5/5/2022 Adam Goodman PTA GP, CQ 1                   Adam Goodman PTA  5/5/2022

## 2022-05-06 LAB — GLUCOSE BLDC GLUCOMTR-MCNC: 94 MG/DL (ref 70–130)

## 2022-05-06 PROCEDURE — 97112 NEUROMUSCULAR REEDUCATION: CPT

## 2022-05-06 PROCEDURE — 97110 THERAPEUTIC EXERCISES: CPT

## 2022-05-06 PROCEDURE — 97535 SELF CARE MNGMENT TRAINING: CPT

## 2022-05-06 PROCEDURE — 97530 THERAPEUTIC ACTIVITIES: CPT

## 2022-05-06 PROCEDURE — 99231 SBSQ HOSP IP/OBS SF/LOW 25: CPT | Performed by: FAMILY MEDICINE

## 2022-05-06 PROCEDURE — 25010000002 HEPARIN (PORCINE) PER 1000 UNITS: Performed by: INTERNAL MEDICINE

## 2022-05-06 PROCEDURE — 82962 GLUCOSE BLOOD TEST: CPT

## 2022-05-06 RX ADMIN — METOPROLOL TARTRATE 12.5 MG: 25 TABLET ORAL at 08:35

## 2022-05-06 RX ADMIN — FERROUS SULFATE TAB 325 MG (65 MG ELEMENTAL FE) 325 MG: 325 (65 FE) TAB at 08:35

## 2022-05-06 RX ADMIN — FERROUS SULFATE TAB 325 MG (65 MG ELEMENTAL FE) 325 MG: 325 (65 FE) TAB at 17:38

## 2022-05-06 RX ADMIN — HEPARIN SODIUM 5000 UNITS: 5000 INJECTION INTRAVENOUS; SUBCUTANEOUS at 08:36

## 2022-05-06 RX ADMIN — ATORVASTATIN CALCIUM 40 MG: 40 TABLET, FILM COATED ORAL at 21:09

## 2022-05-06 RX ADMIN — FAMOTIDINE 20 MG: 20 TABLET, FILM COATED ORAL at 08:35

## 2022-05-06 RX ADMIN — METOPROLOL TARTRATE 12.5 MG: 25 TABLET ORAL at 21:09

## 2022-05-06 RX ADMIN — ASPIRIN 81 MG: 81 TABLET, CHEWABLE ORAL at 08:35

## 2022-05-06 RX ADMIN — HYDROXYZINE HYDROCHLORIDE 25 MG: 25 TABLET ORAL at 21:09

## 2022-05-06 RX ADMIN — Medication 1 TABLET: at 08:35

## 2022-05-06 RX ADMIN — HEPARIN SODIUM 5000 UNITS: 5000 INJECTION INTRAVENOUS; SUBCUTANEOUS at 21:10

## 2022-05-06 RX ADMIN — BUPRENORPHINE HYDROCHLORIDE AND NALOXONE HYDROCHLORIDE DIHYDRATE 2 TABLET: 8; 2 TABLET SUBLINGUAL at 08:35

## 2022-05-06 NOTE — PROGRESS NOTES
[facility]  PROGRESS NOTE     Patient Identification:  Name:  Syed Grajeda  Age:  57 y.o.  Sex:  male  :  1965  MRN:  [id number]  Visit Number:  [id number]  ROOM: [facility]     Primary Care Provider:  Zheng Serrato MD    Length of stay in inpatient status:  11    Subjective     Chief Compliant:  No chief complaint on file.      History of Presenting Illness: 56-year-old gentleman with a history of right AKA, past history of BKA on the left, diabetes mellitus, ASCVD, sacral decubitus wound, opioid dependency, chronic pain, neuropathy.  Patient has no new complaints this morning is doing well.    Objective     Current Hospital Meds:aspirin, 81 mg, Oral, Daily  atorvastatin, 40 mg, Oral, Nightly  buprenorphine-naloxone, 2 tablet, Sublingual, Daily  famotidine, 20 mg, Oral, Daily  ferrous sulfate, 325 mg, Oral, BID With Meals  heparin (porcine), 5,000 Units, Subcutaneous, Q12H  metoprolol tartrate, 12.5 mg, Oral, Q12H  multivitamin with minerals, 1 tablet, Oral, Daily       ----------------------------------------------------------------------------------------------------------------------  Vital Signs:  Temp:  [97.1 °F (36.2 °C)-97.8 °F (36.6 °C)] 97.8 °F (36.6 °C)  Heart Rate:  [80-84] 80  Resp:  [18] 18  BP: (120-152)/(67-68) 120/68  SpO2:  [97 %-98 %] 98 %  on   ;   Device (Oxygen Therapy): room air  Body mass index is 46.39 kg/m².    Wt Readings from Last 3 Encounters:   22 74.8 kg (164 lb 15.5 oz)   19 81.6 kg (180 lb)     Intake & Output (last 3 days)        0701   0700  0701   07 0701   0700  0701   0700    P.O. 1080 1680 1560 360    Total Intake(mL/kg) 1080 (14.4) 1680 (22.5) 1560 (20.9) 360 (4.8)    Urine (mL/kg/hr) 325 (0.2)  500 (0.3) 240 (0.6)    Total Output 325  500 240    Net +755 +1680 +1060 +120            Urine Unmeasured Occurrence 1 x 6 x 3 x     Stool Unmeasured Occurrence 1 x 1 x 1 x         Diet Soft Texture;  Ground; Thin; Cardiac, Consistent Carbohydrate  ----------------------------------------------------------------------------------------------------------------------  Physical exam:  Constitutional: No acute distress   HEENT: Normocephalic atraumatic  Neck: Supple   Cardiovascular: Regular rate and rhythm  Pulmonary/Chest: Clear to auscultation  Abdominal: Positive bowel sounds soft.   Musculoskeletal: Status post right AKA, history of remote left BKA  Neurological: No focal deficits  Skin: Sacral decubitus wound is dressed  Peripheral vascular:  Genitourinary:  ----------------------------------------------------------------------------------------------------------------------    Last echocardiogram:    ----------------------------------------------------------------------------------------------------------------------  Results from last 7 days   Lab Units 05/03/22  1007 05/01/22  0105 04/30/22  0049   WBC 10*3/mm3 4.46 4.39 4.42   HEMOGLOBIN g/dL 8.1* 7.7* 7.3*   HEMATOCRIT % 26.9* 24.9* 24.0*   MCV fL 93.7 91.2 90.9   MCHC g/dL 30.1* 30.9* 30.4*   PLATELETS 10*3/mm3 605* 725* 675*         Results from last 7 days   Lab Units 05/03/22  1007 05/01/22  0105 04/30/22  0049   SODIUM mmol/L 139 138 140   POTASSIUM mmol/L 3.9 3.8 3.8   CHLORIDE mmol/L 103 103 105   CO2 mmol/L 30.4* 29.0 27.3   BUN mg/dL 10 9 8   CREATININE mg/dL 0.81 0.77 0.75*   CALCIUM mg/dL 7.8* 7.8* 7.5*   GLUCOSE mg/dL 96 83 64*   ALBUMIN g/dL  --   --  1.57*   BILIRUBIN mg/dL  --   --  0.2   ALK PHOS U/L  --   --  157*   AST (SGOT) U/L  --   --  31   ALT (SGPT) U/L  --   --  26   Estimated Creatinine Clearance: 76.1 mL/min (by C-G formula based on SCr of 0.81 mg/dL).  No results found for: AMMONIA              Glucose   Date/Time Value Ref Range Status   05/06/2022 1134 94 70 - 130 mg/dL Final     Comment:     Meter: [id number] : [id number] celina gomez   05/05/2022 1106 88 70 - 130 mg/dL Final     Comment:     Meter: [id number]  : [id number] crystal gomez   05/05/2022 0549 78 70 - 130 mg/dL Final     Comment:     Meter: [id number] : [id number] Paul Crystal   05/04/2022 1954 136 (H) 70 - 130 mg/dL Final     Comment:     Meter: [id number] : [id number] Paul Crystal   05/04/2022 1616 79 70 - 130 mg/dL Final     Comment:     Meter: [id number] : [id number] Gino Russo   05/04/2022 1122 98 70 - 130 mg/dL Final     Comment:     Meter: [id number] : [id number] Jerman Tabor C   05/04/2022 0610 76 70 - 130 mg/dL Final     Comment:     Meter: [id number] : [id number] Luis Horner   05/03/2022 1955 87 70 - 130 mg/dL Final     Comment:     Meter: [id number] : [id number] Luis Horner     No results found for: TSH, FREET4  No results found for: PREGTESTUR, PREGSERUM, HCG, HCGQUANT  Pain Management Panel    There is no flowsheet data to display.       Brief Urine Lab Results     None        No results found for: BLOODCX      No results found for: URINECX  No results found for: WOUNDCX  No results found for: STOOLCX        I have personally looked at the labs and they are summarized above.  ----------------------------------------------------------------------------------------------------------------------  Detailed radiology reports for the last 24 hours:    Imaging Results (Last 24 Hours)     ** No results found for the last 24 hours. **        Final impressions for the last 30 days of radiology reports:    CT Abdomen Pelvis Without Contrast    Result Date: 4/28/2022   1. Consolidation in the right lung and small bilateral effusions, right greater than left.  2.Thickening of the urinary bladder wall.   3. Mild degree of anasarca.  4. Moderate to large volume stool.     This report was finalized on 4/28/2022 4:59 PM by Dr. Km Adams MD.      US Liver    Result Date: 4/27/2022  Homogeneous echotexture of the liver.  This report was finalized on 4/27/2022 4:22 PM by Dr. Km Adams MD.      I have personally  looked at the radiology images and read the final radiology report.    Assessment & Plan    Status post right AKA with remote history of left BKA--patient requiring minimum assistance for upper lower body dressing; set up for grooming; minimum two-person assist for transfers; continues to work on motor skills to improve ADLs.    Diabetes mellitus well controlled patient off insulin at this time    Diarrhea resolved    Thrombocytosis stable likely secondary to recent acute surgery    Anemia stable    Sacral decubitus wound continue local care    Hypertension controlled    VTE Prophylaxis:   Mechanical Order History:     None      Pharmalogical Order History:      Ordered     Dose Route Frequency Stop    04/25/22 1529  heparin (porcine) 5000 UNIT/ML injection 5,000 Units         5,000 Units SC Every 12 Hours Scheduled --                    Manohar Piedra MD  [facility]  05/06/22  12:03 EDT

## 2022-05-06 NOTE — PROGRESS NOTES
Occupational Therapy:    Physical Therapy: Individual: 90 minutes.    Speech Language Pathology:    Signed by: Adam Godoman PTA

## 2022-05-06 NOTE — PROGRESS NOTES
Rehabilitation Nursing  Inpatient Rehabilitation Plan of Care Note    Plan of Care  Copy from Peggy    Pain Management (Active)  Current Status (4/25/2022 3:25:00 PM): Potential for increased pain  Weekly Goal: Tolerable pain level  Discharge Goal: No pain    Body Function Structure    Skin Integrity (Active)  Current Status (4/25/2022 3:25:00 PM): Impaired skin integrity  Weekly Goal: No more skin breakdown/improvement of skin integrity  Discharge Goal: Improved skin integrity    Safety    Potential for Injury (Active)  Current Status (4/25/2022 3:25:00 PM): Potential for falls  Weekly Goal: No falls  Discharge Goal: No falls    Signed by: Mayelin Stewart, Nurse

## 2022-05-06 NOTE — THERAPY TREATMENT NOTE
Inpatient Rehabilitation - Physical Therapy Treatment Note       [facility]     Patient Name: Syed Grajeda  : 1965  MRN: [id number]    Today's Date: 2022                    Admit Date: 2022      Visit Dx:     ICD-10-CM ICD-9-CM   1. S/P AKA (above knee amputation) unilateral, right (HCC)  Z89.611 V49.76       Patient Active Problem List   Diagnosis   • Cataract, nuclear sclerotic, right eye   • S/P AKA (above knee amputation) unilateral, right (HCC)       Past Medical History:   Diagnosis Date   • Arthritis    • Diabetes mellitus (CMS/HCC)    • GERD (gastroesophageal reflux disease)    • Hiatal hernia    • IBS (irritable bowel syndrome)        Past Surgical History:   Procedure Laterality Date   • APPENDECTOMY     • BELOW KNEE AMPUTATION Left    • CATARACT EXTRACTION W/ INTRAOCULAR LENS IMPLANT Right 2019    Procedure: CATARACT PHACO EXTRACTION WITH INTRAOCULAR LENS IMPLANT;  Surgeon: Tan Gonzalez MD;  Location: [facility];  Service: Ophthalmology   • TOE AMPUTATION Right     right small toe       PT ASSESSMENT (last 12 hours)     IRF PT Evaluation and Treatment     Row Name 22 1513          PT Time and Intention    Document Type daily treatment  -RG     Mode of Treatment individual therapy;physical therapy  -RG     Patient/Family/Caregiver Comments/Observations Pt and nursing in agreement for skilled PT on this date.  -RG     Row Name 22 1513          General Information    Patient Profile Reviewed yes  -RG     Existing Precautions/Restrictions fall  <skin integrity, BLE amputations  -RG     Row Name 22 1513          Pain Scale: FACES Pre/Post-Treatment    Pain: FACES Scale, Pretreatment 4-->hurts little more  -RG     Posttreatment Pain Rating 6-->hurts even more  -RG     Row Name 22 1513          Cognition/Psychosocial    Affect/Mental Status (Cognition) WFL  -RG     Follows Commands (Cognition) verbal cues/prompting required  -RG     Personal Safety  Interventions gait belt;fall prevention program maintained  -     Row Name 05/06/22 1513          Bed Mobility    Supine-Sit [place] (Bed Mobility) minimum assist (75% patient effort);moderate assist (50% patient effort)  -     Sit-Supine [place] (Bed Mobility) minimum assist (75% patient effort);moderate assist (50% patient effort)  -     Row Name 05/06/22 1513          Transfers    Bed-Chair [place] (Transfers) moderate assist (50% patient effort)  -     Chair-Bed [place] (Transfers) moderate assist (50% patient effort)  -     Assistive Device (Bed-Chair Transfers) wheelchair;transfer board  -     Row Name 05/06/22 1513          Chair-Bed Transfer    Assistive Device (Chair-Bed Transfers) transfer board  -     Row Name 05/06/22 1513          Safety Issues, Functional Mobility    Impairments Affecting Function (Mobility) balance;endurance/activity tolerance;pain;postural/trunk control;range of motion (ROM);strength  -     Row Name 05/06/22 1513          Balance    Static Sitting Balance supervision  -     Dynamic Sitting Balance contact guard  -     Position, Sitting Balance sitting edge of mat;sitting edge of bed  -     Row Name 05/06/22 1513          Hip (Therapeutic Exercise)    Hip PROM (Therapeutic Exercise) bilateral;flexion;extension;aBduction;aDduction;supine;sidelying;10 repetitions;10 second hold  -     Hip Strengthening (Therapeutic Exercise) bilateral;flexion;extension;aBduction;aDduction;external rotation;internal rotation;marching while seated;sitting;sidelying;supine  -     Row Name 05/06/22 1513          Knee (Therapeutic Exercise)    Knee PROM (Therapeutic Exercise) left;extension;sitting;sidelying;10 repetitions;10 second hold  -     Knee Strengthening (Therapeutic Exercise) left;flexion;marching while seated;LAQ (long arc quad);sitting;SAQ (short arc quad);supine;10 repetitions;2 sets  -     Row Name 05/06/22 1513          Positioning and  Restraints    Pre-Treatment Position in bed  -RG     Post Treatment Position bed  -RG     In Bed notified nsg;supine;call light within reach;encouraged to call for assist  -RG     Row Name 05/06/22 1513          Therapy Assessment/Plan (PT)    Patient's Goals For Discharge return home  -     Row Name 05/06/22 1513          Therapy Assessment/Plan (PT)    Rehab Potential/Prognosis (PT) adequate, monitor progress closely  -RG     Frequency of Treatment (PT) 5 times per week  -RG     Estimated Duration of Therapy (PT) 3 weeks  -RG     Problem List (PT) balance;mobility;range of motion (ROM);strength;pain;postural control  -RG     Activity Limitations Related to Problem List (PT) unable to ambulate safely;unable to transfer safely  -     Row Name 05/06/22 1513          Daily Progress Summary (PT)    Impairments Still Limiting Function (PT) flexibility decreased;functional activity tolerance impairment;pain;strength deficit  -     Row Name 05/06/22 1513          Therapy Plan Review/Discharge Plan (PT)    Anticipated Equipment Needs at Discharge (PT Eval) --  tbd  -RG     Anticipated Discharge Disposition (PT) home with home health  -     Row Name 05/06/22 1513          IRF PT Goals    Bed Mobility Goal Selection (PT-IRF) bed mobility, PT goal 1;bed mobility, PT goal 2  -RG     Transfer Goal Selection (PT-IRF) transfers, PT goal 1;transfers, PT goal 2  -RG     Balance Goal Selection (PT) balance, PT goal 1;balance, PT goal 2  -     Row Name 05/06/22 1513          Bed Mobility Goal 1 (PT-IRF)    Activity/Assistive Device (Bed Mobility Goal 1, PT-IRF) sit to supine/supine to sit  -RG     [place] Level (Bed Mobility Goal 1, PT-IRF) minimum assist (75% or more patient effort)  -RG     Time Frame (Bed Mobility Goal 1, PT-IRF) short-term goal (STG);1 week  -RG     Progress/Outcomes (Bed Mobility Goal 1, PT-IRF) goal ongoing  -     Row Name 05/06/22 1513          Bed Mobility Goal 2 (PT-IRF)     Activity/Assistive Device (Bed Mobility Goal 2, PT-IRF) sit to supine/supine to sit  -RG     [place] Level (Bed Mobility Goal 2, PT-IRF) modified independence  -RG     Time Frame (Bed Mobility Goal 2, PT-IRF) by discharge  -RG     Progress/Outcomes (Bed Mobility Goal 2, PT-IRF) goal ongoing  -RG     Row Name 05/06/22 1513          Transfer Goal 1 (PT-IRF)    Activity/Assistive Device (Transfer Goal 1, PT-IRF) bed-to-chair/chair-to-bed  -RG     [place] Level (Transfer Goal 1, PT-IRF) moderate assist (50-74% patient effort)  with sliding board  -RG     Time Frame (Transfer Goal 1, PT-IRF) short-term goal (STG);1 week  -RG     Progress/Outcomes (Transfer Goal 1, PT-IRF) goal ongoing  -RG     Row Name 05/06/22 1513          Transfer Goal 2 (PT-IRF)    Activity/Assistive Device (Transfer Goal 2, PT-IRF) bed-to-chair/chair-to-bed  -RG     [place] Level (Transfer Goal 2, PT-IRF) modified independence  with sliding board  -RG     Time Frame (Transfer Goal 2, PT-IRF) by discharge  -RG     Progress/Outcomes (Transfer Goal 2, PT-IRF) goal ongoing  -RG     Row Name 05/06/22 1513          Balance Goal 1 (PT)    Activity/Assistive Device (Balance Goal 1, PT) sitting, static  -RG     [place] Level/Cues Needed (Balance Goal 1, PT) contact guard assist  -RG     Time Frame (Balance Goal 1, PT) short term goal (STG);1 week  -RG     Progress/Outcomes (Balance Goal 1, PT) goal ongoing  -RG     Row Name 05/06/22 1513          Balance Goal 2 (PT)    Activity/Assistive Device (Balance Goal 2, PT) sitting, dynamic  -RG     [place] Level (Balance Goal 2, PT) conditional independence  -RG     Time Frame (Balance Goal 2, PT) by discharge  -RG     Progress/Outcome (Balance Goal 2, PT) goal ongoing  -RG           User Key  (r) = Recorded By, (t) = Taken By, (c) = Cosigned By    Initials Name Provider Type    Adam Schrader PTA Physical Therapist Assistant              Wound 04/25/22 1528 Right lower gluteal Pressure  Injury (Active)   Dressing Appearance dry;intact 05/06/22 0835   Closure None 05/06/22 0835   Base red;non-blanchable 05/06/22 0835   Periwound excoriated;redness;pink 05/06/22 0835   Care, Wound cleansed with;sterile normal saline;honey applied;pressure removed 05/06/22 0835   Dressing Care dressing changed 05/06/22 0835       Wound 04/25/22 1525 Right upper gluteal Pressure Injury (Active)   Dressing Appearance dry;intact 05/06/22 0835   Closure None 05/06/22 0835   Base red;yellow;non-blanchable 05/06/22 0835   Periwound pink;redness 05/06/22 0835   Drainage Amount none 05/06/22 0835   Care, Wound cleansed with;sterile normal saline;honey applied;pressure removed 05/06/22 0835   Dressing Care dressing changed 05/06/22 0835       Wound 04/25/22 1525 Bilateral medial sacral spine Pressure Injury (Active)   Dressing Appearance dry;intact 05/06/22 0835   Closure None 05/06/22 0835   Base red;non-blanchable 05/06/22 0835   Periwound redness 05/06/22 0835   Drainage Amount none 05/06/22 0835   Care, Wound cleansed with;sterile normal saline;honey applied;pressure removed 05/06/22 0835   Dressing Care dressing changed 05/06/22 0835       Wound 04/25/22 1525 Left medial gluteal Pressure Injury (Active)   Dressing Appearance dry;intact 05/06/22 0835   Closure None 05/06/22 0835   Base pink;red 05/06/22 0835   Periwound redness 05/06/22 0835   Drainage Amount none 05/06/22 0835   Care, Wound cleansed with;sterile normal saline;honey applied;pressure removed 05/06/22 0835   Dressing Care dressing changed 05/06/22 0835       Wound 04/25/22 1255 Left lateral gluteal Pressure Injury (Active)   Dressing Appearance dry;intact 05/06/22 0835   Closure None 05/06/22 0835   Base non-blanchable;red 05/06/22 0835   Periwound redness 05/06/22 0835   Drainage Amount none 05/06/22 0835   Care, Wound cleansed with;sterile normal saline;honey applied;pressure removed 05/06/22 0835   Dressing Care dressing changed 05/06/22 0835       Wound  04/25/22 1525 Left knee Amputation stump (Active)   Dressing Appearance open to air 05/06/22 0835   Base clean;dry 05/06/22 0835       Wound 04/25/22 1525 Right knee Amputation stump (Active)   Dressing Appearance dry;intact 05/06/22 0835   Closure UBALDO 05/06/22 0835   Base dressing in place, unable to visualize 05/06/22 0835   Drainage Amount none 05/06/22 0835   Care, Wound cleansed with 05/06/22 0558   Dressing Care dressing changed 05/06/22 0558     Physical Therapy Education                 Title: PT OT SLP Therapies (Done)     Topic: Physical Therapy (Done)     Point: Mobility training (Done)     Learning Progress Summary           Patient Acceptance, E,D, VU,NR by  at 5/6/2022 1522      Show all documentation for this point (10)                 Point: Home exercise program (Done)     Learning Progress Summary           Patient Acceptance, E,D, VU,NR by  at 5/6/2022 1522      Show all documentation for this point (10)                 Point: Body mechanics (Done)     Learning Progress Summary           Patient Acceptance, E,D, VU,NR by  at 5/6/2022 1522      Show all documentation for this point (10)                 Point: Precautions (Done)     Learning Progress Summary           Patient Acceptance, E,D, VU,NR by  at 5/6/2022 1522      Show all documentation for this point (10)                             User Key     Initials Effective Dates Name Provider Type Discipline     06/16/21 -  Adam Goodman PTA Physical Therapist Assistant PT                PT Recommendation and Plan    Frequency of Treatment (PT): 5 times per week  Anticipated Equipment Needs at Discharge (PT Eval):  (tbd)  Daily Progress Summary (PT)  Impairments Still Limiting Function (PT): flexibility decreased, functional activity tolerance impairment, pain, strength deficit               Time Calculation:      PT Charges     Row Name 05/06/22 1523 05/06/22 1522          Time Calculation    Start Time 1330  -RG 1045  -     Stop  Time 1415  -RG 1130  -RG     Time Calculation (min) 45 min  -RG 45 min  -RG     PT Received On 05/06/22  -RG 05/06/22  -RG            Time Calculation- PT    Total Timed Code Minutes- PT 45 minute(s)  -RG 45 minute(s)  -RG           User Key  (r) = Recorded By, (t) = Taken By, (c) = Cosigned By    Initials Name Provider Type    Adam Schrader PTA Physical Therapist Assistant                Therapy Charges for Today     Code Description Service Date Service Provider Modifiers Qty    40055900441 HC PT THERAPEUTIC ACT EA 15 MIN 5/5/2022 Adam Goodman, GISSEL GP, CQ 2    60570607273 HC PT THER PROC EA 15 MIN 5/5/2022 Adam Goodman PTA GP, CQ 3    62973109655 HC PT NEUROMUSC RE EDUCATION EA 15 MIN 5/5/2022 Adam Goodman PTA GP, CQ 1    30694294803 HC PT THERAPEUTIC ACT EA 15 MIN 5/6/2022 Adam Goodman PTA GP, CQ 2    15237958431 HC PT THER PROC EA 15 MIN 5/6/2022 Adam Goodman PTA GP, CQ 3    41636910328 HC PT NEUROMUSC RE EDUCATION EA 15 MIN 5/6/2022 Adam Goodman PTA GP, CQ 1                   Adam Goodman PTA  5/6/2022

## 2022-05-06 NOTE — PROGRESS NOTES
Occupational Therapy: Individual: 100 minutes.    Physical Therapy:    Speech Language Pathology:    Signed by: Jenny French, Occupational Therapist

## 2022-05-06 NOTE — THERAPY TREATMENT NOTE
Inpatient Rehabilitation - Occupational Therapy Treatment Note     [place]     Patient Name: Syed Grajeda  : 1965  MRN: [id number]    Today's Date: 2022                 Admit Date: 2022         ICD-10-CM ICD-9-CM   1. S/P AKA (above knee amputation) unilateral, right (HCC)  Z89.611 V49.76       Patient Active Problem List   Diagnosis   • Cataract, nuclear sclerotic, right eye   • S/P AKA (above knee amputation) unilateral, right (HCC)       Past Medical History:   Diagnosis Date   • Arthritis    • Diabetes mellitus (CMS/HCC)    • GERD (gastroesophageal reflux disease)    • Hiatal hernia    • IBS (irritable bowel syndrome)        Past Surgical History:   Procedure Laterality Date   • APPENDECTOMY     • BELOW KNEE AMPUTATION Left    • CATARACT EXTRACTION W/ INTRAOCULAR LENS IMPLANT Right 2019    Procedure: CATARACT PHACO EXTRACTION WITH INTRAOCULAR LENS IMPLANT;  Surgeon: Tan Gnozalez MD;  Location: [facility];  Service: Ophthalmology   • TOE AMPUTATION Right     right small toe             IRF OT ASSESSMENT FLOWSHEET (last 12 hours)     IRF OT Evaluation and Treatment     Row Name 2244          OT Time and Intention    Document Type daily treatment  -TM     Mode of Treatment occupational therapy  -TM     Patient Effort good  -TM     Symptoms Noted During/After Treatment none  -TM     Row Name 22          General Information    General Observations of Patient Pt agreeable for therapy.  -TM     Existing Precautions/Restrictions fall;non-weight bearing;other (see comments)  NWB RLE, decreased skin integrity  -TM     Row Name 2244          Cognition/Psychosocial    Orientation Status (Cognition) oriented to;person;place;situation  -TM     Follows Commands (Cognition) follows one-step commands;verbal cues/prompting required  -TM     Row Name 2244          Lower Body Dressing    [place] Level (Lower Body Dressing) minimum assist (75% patient  effort);verbal cues  -TM     Position (Lower Body Dressing) supine  -TM     Row Name 05/06/22 0944          Grooming    [place] Level (Grooming) set up  -TM     Position (Grooming) supported sitting  -TM     Row Name 05/06/22 0944          Transfers    Bed-Chair [place] (Transfers) minimum assist (75% patient effort);2 person assist;verbal cues  -TM     Assistive Device (Bed-Chair Transfers) wheelchair;transfer board  -TM     Row Name 05/06/22 0944          Motor Skills    Motor Skills coordination;functional endurance;motor control/coordination interventions  -     Motor Control/Coordination Interventions therapeutic exercise/ROM;fine motor manipulation/dexterity activities;gross motor coordination activities;other (see comments)  BUE ther ex/act, GMC/FMC, bilateral coord  -           User Key  (r) = Recorded By, (t) = Taken By, (c) = Cosigned By    Initials Name Effective Dates     Jenny French OT 06/16/21 -                  Occupational Therapy Education                 Title: PT OT SLP Therapies (Done)     Topic: Occupational Therapy (Done)     Point: ADL training (Done)     Description:   Instruct learner(s) on proper safety adaptation and remediation techniques during self care or transfers.   Instruct in proper use of assistive devices.              Learning Progress Summary           Patient Acceptance, E,D, VU,NR by  at 5/6/2022 0942      Show all documentation for this point (10)                 Point: Precautions (Done)     Description:   Instruct learner(s) on prescribed precautions during self-care and functional transfers.              Learning Progress Summary           Patient Acceptance, E,D, VU,NR by  at 5/6/2022 0942      Show all documentation for this point (10)                             User Key     Initials Effective Dates Name Provider Type Discipline     06/16/21 -  Jenny French OT Occupational Therapist OT                    OT Recommendation and  Plan    Planned Therapy Interventions (OT): activity tolerance training, adaptive equipment training, BADL retraining, IADL retraining, occupation/activity based interventions, patient/caregiver education/training, ROM/therapeutic exercise, strengthening exercise, transfer/mobility retraining                    Time Calculation:      Time Calculation- OT     Row Name 05/06/22 0942             Time Calculation- OT    OT Start Time 0815  -TM      OT Stop Time 0955  -TM      OT Time Calculation (min) 100 min  -TM      Total Timed Code Minutes-  minute(s)  -TM            User Key  (r) = Recorded By, (t) = Taken By, (c) = Cosigned By    Initials Name Provider Type    TM Jenny French OT Occupational Therapist              Therapy Charges for Today     Code Description Service Date Service Provider Modifiers Qty    95934066799 HC OT SELF CARE/MGMT/TRAIN EA 15 MIN 5/5/2022 Jenny French, OT GO 2    35385102766 HC OT THERAPEUTIC ACT EA 15 MIN 5/5/2022 Jenny French OT GO 2    33865232172 HC OT THER PROC EA 15 MIN 5/5/2022 Jenny French, OT GO 1    28684730854 HC OT THERAPEUTIC ACT EA 15 MIN 5/5/2022 Jenny French, OT GO 1    05271440572 HC OT SELF CARE/MGMT/TRAIN EA 15 MIN 5/6/2022 Jenny French, OT GO 2    62922053597 HC OT THER PROC EA 15 MIN 5/6/2022 Jenny French OT GO 2    01502859048 HC OT THERAPEUTIC ACT EA 15 MIN 5/6/2022 Jenny French OT GO 3                   Jenny French OT  5/6/2022

## 2022-05-07 LAB
GLUCOSE BLDC GLUCOMTR-MCNC: 109 MG/DL (ref 70–130)
GLUCOSE BLDC GLUCOMTR-MCNC: 113 MG/DL (ref 70–130)

## 2022-05-07 PROCEDURE — 82962 GLUCOSE BLOOD TEST: CPT

## 2022-05-07 PROCEDURE — 25010000002 HEPARIN (PORCINE) PER 1000 UNITS: Performed by: INTERNAL MEDICINE

## 2022-05-07 RX ADMIN — HEPARIN SODIUM 5000 UNITS: 5000 INJECTION INTRAVENOUS; SUBCUTANEOUS at 09:21

## 2022-05-07 RX ADMIN — HYDROXYZINE HYDROCHLORIDE 25 MG: 25 TABLET ORAL at 21:19

## 2022-05-07 RX ADMIN — FAMOTIDINE 20 MG: 20 TABLET, FILM COATED ORAL at 09:21

## 2022-05-07 RX ADMIN — ATORVASTATIN CALCIUM 40 MG: 40 TABLET, FILM COATED ORAL at 21:19

## 2022-05-07 RX ADMIN — ASPIRIN 81 MG: 81 TABLET, CHEWABLE ORAL at 09:21

## 2022-05-07 RX ADMIN — FERROUS SULFATE TAB 325 MG (65 MG ELEMENTAL FE) 325 MG: 325 (65 FE) TAB at 09:21

## 2022-05-07 RX ADMIN — Medication 1 TABLET: at 09:21

## 2022-05-07 RX ADMIN — FERROUS SULFATE TAB 325 MG (65 MG ELEMENTAL FE) 325 MG: 325 (65 FE) TAB at 17:06

## 2022-05-07 RX ADMIN — METOPROLOL TARTRATE 12.5 MG: 25 TABLET ORAL at 09:21

## 2022-05-07 RX ADMIN — HEPARIN SODIUM 5000 UNITS: 5000 INJECTION INTRAVENOUS; SUBCUTANEOUS at 21:19

## 2022-05-07 RX ADMIN — METOPROLOL TARTRATE 12.5 MG: 25 TABLET ORAL at 21:19

## 2022-05-07 RX ADMIN — BUPRENORPHINE HYDROCHLORIDE AND NALOXONE HYDROCHLORIDE DIHYDRATE 2 TABLET: 8; 2 TABLET SUBLINGUAL at 09:21

## 2022-05-07 RX ADMIN — METHOCARBAMOL 500 MG: 500 TABLET, FILM COATED ORAL at 21:19

## 2022-05-08 LAB
ANION GAP SERPL CALCULATED.3IONS-SCNC: 4.2 MMOL/L (ref 5–15)
BASOPHILS # BLD AUTO: 0.02 10*3/MM3 (ref 0–0.2)
BASOPHILS NFR BLD AUTO: 0.3 % (ref 0–1.5)
BUN SERPL-MCNC: 13 MG/DL (ref 6–20)
BUN/CREAT SERPL: 15.3 (ref 7–25)
CALCIUM SPEC-SCNC: 7.6 MG/DL (ref 8.6–10.5)
CHLORIDE SERPL-SCNC: 106 MMOL/L (ref 98–107)
CO2 SERPL-SCNC: 28.8 MMOL/L (ref 22–29)
CREAT SERPL-MCNC: 0.85 MG/DL (ref 0.76–1.27)
DEPRECATED RDW RBC AUTO: 58.4 FL (ref 37–54)
EGFRCR SERPLBLD CKD-EPI 2021: 102.6 ML/MIN/1.73
EOSINOPHIL # BLD AUTO: 0.03 10*3/MM3 (ref 0–0.4)
EOSINOPHIL NFR BLD AUTO: 0.5 % (ref 0.3–6.2)
ERYTHROCYTE [DISTWIDTH] IN BLOOD BY AUTOMATED COUNT: 17.4 % (ref 12.3–15.4)
GLUCOSE BLDC GLUCOMTR-MCNC: 96 MG/DL (ref 70–130)
GLUCOSE BLDC GLUCOMTR-MCNC: 99 MG/DL (ref 70–130)
GLUCOSE SERPL-MCNC: 86 MG/DL (ref 65–99)
HCT VFR BLD AUTO: 25.4 % (ref 37.5–51)
HGB BLD-MCNC: 7.7 G/DL (ref 13–17.7)
IMM GRANULOCYTES # BLD AUTO: 0.01 10*3/MM3 (ref 0–0.05)
IMM GRANULOCYTES NFR BLD AUTO: 0.2 % (ref 0–0.5)
LYMPHOCYTES # BLD AUTO: 2.56 10*3/MM3 (ref 0.7–3.1)
LYMPHOCYTES NFR BLD AUTO: 44.6 % (ref 19.6–45.3)
MCH RBC QN AUTO: 28.4 PG (ref 26.6–33)
MCHC RBC AUTO-ENTMCNC: 30.3 G/DL (ref 31.5–35.7)
MCV RBC AUTO: 93.7 FL (ref 79–97)
MONOCYTES # BLD AUTO: 0.45 10*3/MM3 (ref 0.1–0.9)
MONOCYTES NFR BLD AUTO: 7.8 % (ref 5–12)
NEUTROPHILS NFR BLD AUTO: 2.67 10*3/MM3 (ref 1.7–7)
NEUTROPHILS NFR BLD AUTO: 46.6 % (ref 42.7–76)
NRBC BLD AUTO-RTO: 0 /100 WBC (ref 0–0.2)
PLATELET # BLD AUTO: 333 10*3/MM3 (ref 140–450)
PMV BLD AUTO: 10 FL (ref 6–12)
POTASSIUM SERPL-SCNC: 4 MMOL/L (ref 3.5–5.2)
RBC # BLD AUTO: 2.71 10*6/MM3 (ref 4.14–5.8)
SODIUM SERPL-SCNC: 139 MMOL/L (ref 136–145)
WBC NRBC COR # BLD: 5.74 10*3/MM3 (ref 3.4–10.8)

## 2022-05-08 PROCEDURE — 63710000001 ONDANSETRON PER 8 MG: Performed by: INTERNAL MEDICINE

## 2022-05-08 PROCEDURE — 82962 GLUCOSE BLOOD TEST: CPT

## 2022-05-08 PROCEDURE — 85025 COMPLETE CBC W/AUTO DIFF WBC: CPT | Performed by: FAMILY MEDICINE

## 2022-05-08 PROCEDURE — 25010000002 HEPARIN (PORCINE) PER 1000 UNITS: Performed by: INTERNAL MEDICINE

## 2022-05-08 PROCEDURE — 80048 BASIC METABOLIC PNL TOTAL CA: CPT | Performed by: FAMILY MEDICINE

## 2022-05-08 RX ADMIN — HYDROXYZINE HYDROCHLORIDE 25 MG: 25 TABLET ORAL at 21:19

## 2022-05-08 RX ADMIN — FERROUS SULFATE TAB 325 MG (65 MG ELEMENTAL FE) 325 MG: 325 (65 FE) TAB at 18:00

## 2022-05-08 RX ADMIN — ATORVASTATIN CALCIUM 40 MG: 40 TABLET, FILM COATED ORAL at 21:20

## 2022-05-08 RX ADMIN — METOPROLOL TARTRATE 12.5 MG: 25 TABLET ORAL at 09:35

## 2022-05-08 RX ADMIN — FERROUS SULFATE TAB 325 MG (65 MG ELEMENTAL FE) 325 MG: 325 (65 FE) TAB at 09:35

## 2022-05-08 RX ADMIN — METHOCARBAMOL 500 MG: 500 TABLET, FILM COATED ORAL at 21:19

## 2022-05-08 RX ADMIN — BUPRENORPHINE HYDROCHLORIDE AND NALOXONE HYDROCHLORIDE DIHYDRATE 2 TABLET: 8; 2 TABLET SUBLINGUAL at 09:35

## 2022-05-08 RX ADMIN — Medication 1 TABLET: at 09:35

## 2022-05-08 RX ADMIN — FAMOTIDINE 20 MG: 20 TABLET, FILM COATED ORAL at 09:35

## 2022-05-08 RX ADMIN — HEPARIN SODIUM 5000 UNITS: 5000 INJECTION INTRAVENOUS; SUBCUTANEOUS at 09:35

## 2022-05-08 RX ADMIN — ONDANSETRON HYDROCHLORIDE 4 MG: 4 TABLET, FILM COATED ORAL at 09:35

## 2022-05-08 RX ADMIN — ASPIRIN 81 MG: 81 TABLET, CHEWABLE ORAL at 09:35

## 2022-05-08 RX ADMIN — HEPARIN SODIUM 5000 UNITS: 5000 INJECTION INTRAVENOUS; SUBCUTANEOUS at 21:20

## 2022-05-09 LAB
GLUCOSE BLDC GLUCOMTR-MCNC: 84 MG/DL (ref 70–130)
GLUCOSE BLDC GLUCOMTR-MCNC: 88 MG/DL (ref 70–130)

## 2022-05-09 PROCEDURE — 97110 THERAPEUTIC EXERCISES: CPT

## 2022-05-09 PROCEDURE — 97530 THERAPEUTIC ACTIVITIES: CPT

## 2022-05-09 PROCEDURE — 25010000002 HEPARIN (PORCINE) PER 1000 UNITS: Performed by: INTERNAL MEDICINE

## 2022-05-09 PROCEDURE — 82962 GLUCOSE BLOOD TEST: CPT

## 2022-05-09 PROCEDURE — 97112 NEUROMUSCULAR REEDUCATION: CPT

## 2022-05-09 PROCEDURE — 97535 SELF CARE MNGMENT TRAINING: CPT

## 2022-05-09 PROCEDURE — 63710000001 ONDANSETRON PER 8 MG: Performed by: INTERNAL MEDICINE

## 2022-05-09 RX ADMIN — Medication 1 TABLET: at 09:05

## 2022-05-09 RX ADMIN — METHOCARBAMOL 500 MG: 500 TABLET, FILM COATED ORAL at 21:34

## 2022-05-09 RX ADMIN — ASPIRIN 81 MG: 81 TABLET, CHEWABLE ORAL at 09:05

## 2022-05-09 RX ADMIN — FAMOTIDINE 20 MG: 20 TABLET, FILM COATED ORAL at 09:05

## 2022-05-09 RX ADMIN — HEPARIN SODIUM 5000 UNITS: 5000 INJECTION INTRAVENOUS; SUBCUTANEOUS at 21:34

## 2022-05-09 RX ADMIN — ATORVASTATIN CALCIUM 40 MG: 40 TABLET, FILM COATED ORAL at 21:34

## 2022-05-09 RX ADMIN — HEPARIN SODIUM 5000 UNITS: 5000 INJECTION INTRAVENOUS; SUBCUTANEOUS at 09:07

## 2022-05-09 RX ADMIN — FERROUS SULFATE TAB 325 MG (65 MG ELEMENTAL FE) 325 MG: 325 (65 FE) TAB at 17:24

## 2022-05-09 RX ADMIN — HYDROXYZINE HYDROCHLORIDE 25 MG: 25 TABLET ORAL at 21:34

## 2022-05-09 RX ADMIN — FERROUS SULFATE TAB 325 MG (65 MG ELEMENTAL FE) 325 MG: 325 (65 FE) TAB at 09:04

## 2022-05-09 RX ADMIN — ONDANSETRON HYDROCHLORIDE 4 MG: 4 TABLET, FILM COATED ORAL at 09:04

## 2022-05-09 NOTE — SIGNIFICANT NOTE
05/09/22 1607   Plan   Plan Contacted [facility] [phone] per Aruna who says pt's new wheelchair with adjustable arms has not came in and says to check back in 1-2 days; they do not have sliding boards at this time.  Left message for daughter [phone] to bring pt's W/C to rehab on 5-12-22 so he will have it for transport via [facility] W/C van at discharge.

## 2022-05-09 NOTE — PROGRESS NOTES
Rehabilitation Nursing  Inpatient Rehabilitation Plan of Care Note    Plan of Care  Copy from Peggy    Pain Management (Active)  Current Status (4/25/2022 3:25:00 PM): Potential for increased pain  Weekly Goal: Tolerable pain level  Discharge Goal: No pain    Body Function Structure    Skin Integrity (Active)  Current Status (4/25/2022 3:25:00 PM): Impaired skin integrity  Weekly Goal: No more skin breakdown/improvement of skin integrity  Discharge Goal: Improved skin integrity    Safety    Potential for Injury (Active)  Current Status (4/25/2022 3:25:00 PM): Potential for falls  Weekly Goal: No falls  Discharge Goal: No falls    Signed by: Melissa Arellano Nurse

## 2022-05-09 NOTE — THERAPY TREATMENT NOTE
Inpatient Rehabilitation - Occupational Therapy Treatment Note     [place]     Patient Name: Syed Grajeda  : 1965  MRN: [id number]    Today's Date: 2022                 Admit Date: 2022         ICD-10-CM ICD-9-CM   1. S/P AKA (above knee amputation) unilateral, right (HCC)  Z89.611 V49.76       Patient Active Problem List   Diagnosis   • Cataract, nuclear sclerotic, right eye   • S/P AKA (above knee amputation) unilateral, right (HCC)       Past Medical History:   Diagnosis Date   • Arthritis    • Diabetes mellitus (CMS/HCC)    • GERD (gastroesophageal reflux disease)    • Hiatal hernia    • IBS (irritable bowel syndrome)        Past Surgical History:   Procedure Laterality Date   • APPENDECTOMY     • BELOW KNEE AMPUTATION Left    • CATARACT EXTRACTION W/ INTRAOCULAR LENS IMPLANT Right 2019    Procedure: CATARACT PHACO EXTRACTION WITH INTRAOCULAR LENS IMPLANT;  Surgeon: Tan Gonzalez MD;  Location: [facility];  Service: Ophthalmology   • TOE AMPUTATION Right     right small toe             IRF OT ASSESSMENT FLOWSHEET (last 12 hours)     IRF OT Evaluation and Treatment     Row Name 22 1025          OT Time and Intention    Document Type daily treatment  -TM     Mode of Treatment occupational therapy  -TM     Patient Effort good  -TM     Symptoms Noted During/After Treatment none  -TM     Row Name 22 1025          General Information    General Observations of Patient Pt agreeeable for therapy.  -TM     Existing Precautions/Restrictions fall;non-weight bearing;other (see comments)  NWB RLE, decreased skin integrity  -TM     Row Name 22 1025          Cognition/Psychosocial    Orientation Status (Cognition) oriented to;person;place;situation  -TM     Follows Commands (Cognition) follows one-step commands;verbal cues/prompting required  -TM     Row Name 22 1025          Lower Body Dressing    [place] Level (Lower Body Dressing) minimum assist (75% patient  effort);contact guard assist;verbal cues  -TM     Position (Lower Body Dressing) supine  -TM     Row Name 05/09/22 1025          Grooming    [place] Level (Grooming) set up  -TM     Position (Grooming) supported sitting  -TM     Row Name 05/09/22 1025          Transfers    Bed-[place]air [place] (Transfers) minimum assist (75% patient effort);2 person assist;verbal cues  -     Assistive Device (Bed-Chair Transfers) wheelchair;transfer board  -     Row Name 05/09/22 1025          Motor Skills    Motor Skills coordination;functional endurance;motor control/coordination interventions  -     Motor Control/Coordination Interventions therapeutic exercise/ROM;fine motor manipulation/dexterity activities;gross motor coordination activities;other (see comments)  BUE ther ex/act, GMC/FMC, bilateral coord  -           User Key  (r) = Recorded By, (t) = Taken By, (c) = Cosigned By    Initials Name Effective Dates     Jenny French OT 06/16/21 -                  Occupational Therapy Education                 Title: PT OT SLP Therapies (Done)     Topic: Occupational Therapy (Done)     Point: ADL training (Done)     Description:   Instruct learner(s) on proper safety adaptation and remediation techniques during self care or transfers.   Instruct in proper use of assistive devices.              Learning Progress Summary           Patient Acceptance, E,D, VU,NR by  at 5/9/2022 1025      Show all documentation for this point (11)                 Point: Precautions (Done)     Description:   Instruct learner(s) on prescribed precautions during self-care and functional transfers.              Learning Progress Summary           Patient Acceptance, E,D, VU,NR by  at 5/9/2022 1025      Show all documentation for this point (11)                             User Key     Initials Effective Dates Name Provider Type Discipline     06/16/21 -  Jenny French OT Occupational Therapist OT                     OT Recommendation and Plan    Planned Therapy Interventions (OT): activity tolerance training, adaptive equipment training, BADL retraining, IADL retraining, occupation/activity based interventions, patient/caregiver education/training, ROM/therapeutic exercise, strengthening exercise, transfer/mobility retraining                    Time Calculation:      Time Calculation- OT     Row Name 05/09/22 1452 05/09/22 1024          Time Calculation- OT    OT Start Time 1225  -TM 0810  -TM     OT Stop Time 1245  -TM 0920  -TM     OT Time Calculation (min) 20 min  -TM 70 min  -TM     Total Timed Code Minutes- OT 20 minute(s)  -TM 70 minute(s)  -TM     OT Non-Billable Time (min) -- 15 min  -TM           User Key  (r) = Recorded By, (t) = Taken By, (c) = Cosigned By    Initials Name Provider Type    TM Jenny French OT Occupational Therapist              Therapy Charges for Today     Code Description Service Date Service Provider Modifiers Qty    32179675478 HC OT SELF CARE/MGMT/TRAIN EA 15 MIN 5/9/2022 Jenny French OT GO 2    25378616183 HC OT THERAPEUTIC ACT EA 15 MIN 5/9/2022 Jenny French OT GO 1    65605110044 HC OT THER PROC EA 15 MIN 5/9/2022 Jenny French OT GO 2    69530286097 HC OT THERAPEUTIC ACT EA 15 MIN 5/9/2022 Jenny French OT GO 1                   Jenny French OT  5/9/2022

## 2022-05-09 NOTE — THERAPY TREATMENT NOTE
Inpatient Rehabilitation - Physical Therapy Treatment Note       [facility]     Patient Name: Syed Grajeda  : 1965  MRN: [id number]    Today's Date: 2022                    Admit Date: 2022      Visit Dx:     ICD-10-CM ICD-9-CM   1. S/P AKA (above knee amputation) unilateral, right (HCC)  Z89.611 V49.76       Patient Active Problem List   Diagnosis   • Cataract, nuclear sclerotic, right eye   • S/P AKA (above knee amputation) unilateral, right (HCC)       Past Medical History:   Diagnosis Date   • Arthritis    • Diabetes mellitus (CMS/HCC)    • GERD (gastroesophageal reflux disease)    • Hiatal hernia    • IBS (irritable bowel syndrome)        Past Surgical History:   Procedure Laterality Date   • APPENDECTOMY     • BELOW KNEE AMPUTATION Left    • CATARACT EXTRACTION W/ INTRAOCULAR LENS IMPLANT Right 2019    Procedure: CATARACT PHACO EXTRACTION WITH INTRAOCULAR LENS IMPLANT;  Surgeon: Tan Gonzalez MD;  Location: [facility];  Service: Ophthalmology   • TOE AMPUTATION Right     right small toe       PT ASSESSMENT (last 12 hours)     IRF PT Evaluation and Treatment     Row Name 22 1527          PT Time and Intention    Document Type daily treatment  -RG     Mode of Treatment individual therapy;physical therapy  -RG     Patient/Family/Caregiver Comments/Observations Pt and nursing in agreement for skilled PT on this date.  -RG     Row Name 22 1527          General Information    Patient Profile Reviewed yes  -RG     Existing Precautions/Restrictions fall  <skin integrity, BLE amputations  -RG     Row Name 22 1527          Pain Scale: FACES Pre/Post-Treatment    Pain: FACES Scale, Pretreatment 4-->hurts little more  -RG     Posttreatment Pain Rating 6-->hurts even more  -RG     Row Name 22 1527          Cognition/Psychosocial    Affect/Mental Status (Cognition) WFL  -RG     Follows Commands (Cognition) verbal cues/prompting required  -RG     Personal Safety  Interventions gait belt;nonskid shoes/slippers when out of bed;fall prevention program maintained  -[facility] Name 05/09/22 1527          Bed Mobility    Supine-Sit [place] (Bed Mobility) minimum assist (75% patient effort);moderate assist (50% patient effort)  -     Sit-Supine [place] (Bed Mobility) minimum assist (75% patient effort);moderate assist (50% patient effort)  -[facility] Name 05/09/22 1527          Transfers    Bed-Chair [place] (Transfers) moderate assist (50% patient effort)  -     Chair-Bed [place] (Transfers) moderate assist (50% patient effort)  -     Assistive Device (Bed-Chair Transfers) wheelchair;transfer board  -     Row Name 05/09/22 1527          Chair-Bed Transfer    Assistive Device (Chair-Bed Transfers) transfer board  -[facility] Name 05/09/22 1527          Safety Issues, Functional Mobility    Impairments Affecting Function (Mobility) balance;endurance/activity tolerance;pain;postural/trunk control;range of motion (ROM);strength  -[facility] Name 05/09/22 1527          Hip (Therapeutic Exercise)    Hip Strengthening (Therapeutic Exercise) bilateral;flexion;aBduction;aDduction;external rotation;internal rotation;marching while seated;sitting;resistance band;green;10 repetitions;2 sets  -[facility] Name 05/09/22 1527          Knee (Therapeutic Exercise)    Knee Strengthening (Therapeutic Exercise) flexion;extension;left;marching while seated;LAQ (long arc quad);sitting;supine;resistance band;green;10 repetitions;2 sets  -[facility] Name 05/09/22 1527          Positioning and Restraints    Pre-Treatment Position in bed  -     Post Treatment Position bed  -RG     In Bed notified nsg;supine;call light within reach;encouraged to call for assist  -[facility] Name 05/09/22 1527          Therapy Assessment/Plan (PT)    Patient's Goals For Discharge return home  -[facility] Name 05/09/22 1527          Therapy Assessment/Plan (PT)    Rehab Potential/Prognosis (PT)  adequate, monitor progress closely  -RG     Frequency of Treatment (PT) 5 times per week  -RG     Estimated Duration of Therapy (PT) 3 weeks  -RG     Problem List (PT) balance;mobility;range of motion (ROM);strength;pain;postural control  -RG     Activity Limitations Related to Problem List (PT) unable to ambulate safely;unable to transfer safely  -RG     Row Name 05/09/22 1527          Daily Progress Summary (PT)    Impairments Still Limiting Function (PT) flexibility decreased;functional activity tolerance impairment;pain;strength deficit  -     Row Name 05/09/22 1527          Therapy Plan Review/Discharge Plan (PT)    Anticipated Equipment Needs at Discharge (PT Eval) --  tbd  -RG     Anticipated Discharge Disposition (PT) home with home health  -     Row Name 05/09/22 1527          IRF PT Goals    Bed Mobility Goal Selection (PT-IRF) bed mobility, PT goal 1;bed mobility, PT goal 2  -RG     Transfer Goal Selection (PT-IRF) transfers, PT goal 1;transfers, PT goal 2  -RG     Balance Goal Selection (PT) balance, PT goal 1;balance, PT goal 2  -     Row Name 05/09/22 1527          Bed Mobility Goal 1 (PT-IRF)    Activity/Assistive Device (Bed Mobility Goal 1, PT-IRF) sit to supine/supine to sit  -RG     [place] Level (Bed Mobility Goal 1, PT-IRF) minimum assist (75% or more patient effort)  -RG     Time Frame (Bed Mobility Goal 1, PT-IRF) short-term goal (STG);1 week  -RG     Progress/Outcomes (Bed Mobility Goal 1, PT-IRF) goal ongoing  -     Row Name 05/09/22 1527          Bed Mobility Goal 2 (PT-IRF)    Activity/Assistive Device (Bed Mobility Goal 2, PT-IRF) sit to supine/supine to sit  -RG     [place] Level (Bed Mobility Goal 2, PT-IRF) modified independence  -RG     Time Frame (Bed Mobility Goal 2, PT-IRF) by discharge  -RG     Progress/Outcomes (Bed Mobility Goal 2, PT-IRF) goal ongoing  -     Row Name 05/09/22 1527          Transfer Goal 1 (PT-IRF)    Activity/Assistive Device (Transfer Goal  1, PT-IRF) bed-to-chair/chair-to-bed  -RG     [place] Level (Transfer Goal 1, PT-IRF) moderate assist (50-74% patient effort)  with sliding board  -RG     Time Frame (Transfer Goal 1, PT-IRF) short-term goal (STG);1 week  -RG     Progress/Outcomes (Transfer Goal 1, PT-IRF) goal ongoing  -RG     Row Name 05/09/22 1527          Transfer Goal 2 (PT-IRF)    Activity/Assistive Device (Transfer Goal 2, PT-IRF) bed-to-chair/chair-to-bed  -RG     [place] Level (Transfer Goal 2, PT-IRF) modified independence  with sliding board  -RG     Time Frame (Transfer Goal 2, PT-IRF) by discharge  -RG     Progress/Outcomes (Transfer Goal 2, PT-IRF) goal ongoing  -RG     Row Name 05/09/22 1527          Balance Goal 1 (PT)    Activity/Assistive Device (Balance Goal 1, PT) sitting, static  -RG     [place] Level/Cues Needed (Balance Goal 1, PT) contact guard assist  -RG     Time Frame (Balance Goal 1, PT) short term goal (STG);1 week  -RG     Progress/Outcomes (Balance Goal 1, PT) goal ongoing  -RG     Row Name 05/09/22 1527          Balance Goal 2 (PT)    Activity/Assistive Device (Balance Goal 2, PT) sitting, dynamic  -RG     [place] Level (Balance Goal 2, PT) conditional independence  -RG     Time Frame (Balance Goal 2, PT) by discharge  -RG     Progress/Outcome (Balance Goal 2, PT) goal ongoing  -RG           User Key  (r) = Recorded By, (t) = Taken By, (c) = Cosigned By    Initials Name Provider Type    RG Adam Goodman, GISSEL Physical Therapist Assistant              Wound 04/25/22 1525 Right lower gluteal Pressure Injury (Active)   Dressing Appearance intact;dry 05/09/22 1046   Closure UBALDO 05/08/22 1925   Base dressing in place, unable to visualize 05/09/22 1046   Periwound excoriated;redness 05/08/22 1819   Periwound Temperature warm 05/08/22 1819   Periwound Skin Turgor soft 05/08/22 1819   Edges irregular 05/08/22 1819   Care, Wound cleansed with;sterile normal saline;honey applied 05/09/22 0404   Dressing Care  dressing changed 05/09/22 0404       Wound 04/25/22 1525 Right upper gluteal Pressure Injury (Active)   Dressing Appearance dry;intact 05/09/22 1046   Closure UBALDO 05/08/22 1925   Base dressing in place, unable to visualize 05/09/22 1046   Periwound redness 05/08/22 1819   Periwound Temperature warm 05/08/22 1819   Periwound Skin Turgor soft 05/08/22 1819   Care, Wound honey applied 05/09/22 0404   Dressing Care dressing changed 05/09/22 0404       Wound 04/25/22 1525 Bilateral medial sacral spine Pressure Injury (Active)   Dressing Appearance dry;intact 05/09/22 1046   Closure None 05/08/22 1925   Base non-blanchable;red 05/09/22 0404   Periwound redness 05/08/22 1819   Periwound Temperature warm 05/08/22 1819   Periwound Skin Turgor soft 05/08/22 1819   Edges irregular 05/08/22 1819   Care, Wound honey applied 05/09/22 0404   Dressing Care dressing changed 05/09/22 0404       Wound 04/25/22 1525 Left medial gluteal Pressure Injury (Active)   Dressing Appearance dry;intact 05/09/22 1046   Closure UBALDO 05/08/22 1925   Base dressing in place, unable to visualize 05/09/22 1046   Periwound redness 05/08/22 1819   Periwound Temperature warm 05/08/22 1819   Periwound Skin Turgor soft 05/08/22 1819   Edges irregular 05/08/22 1819   Care, Wound honey applied 05/09/22 0404       Wound 04/25/22 1255 Left lateral gluteal Pressure Injury (Active)   Dressing Appearance dry;intact 05/09/22 1046   Closure None 05/08/22 1925   Base dressing in place, unable to visualize 05/09/22 1046   Periwound redness 05/08/22 1819   Periwound Temperature warm 05/08/22 1819   Periwound Skin Turgor soft 05/08/22 1819   Edges irregular 05/08/22 1819   Care, Wound honey applied 05/09/22 0404   Dressing Care dressing changed 05/09/22 0404       Wound 04/25/22 1525 Left knee Amputation stump (Active)   Dressing Appearance open to air 05/09/22 1046   Base clean 05/09/22 1046   Dressing Care open to air 05/09/22 1046       Wound 04/25/22 1525 Right knee  Amputation stump (Active)   Dressing Appearance dry;intact 05/09/22 1046   Closure Approximated;Staples 05/09/22 0404   Base clean;dry;pink 05/09/22 0404   Care, Wound cleansed with 05/09/22 0404   Dressing Care dressing changed 05/09/22 0404     Physical Therapy Education                 Title: PT OT SLP Therapies (Done)     Topic: Physical Therapy (Done)     Point: Mobility training (Done)     Learning Progress Summary           Patient Acceptance, E,D, VU,NR by  at 5/9/2022 1532      Show all documentation for this point (11)                 Point: Home exercise program (Done)     Learning Progress Summary           Patient Acceptance, E,D, VU,NR by  at 5/9/2022 1532      Show all documentation for this point (11)                 Point: Body mechanics (Done)     Learning Progress Summary           Patient Acceptance, E,D, VU,NR by  at 5/9/2022 1532      Show all documentation for this point (11)                 Point: Precautions (Done)     Learning Progress Summary           Patient Acceptance, E,D, VU,NR by  at 5/9/2022 1532      Show all documentation for this point (11)                             User Key     Initials Effective Dates Name Provider Type Discipline     06/16/21 -  Adam Goodman PTA Physical Therapist Assistant PT                PT Recommendation and Plan    Frequency of Treatment (PT): 5 times per week  Anticipated Equipment Needs at Discharge (PT Eval):  (tbd)  Daily Progress Summary (PT)  Impairments Still Limiting Function (PT): flexibility decreased, functional activity tolerance impairment, pain, strength deficit               Time Calculation:      PT Charges     Row Name 05/09/22 1533 05/09/22 1532          Time Calculation    Start Time 1245  - 1045  -     Stop Time 1330  - 1130  -     Time Calculation (min) 45 min  - 45 min  -     PT Received On 05/09/22  - 05/09/22  -            Time Calculation- PT    Total Timed Code Minutes- PT 45 minute(s)  - 45  minute(s)  -DAMARIS           User Key  (r) = Recorded By, (t) = Taken By, (c) = Cosigned By    Initials Name Provider Type    Adam Schrader PTA Physical Therapist Assistant                Therapy Charges for Today     Code Description Service Date Service Provider Modifiers Qty    77802953713 HC PT NEUROMUSC RE EDUCATION EA 15 MIN 5/9/2022 Adam Goodman PTA GP, CQ 1    37237308077 HC PT THERAPEUTIC ACT EA 15 MIN 5/9/2022 Adam Goodman PTA GP, CQ 2    51189428625 HC PT THER PROC EA 15 MIN 5/9/2022 Adam Goodman PTA GP, CQ 3                   Adam Goodman PTA  5/9/2022

## 2022-05-09 NOTE — SIGNIFICANT NOTE
05/09/22 7181   Plan   Plan SS received call from daughter Lacey.  Informed daughter about plans for pt to be discharged home on 5-13-22 and spouse needing to come to rehab for education from PT/OT and nursing for wound care.  Daughter does not work on 5-12-22 and she will bring her mother to rehab around 9:00 am on that day for education.  Pt will need [facility] lift van for transport home at discharge.  Informed pt about plans for spouse and daughter to come to rehab on 5-12-22 for education.  Will follow.   Patient/Family in Agreement with Plan yes

## 2022-05-09 NOTE — PROGRESS NOTES
Inpatient Rehabilitation Functional Measures Assessment and Plan of Care    Plan of Care    Self Care    Dressing (Lower) (Active)  Current Status (5/9/2022 8:10:00 AM): Amy/CGA  Weekly Goal: CGA  Discharge Goal: CGA    Dressing (Upper) (Resolved)  Current Status (5/2/2022 9:15:00 AM): setup  Weekly Goal: Amy  Discharge Goal: setup    Functional Measures  SAMMY Eating:  SAMMY Grooming:  SAMMY Bathing:  SAMMY Upper Body Dressing:  SAMMY Lower Body Dressing:  SAMMY Toileting:    SAMMY Bladder Management  Level of Assistance:  Frequency/Number of Accidents this Shift:    SAMMY Bowel Management  Level of Assistance:  Frequency/Number of Accidents this Shift:    SAMMY Bed/Chair/Wheelchair Transfer:  SAMMY Toilet Transfer:  SAMMY Tub/Shower Transfer:    Previously Documented Mode of Locomotion at Discharge:  [facility] Expected Mode of Locomotion at Discharge:  SAMMY Walk/Wheelchair:  SAMMY Stairs:    SAMMY Comprehension:  SAMMY Expression:  SAMMY Social Interaction:  SAMMY Problem Solving:  SAMMY Memory:    Therapy Mode Minutes  Occupational Therapy:  Physical Therapy:  Speech Language Pathology:    Discharge Functional Goals:    Signed by: Jenny French Occupational Therapist

## 2022-05-09 NOTE — PROGRESS NOTES
[facility]  PROGRESS NOTE     Patient Identification:  Name:  Syed Grajeda  Age:  57 y.o.  Sex:  male  :  1965  MRN:  [id number]  Visit Number:  [id number]  ROOM: [facility]     Primary Care Provider:  Zheng Serrato MD    Length of stay in inpatient status:  14    Subjective     Chief Compliant: Right AKA    History of Presenting Illness: 56-year-old gentleman with a history of right AKA, past history of BKA on the left, diabetes mellitus, ASCVD, sacral decubitus wound, opioid dependency, chronic pain, neuropathy.          Objective     Current Hospital Meds:aspirin, 81 mg, Oral, Daily  atorvastatin, 40 mg, Oral, Nightly  famotidine, 20 mg, Oral, Daily  ferrous sulfate, 325 mg, Oral, BID With Meals  heparin (porcine), 5,000 Units, Subcutaneous, Q12H  metoprolol tartrate, 12.5 mg, Oral, Q12H  multivitamin with minerals, 1 tablet, Oral, Daily       ----------------------------------------------------------------------------------------------------------------------  Vital Signs:  Temp:  [97.7 °F (36.5 °C)-98 °F (36.7 °C)] 97.7 °F (36.5 °C)  Heart Rate:  [80-90] 90  Resp:  [16] 16  BP: (102-105)/(56-59) 105/59  SpO2:  [96 %-98 %] 96 %  on   ;   Device (Oxygen Therapy): room air  Body mass index is 46.39 kg/m².    Wt Readings from Last 3 Encounters:   22 74.8 kg (164 lb 15.5 oz)   19 81.6 kg (180 lb)     Intake & Output (last 3 days)        0701  05/07 0700 05/ 0701  05/10 0700    P.O. 9368 861 3883 240    Total Intake(mL/kg) 1800 (24.1) 720 (9.6) 1440 (19.3) 240 (3.2)    Urine (mL/kg/hr) 980 (0.5) 600 (0.3) 450 (0.3)     Stool 0 0      Total Output 980 600 450     Net +820 +120 +990 +240            Urine Unmeasured Occurrence   2 x     Stool Unmeasured Occurrence 1 x 2 x 1 x         Diet Soft Texture; Ground; Thin; Cardiac, Consistent  Carbohydrate  ----------------------------------------------------------------------------------------------------------------------  Physical exam:  Constitutional: No acute distress, contractures but overall showing good improvement  HEENT: Normocephalic atraumatic  Neck: Supple   Cardiovascular: Regular rate and rhythm  Pulmonary/Chest: Clear to auscultation  Abdominal: Positive bowel sounds soft.   Musculoskeletal: Status post right AKA, history of remote left BKA  Neurological: No focal deficits  Skin: Sacral decubitus wound is dressed  Peripheral vascular:  Genitourinary:  ----------------------------------------------------------------------------------------------------------------------    Last echocardiogram:    ----------------------------------------------------------------------------------------------------------------------  Results from last 7 days   Lab Units 05/08/22  0059 05/03/22  1007   WBC 10*3/mm3 5.74 4.46   HEMOGLOBIN g/dL 7.7* 8.1*   HEMATOCRIT % 25.4* 26.9*   MCV fL 93.7 93.7   MCHC g/dL 30.3* 30.1*   PLATELETS 10*3/mm3 333 605*         Results from last 7 days   Lab Units 05/08/22  0059 05/03/22  1007   SODIUM mmol/L 139 139   POTASSIUM mmol/L 4.0 3.9   CHLORIDE mmol/L 106 103   CO2 mmol/L 28.8 30.4*   BUN mg/dL 13 10   CREATININE mg/dL 0.85 0.81   CALCIUM mg/dL 7.6* 7.8*   GLUCOSE mg/dL 86 96   Estimated Creatinine Clearance: 72.5 mL/min (by C-G formula based on SCr of 0.85 mg/dL).  No results found for: AMMONIA              Glucose   Date/Time Value Ref Range Status   05/09/2022 1138 88 70 - 130 mg/dL Final     Comment:     Meter: [id number] : [id number] [facility]   05/08/2022 1638 96 70 - 130 mg/dL Final     Comment:     Meter: [id number] : [id number] [facility]   05/08/2022 1113 99 70 - 130 mg/dL Final     Comment:     Meter: [id number] : [id number] [facility]   05/07/2022 1602 109 70 - 130 mg/dL Final     Comment:     Meter: [id number] : [id number]  [facility]   05/07/2022 1126 113 70 - 130 mg/dL Final     Comment:     Meter: [id number] : [id number] [facility]     No results found for: TSH, FREET4  No results found for: PREGTESTUR, PREGSERUM, HCG, HCGQUANT  Pain Management Panel    There is no flowsheet data to display.       Brief Urine Lab Results     None        No results found for: BLOODCX      No results found for: URINECX  No results found for: WOUNDCX  No results found for: STOOLCX        I have personally looked at the labs and they are summarized above.  ----------------------------------------------------------------------------------------------------------------------  Detailed radiology reports for the last 24 hours:    Imaging Results (Last 24 Hours)     ** No results found for the last 24 hours. **        Final impressions for the last 30 days of radiology reports:    CT Abdomen Pelvis Without Contrast    Result Date: 4/28/2022   1. Consolidation in the right lung and small bilateral effusions, right greater than left.  2.Thickening of the urinary bladder wall.   3. Mild degree of anasarca.  4. Moderate to large volume stool.     This report was finalized on 4/28/2022 4:59 PM by Dr. Km Adams MD.      US Liver    Result Date: 4/27/2022  Homogeneous echotexture of the liver.  This report was finalized on 4/27/2022 4:22 PM by Dr. Km Adams MD.      I have personally looked at the radiology images and read the final radiology report.    Assessment & Plan    Status post right AKA with remote history of left BKA--participated with physical and occupational therapy on 5/6/2022: Performs bed mobility activities with minimal to moderate assist; performs transfer activities with moderate assist; required minimal assist with verbal cues for lower body dressing; set up assist for grooming activities participated with coordination; functional endurance; motor control/coordination interventions medical therapeutic exercise/ROM; fine  motor manipulation/dexterity activities; gross motor coordination activities    Diabetes mellitus well controlled patient off insulin at this time    Diarrhea resolved    Thrombocytosis stable likely secondary to recent acute surgery    Anemia stable    Sacral decubitus wound continue local care    Hypertension controlled    VTE Prophylaxis:   Mechanical Order History:     None      Pharmalogical Order History:      Ordered     Dose Route Frequency Stop    04/25/22 1529  heparin (porcine) 5000 UNIT/ML injection 5,000 Units         5,000 Units SC Every 12 Hours Scheduled --              Elizabeth Mckeon MD  05/09/22  13:26 EDT

## 2022-05-10 LAB
GLUCOSE BLDC GLUCOMTR-MCNC: 100 MG/DL (ref 70–130)
GLUCOSE BLDC GLUCOMTR-MCNC: 87 MG/DL (ref 70–130)

## 2022-05-10 PROCEDURE — 25010000002 HEPARIN (PORCINE) PER 1000 UNITS: Performed by: INTERNAL MEDICINE

## 2022-05-10 PROCEDURE — 82962 GLUCOSE BLOOD TEST: CPT

## 2022-05-10 PROCEDURE — 97112 NEUROMUSCULAR REEDUCATION: CPT

## 2022-05-10 PROCEDURE — 97530 THERAPEUTIC ACTIVITIES: CPT

## 2022-05-10 PROCEDURE — 97110 THERAPEUTIC EXERCISES: CPT

## 2022-05-10 PROCEDURE — 63710000001 ONDANSETRON PER 8 MG: Performed by: INTERNAL MEDICINE

## 2022-05-10 RX ORDER — SODIUM CHLORIDE 0.9 % (FLUSH) 0.9 %
20 SYRINGE (ML) INJECTION AS NEEDED
Status: DISCONTINUED | OUTPATIENT
Start: 2022-05-10 | End: 2022-05-13 | Stop reason: HOSPADM

## 2022-05-10 RX ORDER — SODIUM CHLORIDE 0.9 % (FLUSH) 0.9 %
10 SYRINGE (ML) INJECTION AS NEEDED
Status: DISCONTINUED | OUTPATIENT
Start: 2022-05-10 | End: 2022-05-13 | Stop reason: HOSPADM

## 2022-05-10 RX ORDER — SODIUM CHLORIDE 0.9 % (FLUSH) 0.9 %
10 SYRINGE (ML) INJECTION EVERY 12 HOURS SCHEDULED
Status: DISCONTINUED | OUTPATIENT
Start: 2022-05-10 | End: 2022-05-13 | Stop reason: HOSPADM

## 2022-05-10 RX ADMIN — METOPROLOL TARTRATE 12.5 MG: 25 TABLET ORAL at 20:31

## 2022-05-10 RX ADMIN — FAMOTIDINE 20 MG: 20 TABLET, FILM COATED ORAL at 08:18

## 2022-05-10 RX ADMIN — HEPARIN SODIUM 5000 UNITS: 5000 INJECTION INTRAVENOUS; SUBCUTANEOUS at 08:19

## 2022-05-10 RX ADMIN — HEPARIN SODIUM 5000 UNITS: 5000 INJECTION INTRAVENOUS; SUBCUTANEOUS at 20:32

## 2022-05-10 RX ADMIN — Medication 10 ML: at 11:51

## 2022-05-10 RX ADMIN — Medication 10 ML: at 20:32

## 2022-05-10 RX ADMIN — FERROUS SULFATE TAB 325 MG (65 MG ELEMENTAL FE) 325 MG: 325 (65 FE) TAB at 17:49

## 2022-05-10 RX ADMIN — ATORVASTATIN CALCIUM 40 MG: 40 TABLET, FILM COATED ORAL at 20:31

## 2022-05-10 RX ADMIN — ONDANSETRON HYDROCHLORIDE 4 MG: 4 TABLET, FILM COATED ORAL at 08:22

## 2022-05-10 RX ADMIN — Medication 1 TABLET: at 08:20

## 2022-05-10 RX ADMIN — FERROUS SULFATE TAB 325 MG (65 MG ELEMENTAL FE) 325 MG: 325 (65 FE) TAB at 08:18

## 2022-05-10 RX ADMIN — METOPROLOL TARTRATE 12.5 MG: 25 TABLET ORAL at 08:19

## 2022-05-10 RX ADMIN — ASPIRIN 81 MG: 81 TABLET, CHEWABLE ORAL at 08:18

## 2022-05-10 NOTE — THERAPY RE-EVALUATION
Inpatient Rehabilitation - Physical Therapy Re-Evaluation        Jake     Patient Name: Syed Grajeda  : 1965  MRN: [id number]    Today's Date: 5/10/2022                    Admit Date: 2022      Visit Dx:     ICD-10-CM ICD-9-CM   1. S/P AKA (above knee amputation) unilateral, right (HCC)  Z89.611 V49.76       Patient Active Problem List   Diagnosis   • Cataract, nuclear sclerotic, right eye   • S/P AKA (above knee amputation) unilateral, right (HCC)       Past Medical History:   Diagnosis Date   • Arthritis    • Diabetes mellitus (CMS/HCC)    • GERD (gastroesophageal reflux disease)    • Hiatal hernia    • IBS (irritable bowel syndrome)        Past Surgical History:   Procedure Laterality Date   • APPENDECTOMY     • BELOW KNEE AMPUTATION Left    • CATARACT EXTRACTION W/ INTRAOCULAR LENS IMPLANT Right 2019    Procedure: CATARACT PHACO EXTRACTION WITH INTRAOCULAR LENS IMPLANT;  Surgeon: Tan Gonzalez MD;  Location: [facility];  Service: Ophthalmology   • TOE AMPUTATION Right     right small toe       PT ASSESSMENT (last 12 hours)     IRF PT Evaluation and Treatment     Row Name 05/10/22 1226          PT Time and Intention    Document Type re-evaluation;daily treatment  -LB     Mode of Treatment individual therapy;physical therapy  -LB     Row Name 05/10/22 1226          General Information    Existing Precautions/Restrictions fall  NWB RLE, <skin integrity  -LB     Row Name 05/10/22 1226          Pain Scale: FACES Pre/Post-Treatment    Pain: FACES Scale, Pretreatment 4-->hurts little more  -LB     Posttreatment Pain Rating 4-->hurts little more  -LB     Pain Location - --  R residual limb, buttocks/sacrum, back  -LB     Pre/Posttreatment Pain Comment rest, repositioned  -LB     Row Name 05/10/22 1226          Cognition/Psychosocial    Affect/Mental Status (Cognition) WFL  -LB     Follows Commands (Cognition) WFL  -LB     Personal Safety Interventions gait belt;supervised activity  -LB      Row Name 05/10/22 1226          Bed Mobility    Scooting/Bridging [place] (Bed Mobility) --  bridging/scooting in supine-Dep, scooting in sitting position min-mod A  -LB     Supine-Sit [place] (Bed Mobility) --  in bed with rail-min A, on mat max A  -LB     Sit-Supine [place] (Bed Mobility) minimum assist (75% patient effort);moderate assist (50% patient effort);verbal cues;nonverbal cues (demo/gesture)  -LB     Comment, (Bed Mobility) after am session he sat at EOB  -LB     Row Name 05/10/22 1226          Transfers    Bed-Chair [place] (Transfers) verbal cues;nonverbal cues (demo/gesture);minimum assist (75% patient effort)  -LB     Chair-Bed [place] (Transfers) verbal cues;nonverbal cues (demo/gesture);minimum assist (75% patient effort)  -LB     Assistive Device (Bed-Chair Transfers) transfer board;wheelchair  -LB     Row Name 05/10/22 1226          Chair-Bed Transfer    Assistive Device (Chair-Bed Transfers) transfer board;wheelchair  -LB     Row Name 05/10/22 1226          Balance    Static Sitting Balance independent  rail is available if needed at EOB  -LB     Comment, Balance sitting bean bag toss and  with reacher  -LB     Row Name 05/10/22 1226          Motor Skills    Therapeutic Exercise --  sitting ex, supine ex and BLE stretching  -LB     Row Name 05/10/22 1226          Positioning and Restraints    Pre-Treatment Position sitting in chair/recliner  -LB     In Bed call light within reach;encouraged to call for assist;sitting EOB  bid  -LB     Row Name 05/10/22 1226          Weekly Progress Summary (PT)    Functional Goal Overall Progress (PT) progressing toward functional goals as expected  -LB     Weekly Progress Summary (PT) he was initially limited with therapy due to medical issues such as hypotension and N/V but he was able to continue therapy and has now met STG's.  -LB     Impairments Still Limiting Function (PT) balance impairment;functional activity tolerance  impairment;pain;strength deficit;range of motion deficit  -LB     Recommendations (PT) He would benefit from continued therapy to maximize his functional independence prior to d/c home at d/c.  -LB     Plan for Continued Service After Discharge (PT) HH PT  -LB     Row Name 05/10/22 1226          Bed Mobility Goal 1 (PT-IRF)    Progress/Outcomes (Bed Mobility Goal 1, PT-IRF) goal met  -LB     Row Name 05/10/22 1226          Bed Mobility Goal 2 (PT-IRF)    Progress/Outcomes (Bed Mobility Goal 2, PT-IRF) goal ongoing  -LB     Row Name 05/10/22 1226          Transfer Goal 1 (PT-IRF)    Progress/Outcomes (Transfer Goal 1, PT-IRF) goal met  -LB     Row Name 05/10/22 1226          Transfer Goal 2 (PT-IRF)    Progress/Outcomes (Transfer Goal 2, PT-IRF) goal ongoing  -LB     Row Name 05/10/22 1226          Balance Goal 1 (PT)    Progress/Outcomes (Balance Goal 1, PT) goal met  -LB     Row Name 05/10/22 1226          Balance Goal 2 (PT)    Progress/Outcome (Balance Goal 2, PT) goal ongoing  -LB           User Key  (r) = Recorded By, (t) = Taken By, (c) = Cosigned By    Initials Name Provider Type    LB Molly Busby, PT Physical Therapist              Wound 04/25/22 1525 Right lower gluteal Pressure Injury (Active)   Dressing Appearance dry;intact 05/09/22 1906   Base red;non-blanchable 05/09/22 1906   Periwound pink;redness 05/09/22 1906   Periwound Temperature warm 05/09/22 1615   Periwound Skin Turgor soft 05/09/22 1615   Edges irregular 05/09/22 1615   Care, Wound cleansed with;sterile normal saline;honey applied 05/09/22 1906   Dressing Care dressing changed 05/09/22 1906       Wound 04/25/22 1525 Right upper gluteal Pressure Injury (Active)   Dressing Appearance dry;intact 05/09/22 1906   Base red;non-blanchable 05/09/22 1906   Periwound redness;pink 05/09/22 1906   Periwound Temperature warm 05/09/22 1615   Periwound Skin Turgor soft 05/09/22 1615   Care, Wound honey applied 05/09/22 9464   Dressing Care  dressing changed 05/09/22 1906       Wound 04/25/22 1525 Bilateral medial sacral spine Pressure Injury (Active)   Dressing Appearance dry;intact 05/09/22 1906   Base non-blanchable;red 05/09/22 1906   Periwound redness 05/09/22 1906   Periwound Temperature warm 05/09/22 1615   Periwound Skin Turgor soft 05/09/22 1615   Edges irregular 05/09/22 1615   Drainage Amount none 05/09/22 1615   Care, Wound honey applied 05/09/22 1906   Dressing Care dressing changed 05/09/22 1906       Wound 04/25/22 1525 Left medial gluteal Pressure Injury (Active)   Dressing Appearance dry;intact 05/09/22 1906   Base non-blanchable;red 05/09/22 1906   Periwound redness 05/09/22 1906   Periwound Temperature warm 05/09/22 1615   Periwound Skin Turgor soft 05/09/22 1615   Edges irregular 05/09/22 1615   Drainage Amount none 05/09/22 1615   Care, Wound honey applied 05/09/22 1906   Dressing Care dressing changed 05/09/22 1906       Wound 04/25/22 1255 Left lateral gluteal Pressure Injury (Active)   Dressing Appearance dry;intact 05/09/22 1906   Base non-blanchable;red 05/09/22 1906   Periwound pink;redness 05/09/22 1906   Periwound Temperature warm 05/09/22 1615   Periwound Skin Turgor soft 05/09/22 1615   Edges irregular 05/09/22 1615   Drainage Amount none 05/09/22 1615   Care, Wound honey applied 05/09/22 1906   Dressing Care dressing changed 05/09/22 1906       Wound 04/25/22 1525 Left knee Amputation stump (Active)   Dressing Appearance open to air 05/09/22 1906       Wound 04/25/22 1525 Right knee Amputation stump (Active)   Dressing Appearance dry;intact 05/10/22 0500   Closure Approximated;Staples 05/10/22 0500   Base pink;dry 05/10/22 0500   Periwound intact 05/10/22 0500   Drainage Amount none 05/10/22 0500   Care, Wound cleansed with 05/10/22 0500   Dressing Care dressing changed;gauze;elastic bandage;non-adherent 05/10/22 0500     Physical Therapy Education                 Title: PT OT SLP Therapies (Done)     Topic: Physical  Therapy (Done)     Point: Mobility training (Done)     Learning Progress Summary           Patient Acceptance, E, VU,NR by LB at 5/10/2022 1325      Show all documentation for this point (13)                 Point: Home exercise program (Done)     Learning Progress Summary           Patient Acceptance, E, VU,NR by LB at 5/10/2022 1325      Show all documentation for this point (13)                 Point: Body mechanics (Done)     Learning Progress Summary           Patient Acceptance, E, VU,NR by LB at 5/10/2022 1325      Show all documentation for this point (13)                 Point: Precautions (Done)     Learning Progress Summary           Patient Acceptance, E, VU,NR by LB at 5/10/2022 1325      Show all documentation for this point (13)                             User Key     Initials Effective Dates Name Provider Type Discipline     06/16/21 -  Molly Busby, PT Physical Therapist PT                PT Recommendation and Plan    Planned Therapy Interventions (PT): balance training, bed mobility training, home exercise program, neuromuscular re-education, patient/family education, postural re-education, ROM (range of motion), strengthening, transfer training, wheelchair management/propulsion training (pre-prosthetic training)  Frequency of Treatment (PT): 5 times per week  Anticipated Equipment Needs at Discharge (PT Eval):  (tbd)  Daily Progress Summary (PT)  Daily Progress Summary (PT): he was hypoglycemic today and could not tolerate PT.  Recommendations (PT): continue PT               Time Calculation:      PT Charges     Row Name 05/10/22 1327 05/10/22 1326          Time Calculation    Start Time 1245  -LB 0915  -LB     Stop Time 1330  -LB 1000  -LB     Time Calculation (min) 45 min  -LB 45 min  -LB     PT Received On -- 05/10/22  -LB     PT Goal Re-Cert Due Date -- 05/17/22  -LB           User Key  (r) = Recorded By, (t) = Taken By, (c) = Cosigned By    Initials Name Provider Type    LB  Molly Busby, PT Physical Therapist                Therapy Charges for Today     Code Description Service Date Service Provider Modifiers Qty    37855921487 HC PT NEUROMUSC RE EDUCATION EA 15 MIN 5/10/2022 Molly Busby, PT GP 1    29163322180 HC PT THER PROC EA 15 MIN 5/10/2022 Molly Busby, PT GP 2    35132896238 HC PT THERAPEUTIC ACT EA 15 MIN 5/10/2022 Molly Busby, PT GP 3                   Molly Busby, PT  5/10/2022

## 2022-05-10 NOTE — PROGRESS NOTES
Rehabilitation Nursing  Inpatient Rehabilitation Plan of Care Note    Plan of Care  Copy from Peggy    Pain Management (Active)  Current Status (4/25/2022 3:25:00 PM): Potential for increased pain  Weekly Goal: Tolerable pain level  Discharge Goal: No pain    Body Function Structure    Skin Integrity (Active)  Current Status (4/25/2022 3:25:00 PM): Impaired skin integrity  Weekly Goal: No more skin breakdown/improvement of skin integrity  Discharge Goal: Improved skin integrity    Safety    Potential for Injury (Active)  Current Status (4/25/2022 3:25:00 PM): Potential for falls  Weekly Goal: No falls  Discharge Goal: No falls    Signed by: Dariana Neri RN

## 2022-05-10 NOTE — THERAPY TREATMENT NOTE
Inpatient Rehabilitation - Occupational Therapy Progress Note     Jake     Patient Name: Syed Grajeda  : 1965  MRN: [id number]    Today's Date: 5/10/2022                 Admit Date: 2022         ICD-10-CM ICD-9-CM   1. S/P AKA (above knee amputation) unilateral, right (HCC)  Z89.611 V49.76       Patient Active Problem List   Diagnosis   • Cataract, nuclear sclerotic, right eye   • S/P AKA (above knee amputation) unilateral, right (HCC)       Past Medical History:   Diagnosis Date   • Arthritis    • Diabetes mellitus (CMS/HCC)    • GERD (gastroesophageal reflux disease)    • Hiatal hernia    • IBS (irritable bowel syndrome)        Past Surgical History:   Procedure Laterality Date   • APPENDECTOMY     • BELOW KNEE AMPUTATION Left    • CATARACT EXTRACTION W/ INTRAOCULAR LENS IMPLANT Right 2019    Procedure: CATARACT PHACO EXTRACTION WITH INTRAOCULAR LENS IMPLANT;  Surgeon: Tan Gonzalez MD;  Location: [facility];  Service: Ophthalmology   • TOE AMPUTATION Right     right small toe             IRF OT ASSESSMENT FLOWSHEET (last 12 hours)     IRF OT Evaluation and Treatment     Row Name 05/10/22 1433          OT Time and Intention    Document Type progress note;daily treatment  -KP     Mode of Treatment individual therapy;occupational therapy  -KP     Total Minutes, Occupational Therapy 90  -KP     Patient Effort good  -KP     Symptoms Noted During/After Treatment none  -KP     Row Name 05/10/22 1433          General Information    Patient Profile Reviewed yes  -KP     General Observations of Patient Patient agreeable to therapy with no complaints voiced.  -KP     Existing Precautions/Restrictions fall  -KP     Row Name 05/10/22 1433          Pain Assessment    Pretreatment Pain Rating 0/10 - no pain  -KP     Posttreatment Pain Rating 0/10 - no pain  -KP     Row Name 05/10/22 1433          Cognition/Psychosocial    Affect/Mental Status (Cognition) WFL  -KP     Orientation Status  (Cognition) oriented x 3  -     Follows Commands (Cognition) WFL  -     Personal Safety Interventions gait belt;supervised activity  -     Row Name 05/10/22 1433          Bathing    Position (Bathing) supine;edge of bed sitting  -     Comment (Bathing) min assist  -     Row Name 05/10/22 1433          Upper Body Dressing    [place] Level (Upper Body Dressing) set up assistance;verbal cues  -     Position (Upper Body Dressing) edge of bed sitting  -     Comment (Upper Body Dressing) setup  -     Row Name 05/10/22 1433          Lower Body Dressing    [place] Level (Lower Body Dressing) minimum assist (75% patient effort);contact guard assist;verbal cues  -     Position (Lower Body Dressing) supine  -     Row Name 05/10/22 1433          Grooming    [place] Level (Grooming) set up  -     Position (Grooming) supported sitting  -     Row Name 05/10/22 1433          Toileting    Position (Toileting) supine  -     Comment (Toileting) max a/mod a  -     Row Name 05/10/22 [facility]          Bed Mobility    Rolling Left [place] (Bed Mobility) standby assist  -     Rolling Right [place] (Bed Mobility) standby assist  -     Supine-Sit [place] (Bed Mobility) minimum assist (75% patient effort);moderate assist (50% patient effort)  -     Sit-Supine [place] (Bed Mobility) minimum assist (75% patient effort);moderate assist (50% patient effort);verbal cues;nonverbal cues (demo/gesture)  -     Assistive Device (Bed Mobility) bed rails  -     Row Name 05/10/22 1433          Transfers    Bed-Chair [place] (Transfers) verbal cues;nonverbal cues (demo/gesture);minimum assist (75% patient effort)  -     Assistive Device (Bed-Chair Transfers) transfer board;wheelchair  -     Row Name 05/10/22 1433          Motor Skills    Motor Skills coordination;functional endurance;motor control/coordination interventions  -     Functional Endurance fair  -     Motor  Control/Coordination Interventions fine motor manipulation/dexterity activities;gross motor coordination activities;occupation/activity based treatment;therapeutic exercise/ROM  tabletop functional reach FMC/GMC; dowel harpal BUE AROM X10 reps X3 sets from up in bed  -     Row Name 05/10/22 1433          Weekly Progress Summary (OT)    Overall Progress Toward Functional Goals (OT) progressing toward functional goals as expected  -           User Key  (r) = Recorded By, (t) = Taken By, (c) = Cosigned By    Initials Name Effective Dates     Liane Elizalde, OT 06/16/21 -                  Occupational Therapy Education                 Title: PT OT SLP Therapies (Done)     Topic: Occupational Therapy (Done)     Point: ADL training (Done)     Description:   Instruct learner(s) on proper safety adaptation and remediation techniques during self care or transfers.   Instruct in proper use of assistive devices.              Learning Progress Summary           Patient Acceptance, E,TB, VU by  at 5/9/2022 2333      Show all documentation for this point (12)                 Point: Precautions (Done)     Description:   Instruct learner(s) on prescribed precautions during self-care and functional transfers.              Learning Progress Summary           Patient Acceptance, E,TB, VU by  at 5/9/2022 2333      Show all documentation for this point (12)                             User Key     Initials Effective Dates Name Provider Type Discipline     06/16/21 -  Graciela Sanders, RN Registered Nurse Nurse                    OT Recommendation and Plan                         Time Calculation:      Time Calculation- OT     Row Name 05/10/22 1442 05/10/22 1441          Time Calculation- OT    OT Start Time 1415  - 0800  -     OT Stop Time 1430  - 0915  -     OT Time Calculation (min) 15 min  -KP 75 min  -     Total Timed Code Minutes- OT 15 minute(s)  -KP 75 minute(s)  -           User Key  (r) = Recorded By, (t) =  Taken By, (c) = Cosigned By    Initials Name Provider Type     Liane Elizalde OT Occupational Therapist              Therapy Charges for Today     Code Description Service Date Service Provider Modifiers Qty    [id number]  OT THERAPEUTIC ACT EA 15 MIN 5/10/2022 Liane Elizalde OT GO 3    [id number]  OT THER PROC EA 15 MIN 5/10/2022 Liane Elizalde OT GO 3                   Liane Elizalde OT  5/10/2022

## 2022-05-10 NOTE — SIGNIFICANT NOTE
05/10/22 9100   Plan   Plan Team conference held today.  Spoke to pt about plans for on 5-13-22, recommendations for home health PT, nursing, sliding board, hospital bed and drop-arm bedside commode.  Pt declines need for hospital bed at this time.  Pt is agreeable to other DME.  Informed pt [facility] was waiting for wheelchair with adjustable arms to come in and they will exchange it with the one he has at home.  Pt to remind family to bring his W/C to rehab on 5-12-22 for transport via [facility] W/C van at discharge.  Pt prefers [facility], [facility] or another provider who has the DME he needs for home.  Pt will return home with spouse assisting with caregiving and wound care.  Spouse and daughter to come to rehab for education on 5-12-22 at 9:00 am.  Will follow.   Patient/Family in Agreement with Plan yes

## 2022-05-10 NOTE — PROGRESS NOTES
Occupational Therapy:    Physical Therapy: Individual: 90 minutes.    Speech Language Pathology:    Signed by: Molly Busby, Physical Therapist

## 2022-05-10 NOTE — PROGRESS NOTES
[facility]  PROGRESS NOTE     Patient Identification:  Name:  Syed Grajeda  Age:  57 y.o.  Sex:  male  :  1965  MRN:  [id number]  Visit Number:  [id number]  ROOM: [facility]     Primary Care Provider:  Zheng Serrato MD    Length of stay in inpatient status:  15    Subjective     Chief Compliant: Right AKA    History of Presenting Illness: 56-year-old gentleman with a history of right AKA, past history of BKA on the left, diabetes mellitus, ASCVD, sacral decubitus wound, opioid dependency, chronic pain, neuropathy.      Patient had an uneventful night with no issues reported, vitals are stable with no fever documented overnight.  Patient denies any chest pain, shortness of breath, nausea, vomiting, diarrhea or headache.  Has been participating with physical therapy without any difficulty.  Seems to be fairly comfortable this morning with no evidence of acute distress.        Objective     Current Hospital Meds:aspirin, 81 mg, Oral, Daily  atorvastatin, 40 mg, Oral, Nightly  famotidine, 20 mg, Oral, Daily  ferrous sulfate, 325 mg, Oral, BID With Meals  heparin (porcine), 5,000 Units, Subcutaneous, Q12H  metoprolol tartrate, 12.5 mg, Oral, Q12H  multivitamin with minerals, 1 tablet, Oral, Daily  sodium chloride, 10 mL, Intravenous, Q12H       ----------------------------------------------------------------------------------------------------------------------  Vital Signs:  Temp:  [97.3 °F (36.3 °C)-97.9 °F (36.6 °C)] 97.9 °F (36.6 °C)  Heart Rate:  [86-88] 88  Resp:  [18] 18  BP: (109-153)/(44-72) 153/72  SpO2:  [97 %] 97 %  on   ;   Device (Oxygen Therapy): room air  Body mass index is 46.39 kg/m².    Wt Readings from Last 3 Encounters:   22 74.8 kg (164 lb 15.5 oz)   19 81.6 kg (180 lb)     Intake & Output (last 3 days)        0701   07 07 07 0701  05/10 0700 05/10 07 0700    P.O. 720 9440 1730 360    Total Intake(mL/kg) 720  (9.6) 1440 (19.3) 1080 (14.4) 360 (4.8)    Urine (mL/kg/hr) 600 (0.3) 450 (0.3) 1500 (0.8)     Stool 0  0     Total Output      Net +120 +990 -420 +360            Urine Unmeasured Occurrence  2 x      Stool Unmeasured Occurrence 2 x 1 x 1 x         Diet Soft Texture; Ground; Thin; Cardiac, Consistent Carbohydrate  ----------------------------------------------------------------------------------------------------------------------  Physical exam:  Constitutional: No acute distress noted, No complaints or issues noted.  HEENT: Normocephalic atraumatic  Neck: Supple   Cardiovascular: Regular rate and rhythm  Pulmonary/Chest: Clear to auscultation  Abdominal: Positive bowel sounds soft.   Musculoskeletal: Status post right AKA, history of remote left BKA  Neurological: No focal deficits  Skin: Sacral decubitus wound is dressed    ----------------------------------------------------------------------------------------------------------------------    Last echocardiogram:    ----------------------------------------------------------------------------------------------------------------------  Results from last 7 days   Lab Units 05/08/22  0059   WBC 10*3/mm3 5.74   HEMOGLOBIN g/dL 7.7*   HEMATOCRIT % 25.4*   MCV fL 93.7   MCHC g/dL 30.3*   PLATELETS 10*3/mm3 333         Results from last 7 days   Lab Units 05/08/22  0059   SODIUM mmol/L 139   POTASSIUM mmol/L 4.0   CHLORIDE mmol/L 106   CO2 mmol/L 28.8   BUN mg/dL 13   CREATININE mg/dL 0.85   CALCIUM mg/dL 7.6*   GLUCOSE mg/dL 86   Estimated Creatinine Clearance: 72.5 mL/min (by C-G formula based on SCr of 0.85 mg/dL).  No results found for: AMMONIA              Glucose   Date/Time Value Ref Range Status   05/09/2022 1628 84 70 - 130 mg/dL Final     Comment:     Meter: [id number] : [id number] Jerman KEYS   05/09/2022 1138 88 70 - 130 mg/dL Final     Comment:     Meter: [id number] : [id number] Gino Russo   05/08/2022 1638 96 70 - 130 mg/dL  Final     Comment:     Meter: [id number] : [id number] Gino Russo   05/08/2022 1113 99 70 - 130 mg/dL Final     Comment:     Meter: [id number] : [id number] Gino Russo   05/07/2022 1602 109 70 - 130 mg/dL Final     Comment:     Meter: [id number] : [id number] Gino Russo   05/07/2022 1126 113 70 - 130 mg/dL Final     Comment:     Meter: [id number] : [id number] Gino Russo     No results found for: TSH, FREET4  No results found for: PREGTESTUR, PREGSERUM, HCG, HCGQUANT  Pain Management Panel    There is no flowsheet data to display.       Brief Urine Lab Results     None        No results found for: BLOODCX      No results found for: URINECX  No results found for: WOUNDCX  No results found for: STOOLCX        I have personally looked at the labs and they are summarized above.  ----------------------------------------------------------------------------------------------------------------------  Detailed radiology reports for the last 24 hours:    Imaging Results (Last 24 Hours)     ** No results found for the last 24 hours. **        Final impressions for the last 30 days of radiology reports:    CT Abdomen Pelvis Without Contrast    Result Date: 4/28/2022   1. Consolidation in the right lung and small bilateral effusions, right greater than left.  2.Thickening of the urinary bladder wall.   3. Mild degree of anasarca.  4. Moderate to large volume stool.     This report was finalized on 4/28/2022 4:59 PM by Dr. Km Adams MD.      US Liver    Result Date: 4/27/2022  Homogeneous echotexture of the liver.  This report was finalized on 4/27/2022 4:22 PM by Dr. Km Adams MD.      I have personally looked at the radiology images and read the final radiology report.    Assessment & Plan    Status post right AKA with remote history of left BKA--participated with physical and occupational therapy on 5/9/2022: Performs bed mobility activities with minimal to moderate assist and  verbal/nonverbal cues; performs transfer activities with moderate assist; performs chair to bed transfer with transfer board; required minimal assist to contact-guard assist with verbal cues for lower body dressing; set up assist for grooming activity    Diabetes mellitus well controlled patient off insulin at this time    Diarrhea resolved    Thrombocytosis stable likely secondary to recent acute surgery    Anemia stable    Sacral decubitus wound continue local care    Hypertension controlled    Orthostatic hypotension-- continues to have orthostatic hypotension in the a.m. but improves throughout the day    VTE Prophylaxis:   Mechanical Order History:     None      Pharmalogical Order History:      Ordered     Dose Route Frequency Stop    04/25/22 1529  heparin (porcine) 5000 UNIT/ML injection 5,000 Units         5,000 Units SC Every 12 Hours Scheduled --              Elizabeth Mckeon MD  05/10/22  11:44 EDT

## 2022-05-11 PROCEDURE — 97530 THERAPEUTIC ACTIVITIES: CPT

## 2022-05-11 PROCEDURE — 25010000002 HEPARIN (PORCINE) PER 1000 UNITS: Performed by: INTERNAL MEDICINE

## 2022-05-11 PROCEDURE — 97110 THERAPEUTIC EXERCISES: CPT

## 2022-05-11 PROCEDURE — 97535 SELF CARE MNGMENT TRAINING: CPT

## 2022-05-11 RX ORDER — BUPRENORPHINE HYDROCHLORIDE AND NALOXONE HYDROCHLORIDE DIHYDRATE 8; 2 MG/1; MG/1
2 TABLET SUBLINGUAL DAILY
Status: DISCONTINUED | OUTPATIENT
Start: 2022-05-11 | End: 2022-05-13 | Stop reason: HOSPADM

## 2022-05-11 RX ADMIN — FAMOTIDINE 20 MG: 20 TABLET, FILM COATED ORAL at 09:07

## 2022-05-11 RX ADMIN — FERROUS SULFATE TAB 325 MG (65 MG ELEMENTAL FE) 325 MG: 325 (65 FE) TAB at 09:07

## 2022-05-11 RX ADMIN — HEPARIN SODIUM 5000 UNITS: 5000 INJECTION INTRAVENOUS; SUBCUTANEOUS at 09:07

## 2022-05-11 RX ADMIN — Medication 10 ML: at 20:39

## 2022-05-11 RX ADMIN — METOPROLOL TARTRATE 12.5 MG: 25 TABLET ORAL at 20:38

## 2022-05-11 RX ADMIN — HEPARIN SODIUM 5000 UNITS: 5000 INJECTION INTRAVENOUS; SUBCUTANEOUS at 20:39

## 2022-05-11 RX ADMIN — ATORVASTATIN CALCIUM 40 MG: 40 TABLET, FILM COATED ORAL at 20:38

## 2022-05-11 RX ADMIN — Medication 1 TABLET: at 09:07

## 2022-05-11 RX ADMIN — METOPROLOL TARTRATE 12.5 MG: 25 TABLET ORAL at 09:07

## 2022-05-11 RX ADMIN — ASPIRIN 81 MG: 81 TABLET, CHEWABLE ORAL at 09:07

## 2022-05-11 RX ADMIN — FERROUS SULFATE TAB 325 MG (65 MG ELEMENTAL FE) 325 MG: 325 (65 FE) TAB at 17:24

## 2022-05-11 RX ADMIN — Medication 10 ML: at 09:20

## 2022-05-11 RX ADMIN — BUPRENORPHINE HYDROCHLORIDE AND NALOXONE HYDROCHLORIDE DIHYDRATE 2 TABLET: 8; 2 TABLET SUBLINGUAL at 14:34

## 2022-05-11 NOTE — PROGRESS NOTES
Rehabilitation Nursing  Inpatient Rehabilitation Plan of Care Note    Plan of Care  Copy from Peggy    Pain Management (Active)  Current Status (4/25/2022 3:25:00 PM): Potential for increased pain  Weekly Goal: Tolerable pain level  Discharge Goal: No pain    Body Function Structure    Skin Integrity (Active)  Current Status (4/25/2022 3:25:00 PM): Impaired skin integrity  Weekly Goal: No more skin breakdown/improvement of skin integrity  Discharge Goal: Improved skin integrity    Safety    Potential for Injury (Active)  Current Status (4/25/2022 3:25:00 PM): Potential for falls  Weekly Goal: No falls  Discharge Goal: No falls    Signed by: Graciela Sanders, Nurse

## 2022-05-11 NOTE — SIGNIFICANT NOTE
05/11/22 1443   Plan   Plan Contacted [facility] [phone] per Dominic who states wheelchair with removeable arm rests came in and will be delivered to pt's home today.  Dominic says they do not have sliding board or drop-arm commode in stock.  Contacted [facility] [phone] per Sandrita and [facility] [phone] per Radha who do not have sliding board and drop-arm commode in stock.  Contacted [facility] [phone] per Pema about discharge on 5-13-22 and need for sliding board and drop-arm commode; pt has received DME from them in the past.  DME will be delivered to rehab today or tomorrow.  Faxed face sheet, H&P, PT/OT notes and MD progress note to [facility] via [employer].  DME orders to be faxed to [facility] when entered by MD.

## 2022-05-11 NOTE — DISCHARGE INSTR - OTHER ORDERS
[phone] 2-3 x wk x 8 wks and nursing for wound care.    [facility] [phone] for sliding board and drop-arm bedside commode.

## 2022-05-11 NOTE — SIGNIFICANT NOTE
05/11/22 8501   Plan   Plan Left message for daughter [phone] about pt's discharge on 5-13-22, reminder to bring her mother to rehab on 5-12-22 at 9:00 am for education and to bring pt's wheelchair that will be delivered to his home today by [facility].

## 2022-05-11 NOTE — PROGRESS NOTES
[facility]  PROGRESS NOTE     Patient Identification:  Name:  Syed Graejda  Age:  57 y.o.  Sex:  male  :  1965  MRN:  [id number]  Visit Number:  [id number]  ROOM: [facility]     Primary Care Provider:  Zheng Serrato MD    Length of stay in inpatient status:  16    Subjective     Chief Compliant: Right AKA    History of Presenting Illness: 56-year-old gentleman with a history of right AKA, past history of BKA on the left, diabetes mellitus, ASCVD, sacral decubitus wound, opioid dependency, chronic pain, neuropathy.      Working with occupational therapy this morning without any difficulty.  Patient denies any complaints or issues.  Pharmacy consult placed today for Suboxone dosing.        Objective     Current Hospital Meds:aspirin, 81 mg, Oral, Daily  atorvastatin, 40 mg, Oral, Nightly  famotidine, 20 mg, Oral, Daily  ferrous sulfate, 325 mg, Oral, BID With Meals  heparin (porcine), 5,000 Units, Subcutaneous, Q12H  metoprolol tartrate, 12.5 mg, Oral, Q12H  multivitamin with minerals, 1 tablet, Oral, Daily  sodium chloride, 10 mL, Intravenous, Q12H    Pharmacy Consult - Pharmacy to dose,       ----------------------------------------------------------------------------------------------------------------------  Vital Signs:  Temp:  [98.3 °F (36.8 °C)] 98.3 °F (36.8 °C)  Heart Rate:  [80] 80  Resp:  [18] 18  BP: (133)/(66) 133/66  SpO2:  [98 %] 98 %  on   ;   Device (Oxygen Therapy): room air  Body mass index is 46.39 kg/m².    Wt Readings from Last 3 Encounters:   22 74.8 kg (164 lb 15.5 oz)   19 81.6 kg (180 lb)     Intake & Output (last 3 days)        07 0700  0701  05/10 0700 05/10 07 07 07 0700    P.O. 1440 1080 1680 240    Total Intake(mL/kg) 1440 (19.3) 1080 (14.4) 1680 (22.5) 240 (3.2)    Urine (mL/kg/hr) 450 (0.3) 1500 (0.8) 625 (0.3)     Stool  0      Total Output 450 1500 625     Net +990 -420 +1055 +240             Urine Unmeasured Occurrence 2 x  4 x 1 x    Stool Unmeasured Occurrence 1 x 1 x 1 x         Diet Soft Texture; Ground; Thin; Cardiac, Consistent Carbohydrate  ----------------------------------------------------------------------------------------------------------------------  Physical exam:  Constitutional: Awake and alert, working with occupational therapy without difficulty.  No complaints or issues.  HEENT: Normocephalic atraumatic  Neck: Supple   Cardiovascular: Regular rate and rhythm  Pulmonary/Chest: Clear to auscultation  Abdominal: Positive bowel sounds soft.   Musculoskeletal: Status post right AKA, history of remote left BKA  Neurological: No focal deficits  Skin: Sacral decubitus wound is dressed    ----------------------------------------------------------------------------------------------------------------------    Last echocardiogram:    ----------------------------------------------------------------------------------------------------------------------  Results from last 7 days   Lab Units 05/08/22  0059   WBC 10*3/mm3 5.74   HEMOGLOBIN g/dL 7.7*   HEMATOCRIT % 25.4*   MCV fL 93.7   MCHC g/dL 30.3*   PLATELETS 10*3/mm3 333         Results from last 7 days   Lab Units 05/08/22  0059   SODIUM mmol/L 139   POTASSIUM mmol/L 4.0   CHLORIDE mmol/L 106   CO2 mmol/L 28.8   BUN mg/dL 13   CREATININE mg/dL 0.85   CALCIUM mg/dL 7.6*   GLUCOSE mg/dL 86   Estimated Creatinine Clearance: 72.5 mL/min (by C-G formula based on SCr of 0.85 mg/dL).  No results found for: AMMONIA              Glucose   Date/Time Value Ref Range Status   05/10/2022 1618 87 70 - 130 mg/dL Final     Comment:     Meter: [id number] : [id number] Gino Rafaela   05/10/2022 1136 100 70 - 130 mg/dL Final     Comment:     Meter: [id number] : [id number] Gino Russo   05/09/2022 1628 84 70 - 130 mg/dL Final     Comment:     Meter: [id number] : [id number] Jerman Virginia LUDMILA   05/09/2022 1138 88 70 - 130 mg/dL Final     Comment:      Meter: [id number] : [id number] Gino Russo   05/08/2022 1638 96 70 - 130 mg/dL Final     Comment:     Meter: [id number] : [id number] Gino Russo     No results found for: TSH, FREET4  No results found for: PREGTESTUR, PREGSERUM, HCG, HCGQUANT  Pain Management Panel    There is no flowsheet data to display.       Brief Urine Lab Results     None        No results found for: BLOODCX      No results found for: URINECX  No results found for: WOUNDCX  No results found for: STOOLCX        I have personally looked at the labs and they are summarized above.  ----------------------------------------------------------------------------------------------------------------------  Detailed radiology reports for the last 24 hours:    Imaging Results (Last 24 Hours)     ** No results found for the last 24 hours. **        Final impressions for the last 30 days of radiology reports:    CT Abdomen Pelvis Without Contrast    Result Date: 4/28/2022   1. Consolidation in the right lung and small bilateral effusions, right greater than left.  2.Thickening of the urinary bladder wall.   3. Mild degree of anasarca.  4. Moderate to large volume stool.     This report was finalized on 4/28/2022 4:59 PM by Dr. Km Adams MD.      US Liver    Result Date: 4/27/2022  Homogeneous echotexture of the liver.  This report was finalized on 4/27/2022 4:22 PM by Dr. Km Adams MD.      I have personally looked at the radiology images and read the final radiology report.    Assessment & Plan    Status post right AKA with remote history of left BKA--participated with physical and occupational therapy on 5/10/2022: Performs bed mobility activities with minimal to moderate assist and verbal/nonverbal cues; performs transfer activities with minimal assist and verbal/nonverbal cues; required minimal assist for bathing activity; set up assist with verbal cues for upper body dressing; minimal assist to contact-guard with verbal cues  for lower body dressing; set up assist for grooming activity; moderate to max assist for toileting    Diabetes mellitus well controlled patient off insulin at this time    Diarrhea resolved    Thrombocytosis stable likely secondary to recent acute surgery    Anemia stable    Sacral decubitus wound continue local care    Hypertension controlled    Orthostatic hypotension-- continues to have orthostatic hypotension in the a.m. but improves throughout the day    Pharmacy consult for Suboxone dosing    VTE Prophylaxis:   Mechanical Order History:     None      Pharmalogical Order History:      Ordered     Dose Route Frequency Stop    04/25/22 1529  heparin (porcine) 5000 UNIT/ML injection 5,000 Units         5,000 Units SC Every 12 Hours Scheduled --              .Elizabeth Mckeon MD  05/11/22  13:36 EDT

## 2022-05-11 NOTE — THERAPY TREATMENT NOTE
Inpatient Rehabilitation - Occupational Therapy Treatment Note    [facility]     Patient Name: Syed Grajeda  : 1965  MRN: [id number]    Today's Date: 2022                 Admit Date: 2022         ICD-10-CM ICD-9-CM   1. S/P AKA (above knee amputation) unilateral, right (HCC)  Z89.611 V49.76       Patient Active Problem List   Diagnosis   • Cataract, nuclear sclerotic, right eye   • S/P AKA (above knee amputation) unilateral, right (HCC)       Past Medical History:   Diagnosis Date   • Arthritis    • Diabetes mellitus (CMS/HCC)    • GERD (gastroesophageal reflux disease)    • Hiatal hernia    • IBS (irritable bowel syndrome)        Past Surgical History:   Procedure Laterality Date   • APPENDECTOMY     • BELOW KNEE AMPUTATION Left    • CATARACT EXTRACTION W/ INTRAOCULAR LENS IMPLANT Right 2019    Procedure: CATARACT PHACO EXTRACTION WITH INTRAOCULAR LENS IMPLANT;  Surgeon: Tan Gonzalez MD;  Location: [facility];  Service: Ophthalmology   • TOE AMPUTATION Right     right small toe             IRF OT ASSESSMENT FLOWSHEET (last 12 hours)     IRF OT Evaluation and Treatment     Row Name 22 1059          OT Time and Intention    Document Type daily treatment  -TM     Mode of Treatment occupational therapy  -TM     Patient Effort good  -TM     Symptoms Noted During/After Treatment none  -TM     Row Name 22 1059          General Information    General Observations of Patient Pt agreeable for therapy.  -TM     Existing Precautions/Restrictions fall;non-weight bearing;other (see comments)  NWB RLE, decreased skin integrity  -TM     Row Name 22 1053          Cognition/Psychosocial    Orientation Status (Cognition) oriented to;person;place;situation  -TM     Follows Commands (Cognition) follows one-step commands;verbal cues/prompting required  -TM     Row Name 22 1051          Lower Body Dressing    [place] Level (Lower Body Dressing) minimum assist (75% patient  effort);contact guard assist;verbal cues  -TM     Position (Lower Body Dressing) supine  -TM     Row Name 05/11/22 1059          Grooming    [place] Level (Grooming) set up  -TM     Position (Grooming) supported sitting  -TM     Row Name 05/11/22 1059          Transfers    Bed-Chair [place] (Transfers) minimum assist (75% patient effort);verbal cues  -TM     Chair-Bed [place] (Transfers) minimum assist (75% patient effort);verbal cues  -TM     Assistive Device (Bed-Chair Transfers) transfer board;wheelchair  -TM     Row Name 05/11/22 1059          Chair-Bed Transfer    Assistive Device (Chair-Bed Transfers) transfer board;wheelchair  -TM     Row Name 05/11/22 1059          Toilet Transfer    Comment, (Toilet Transfer) Pt reported to OT again on this date that he does not plan to utilize commode/drop arm commode at home when discharged. Pt reported he only plans to use briefs and change in his recliner chair. He reported bowel incontinence episodes.  -     Row Name 05/11/22 1059          Motor Skills    Motor Skills coordination;functional endurance;motor control/coordination interventions  -     Motor Control/Coordination Interventions therapeutic exercise/ROM;gross motor coordination activities;fine motor manipulation/dexterity activities;other (see comments)  BUE ther ex/act, GMC/FMC, bilateral coord  -           User Key  (r) = Recorded By, (t) = Taken By, (c) = Cosigned By    Initials Name Effective Dates     Jenny French Beth, OT 06/16/21 -                  Occupational Therapy Education                 Title: PT OT SLP Therapies (Done)     Topic: Occupational Therapy (Done)     Point: ADL training (Done)     Description:   Instruct learner(s) on proper safety adaptation and remediation techniques during self care or transfers.   Instruct in proper use of assistive devices.              Learning Progress Summary           Patient Acceptance, E,D, VU,NR by TM at 5/11/2022 1056       Show all documentation for this point (14)                 Point: Precautions (Done)     Description:   Instruct learner(s) on prescribed precautions during self-care and functional transfers.              Learning Progress Summary           Patient Acceptance, E,D, VU,NR by  at 5/11/2022 1058      Show all documentation for this point (14)                             User Key     Initials Effective Dates Name Provider Type Discipline     06/16/21 -  Jenny French OT Occupational Therapist OT                    OT Recommendation and Plan    Planned Therapy Interventions (OT): activity tolerance training, adaptive equipment training, BADL retraining, IADL retraining, occupation/activity based interventions, patient/caregiver education/training, ROM/therapeutic exercise, strengthening exercise, transfer/mobility retraining                    Time Calculation:      Time Calculation- OT     Row Name 05/11/22 1426 05/11/22 1058          Time Calculation- OT    OT Start Time 1330  -TM 0810  -TM     OT Stop Time 1400  -TM 0920  -TM     OT Time Calculation (min) 30 min  -TM 70 min  -TM     Total Timed Code Minutes- OT 30 minute(s)  -TM 70 minute(s)  -TM     OT Non-Billable Time (min) -- 15 min  -TM           User Key  (r) = Recorded By, (t) = Taken By, (c) = Cosigned By    Initials Name Provider Type     Jenny French OT Occupational Therapist              Therapy Charges for Today     Code Description Service Date Service Provider Modifiers Qty    86967657210 HC OT SELF CARE/MGMT/TRAIN EA 15 MIN 5/11/2022 Jenny French, OT GO 2    12448677480 HC OT THERAPEUTIC ACT EA 15 MIN 5/11/2022 Jenny French OT GO 1    89892131625 HC OT THER PROC EA 15 MIN 5/11/2022 Jenny French, OT GO 2    30560127328 HC OT THERAPEUTIC ACT EA 15 MIN 5/11/2022 Jenny French, OT GO 2                   Jenny French OT  5/11/2022

## 2022-05-11 NOTE — THERAPY TREATMENT NOTE
Inpatient Rehabilitation - Physical Therapy Treatment Note        [facility]     Patient Name: Syed Grajeda  : 1965  MRN: [id number]    Today's Date: 2022                    Admit Date: 2022      Visit Dx:     ICD-10-CM ICD-9-CM   1. S/P AKA (above knee amputation) unilateral, right (HCC)  Z89.611 V49.76       Patient Active Problem List   Diagnosis   • Cataract, nuclear sclerotic, right eye   • S/P AKA (above knee amputation) unilateral, right (HCC)       Past Medical History:   Diagnosis Date   • Arthritis    • Diabetes mellitus (CMS/HCC)    • GERD (gastroesophageal reflux disease)    • Hiatal hernia    • IBS (irritable bowel syndrome)        Past Surgical History:   Procedure Laterality Date   • APPENDECTOMY     • BELOW KNEE AMPUTATION Left    • CATARACT EXTRACTION W/ INTRAOCULAR LENS IMPLANT Right 2019    Procedure: CATARACT PHACO EXTRACTION WITH INTRAOCULAR LENS IMPLANT;  Surgeon: Tan Gonzalez MD;  Location: [facility];  Service: Ophthalmology   • TOE AMPUTATION Right     right small toe       PT ASSESSMENT (last 12 hours)     IRF PT Evaluation and Treatment     Row Name 22 1439          PT Time and Intention    Document Type daily treatment  -LB     Mode of Treatment individual therapy;physical therapy  -LB     Row Name 22 1439          General Information    Existing Precautions/Restrictions fall  NWB RLE, <skin integrity  -LB     Row Name 22 143          Pain Scale: FACES Pre/Post-Treatment    Pain: FACES Scale, Pretreatment 6-->hurts even more  -LB     Posttreatment Pain Rating 6-->hurts even more  -LB     Pain Location - back  -LB     Pre/Posttreatment Pain Comment rest, reposition  -LB     Row Name 22 1439          Cognition/Psychosocial    Affect/Mental Status (Cognition) WFL  -LB     Follows Commands (Cognition) WFL  -LB     Personal Safety Interventions gait belt;nonskid shoes/slippers when out of bed;supervised activity  -LB     Row Name  05/11/22 1439          Bed Mobility    Supine-Sit-Supine [place] (Bed Mobility) standby assist;verbal cues;nonverbal cues (demo/gesture)  -LB     Assistive Device (Bed Mobility) bed rails  -LB     Row Name 05/11/22 1439          Transfers    Bed-Chair [place] (Transfers) verbal cues;nonverbal cues (demo/gesture);minimum assist (75% patient effort);moderate assist (50% patient effort)  -LB     Chair-Bed [place] (Transfers) verbal cues;nonverbal cues (demo/gesture);minimum assist (75% patient effort);moderate assist (50% patient effort)  -LB     Assistive Device (Bed-Chair Transfers) transfer board;wheelchair  -LB     Row Name 05/11/22 1439          Chair-Bed Transfer    Assistive Device (Chair-Bed Transfers) transfer board;wheelchair  -LB     Row Name 05/11/22 1439          Balance    Comment, Balance sitting bean bag toss, sitting trunk support ex in w/c with ball  -LB     Row Name 05/11/22 1439          Motor Skills    Therapeutic Exercise --  sitting ex bid  -LB     Row Name 05/11/22 1439          Positioning and Restraints    Pre-Treatment Position --  am-w/c, pm-bed  -LB     In Bed call light within reach;encouraged to call for assist  am  -LB     In Wheelchair with OT  pm  -LB     Row Name 05/11/22 1439          Daily Progress Summary (PT)    Recommendations (PT) continue PT  -LB     Row Name 05/11/22 1439          Bed Mobility Goal 1 (PT-IRF)    Progress/Outcomes (Bed Mobility Goal 1, PT-IRF) goal met  -LB     Row Name 05/11/22 1439          Bed Mobility Goal 2 (PT-IRF)    Progress/Outcomes (Bed Mobility Goal 2, PT-IRF) goal ongoing  -LB     Row Name 05/11/22 1439          Transfer Goal 1 (PT-IRF)    Progress/Outcomes (Transfer Goal 1, PT-IRF) goal met  -LB     Row Name 05/11/22 1439          Transfer Goal 2 (PT-IRF)    Progress/Outcomes (Transfer Goal 2, PT-IRF) goal ongoing  -LB     Row Name 05/11/22 1439          Balance Goal 1 (PT)    Progress/Outcomes (Balance Goal 1, PT) goal met  -LB      Row Name 05/11/22 1439          Balance Goal 2 (PT)    Progress/Outcome (Balance Goal 2, PT) goal ongoing  -LB           User Key  (r) = Recorded By, (t) = Taken By, (c) = Cosigned By    Initials Name Provider Type    Molly Brock, PT Physical Therapist              Wound 04/25/22 1525 Right lower gluteal Pressure Injury (Active)   Dressing Appearance dry;intact 05/11/22 0800   Base red;non-blanchable 05/11/22 0500   Periwound pink;redness 05/11/22 0500   Periwound Temperature warm 05/10/22 1828   Periwound Skin Turgor soft 05/10/22 1828   Edges irregular 05/10/22 1828   Care, Wound honey applied 05/11/22 0500   Dressing Care dressing changed 05/11/22 0500       Wound 04/25/22 1525 Right upper gluteal Pressure Injury (Active)   Dressing Appearance dry;intact 05/11/22 0800   Base red;non-blanchable 05/11/22 0500   Periwound redness;pink 05/10/22 1908   Drainage Amount none 05/11/22 0800   Care, Wound honey applied 05/11/22 0500   Dressing Care dressing changed 05/11/22 0500       Wound 04/25/22 1525 Bilateral medial sacral spine Pressure Injury (Active)   Dressing Appearance dry;intact 05/11/22 0800   Base non-blanchable;red 05/11/22 0500   Periwound redness 05/10/22 1908   Drainage Amount none 05/11/22 0800   Care, Wound honey applied 05/11/22 0500   Dressing Care dressing changed 05/11/22 0500       Wound 04/25/22 1525 Left medial gluteal Pressure Injury (Active)   Dressing Appearance dry;intact 05/11/22 0800   Base non-blanchable;red 05/11/22 0500   Periwound redness 05/11/22 0500   Drainage Amount none 05/11/22 0800   Care, Wound honey applied 05/11/22 0500   Dressing Care dressing changed 05/11/22 0500       Wound 04/25/22 1255 Left lateral gluteal Pressure Injury (Active)   Dressing Appearance dry;intact 05/11/22 0800   Base non-blanchable;red 05/11/22 0500   Periwound pink;redness 05/11/22 0500   Drainage Amount none 05/11/22 0800   Care, Wound honey applied 05/11/22 0500   Dressing Care dressing  changed 05/11/22 0500       Wound 04/25/22 1525 Left knee Amputation stump (Active)   Dressing Appearance open to air 05/11/22 0800   Base clean 05/10/22 1908   Dressing Care open to air 05/11/22 0800       Wound 04/25/22 1525 Right knee Amputation stump (Active)   Dressing Appearance dry;intact 05/11/22 0800   Closure Approximated;Staples 05/11/22 0500   Base pink;dry 05/11/22 0500   Drainage Amount none 05/11/22 0500   Care, Wound cleansed with 05/11/22 0500   Dressing Care dressing changed 05/11/22 0500     Physical Therapy Education                 Title: PT OT SLP Therapies (Done)     Topic: Physical Therapy (Done)     Point: Mobility training (Done)     Learning Progress Summary           Patient Acceptance, E, VU,NR by LB at 5/11/2022 1442      Show all documentation for this point (15)                 Point: Home exercise program (Done)     Learning Progress Summary           Patient Acceptance, E, VU,NR by LB at 5/11/2022 1442      Show all documentation for this point (15)                 Point: Body mechanics (Done)     Learning Progress Summary           Patient Acceptance, E, VU,NR by LB at 5/11/2022 1442      Show all documentation for this point (15)                 Point: Precautions (Done)     Learning Progress Summary           Patient Acceptance, E, VU,NR by LB at 5/11/2022 1442      Show all documentation for this point (15)                             User Key     Initials Effective Dates Name Provider Type Discipline     06/16/21 -  Molly Busby, PT Physical Therapist PT                PT Recommendation and Plan    Planned Therapy Interventions (PT): balance training, bed mobility training, home exercise program, neuromuscular re-education, patient/family education, postural re-education, ROM (range of motion), strengthening, transfer training, wheelchair management/propulsion training (pre-prosthetic training)  Frequency of Treatment (PT): 5 times per week  Anticipated Equipment  Needs at Discharge (PT Eval):  (tbd)  Daily Progress Summary (PT)  Daily Progress Summary (PT): he was hypoglycemic today and could not tolerate PT.  Recommendations (PT): continue PT               Time Calculation:      PT Charges     Row Name 05/11/22 1443 05/11/22 1442          Time Calculation    Start Time 1245  -LB 1045  -LB     Stop Time 1330  -LB 1130  -LB     Time Calculation (min) 45 min  -LB 45 min  -LB     PT Received On -- 05/11/22  -LB           User Key  (r) = Recorded By, (t) = Taken By, (c) = Cosigned By    Initials Name Provider Type    Molly Brokc, PT Physical Therapist                Therapy Charges for Today     Code Description Service Date Service Provider Modifiers Qty    69748344000 HC PT NEUROMUSC RE EDUCATION EA 15 MIN 5/10/2022 Molly Busby, PT GP 1    97936530713 HC PT THER PROC EA 15 MIN 5/10/2022 Molly Busby, PT GP 2    79040991906 HC PT THERAPEUTIC ACT EA 15 MIN 5/10/2022 Molly Busby, PT GP 3    39935359195 HC PT THER PROC EA 15 MIN 5/11/2022 Molly Busby, PT GP 3    33313023627 HC PT THERAPEUTIC ACT EA 15 MIN 5/11/2022 Molly Busby, PT GP 3                   Molly Busby, PT  5/11/2022

## 2022-05-11 NOTE — SIGNIFICANT NOTE
05/11/22 1045   Plan   Plan Contacted [facility] [phone] per preference per Roz with referral, discharge on 5-13-22, and need for nursing evaluation for wound care to right AKA:  clean with normal saline, apply adaptic, 4x4's, divine, ace wrap daily, wound care to coccyx, sacrum, bilateral buttocks, scrotum: clean with normal saline, apply therahoney, cover with 3 large mepilex to cover entire wound, PT 2-3 x wk x 8 wks for strengthening, endurance, transfer training, balance, therapeutic exercise, bed mobility, home safety, OT 2-3 x wk x 8 wks for ADL re-training, home safety, functional mobility, and strengthening.  Faxed face sheet, H&P, PT/OT notes, and MD progress note to  via epic.   to be contacted at discharge.  Discharge summary and ambulatory referral for home health with face to face to be sent to  when available.

## 2022-05-12 LAB
GLUCOSE BLDC GLUCOMTR-MCNC: 101 MG/DL (ref 70–130)
GLUCOSE BLDC GLUCOMTR-MCNC: 108 MG/DL (ref 70–130)
GLUCOSE BLDC GLUCOMTR-MCNC: 77 MG/DL (ref 70–130)

## 2022-05-12 PROCEDURE — 97110 THERAPEUTIC EXERCISES: CPT

## 2022-05-12 PROCEDURE — 25010000002 HEPARIN (PORCINE) PER 1000 UNITS: Performed by: INTERNAL MEDICINE

## 2022-05-12 PROCEDURE — 97530 THERAPEUTIC ACTIVITIES: CPT

## 2022-05-12 PROCEDURE — 97535 SELF CARE MNGMENT TRAINING: CPT

## 2022-05-12 PROCEDURE — 82962 GLUCOSE BLOOD TEST: CPT

## 2022-05-12 RX ADMIN — Medication 10 ML: at 08:18

## 2022-05-12 RX ADMIN — BUPRENORPHINE HYDROCHLORIDE AND NALOXONE HYDROCHLORIDE DIHYDRATE 2 TABLET: 8; 2 TABLET SUBLINGUAL at 08:18

## 2022-05-12 RX ADMIN — HEPARIN SODIUM 5000 UNITS: 5000 INJECTION INTRAVENOUS; SUBCUTANEOUS at 08:18

## 2022-05-12 RX ADMIN — ASPIRIN 81 MG: 81 TABLET, CHEWABLE ORAL at 08:18

## 2022-05-12 RX ADMIN — FERROUS SULFATE TAB 325 MG (65 MG ELEMENTAL FE) 325 MG: 325 (65 FE) TAB at 17:17

## 2022-05-12 RX ADMIN — FAMOTIDINE 20 MG: 20 TABLET, FILM COATED ORAL at 08:18

## 2022-05-12 RX ADMIN — Medication 10 ML: at 21:26

## 2022-05-12 RX ADMIN — ATORVASTATIN CALCIUM 40 MG: 40 TABLET, FILM COATED ORAL at 21:25

## 2022-05-12 RX ADMIN — HEPARIN SODIUM 5000 UNITS: 5000 INJECTION INTRAVENOUS; SUBCUTANEOUS at 21:25

## 2022-05-12 RX ADMIN — METOPROLOL TARTRATE 12.5 MG: 25 TABLET ORAL at 08:18

## 2022-05-12 RX ADMIN — Medication 1 TABLET: at 08:18

## 2022-05-12 RX ADMIN — FERROUS SULFATE TAB 325 MG (65 MG ELEMENTAL FE) 325 MG: 325 (65 FE) TAB at 08:18

## 2022-05-12 NOTE — THERAPY TREATMENT NOTE
Inpatient Rehabilitation - Physical Therapy Treatment Note       Psychiatric     Patient Name: Syed Grajeda  : 1965  MRN: [id number]    Today's Date: 2022                    Admit Date: 2022      Visit Dx:     ICD-10-CM ICD-9-CM   1. S/P AKA (above knee amputation) unilateral, right (HCC)  Z89.611 V49.76       Patient Active Problem List   Diagnosis   • Cataract, nuclear sclerotic, right eye   • S/P AKA (above knee amputation) unilateral, right (HCC)       Past Medical History:   Diagnosis Date   • Arthritis    • Diabetes mellitus (CMS/HCC)    • GERD (gastroesophageal reflux disease)    • Hiatal hernia    • IBS (irritable bowel syndrome)        Past Surgical History:   Procedure Laterality Date   • APPENDECTOMY     • BELOW KNEE AMPUTATION Left    • CATARACT EXTRACTION W/ INTRAOCULAR LENS IMPLANT Right 2019    Procedure: CATARACT PHACO EXTRACTION WITH INTRAOCULAR LENS IMPLANT;  Surgeon: Tan Gonzalez MD;  Location: [facility];  Service: Ophthalmology   • TOE AMPUTATION Right     right small toe       PT ASSESSMENT (last 12 hours)     IRF PT Evaluation and Treatment     Row Name 22 1543          PT Time and Intention    Document Type daily treatment  -RG     Mode of Treatment individual therapy;physical therapy  -RG     Patient/Family/Caregiver Comments/Observations Pt declined to get up with therapy today stating that he wasn't feeling well.  Therapist encouraged pt to get out of bed but he declined. Pts family was present in room and expressed no concerns or questions.  Therapist educated family on HEP and Home safety.  I tried to show them transfers using SB, but pt declined.  Family stated that they have been doing it for some time and was used to it.  Pt declined to perform any exercises.  Nursing was aware.  -RG     Row Name 22 1543          General Information    Existing Precautions/Restrictions fall  NWB RLE, <skin integrity  -RG     Row Name 22 1546           Pain Scale: FACES Pre/Post-Treatment    Pain: FACES Scale, Pretreatment 6-->hurts even more  -RG     Posttreatment Pain Rating 6-->hurts even more  -     Row Name 05/12/22 1543          Cognition/Psychosocial    Affect/Mental Status (Cognition) WFL  -RG     Follows Commands (Cognition) WFL  -     Row Name 05/12/22 1543          Bed Mobility    Supine-Sit-Supine [place] (Bed Mobility) standby assist;verbal cues;nonverbal cues (demo/gesture)  -RG     Assistive Device (Bed Mobility) bed rails  -RG     Comment, (Bed Mobility) Education with family.  -     Row Name 05/12/22 1543          Transfer Assessment/Treatment    Comment, (Transfers) Education with family.  -     Row Name 05/12/22 1543          Transfers    Bed-Chair [place] (Transfers) verbal cues;nonverbal cues (demo/gesture);minimum assist (75% patient effort);moderate assist (50% patient effort)  -RG     Chair-Bed [place] (Transfers) verbal cues;nonverbal cues (demo/gesture);minimum assist (75% patient effort);moderate assist (50% patient effort)  -RG     Assistive Device (Bed-Chair Transfers) transfer board;wheelchair  -     Row Name 05/12/22 1543          Chair-Bed Transfer    Assistive Device (Chair-Bed Transfers) transfer board;wheelchair  -     Row Name 05/12/22 1543          Hip (Therapeutic Exercise)    Hip Strengthening (Therapeutic Exercise) --  Education with family on HEP and all exercises pt has been participating in.  -     Row Name 05/12/22 1543          Positioning and Restraints    Pre-Treatment Position in bed  -RG     Post Treatment Position bed  -RG     In Bed notified nsg;supine;call light within reach;encouraged to call for assist;with family/caregiver  -     Row Name 05/12/22 1543          Bed Mobility Goal 1 (PT-IRF)    Progress/Outcomes (Bed Mobility Goal 1, PT-IRF) goal met  -     Row Name 05/12/22 1543          Bed Mobility Goal 2 (PT-IRF)    Progress/Outcomes (Bed Mobility Goal 2, PT-IRF) goal ongoing   -RG     Row Name 05/12/22 1543          Transfer Goal 1 (PT-IRF)    Progress/Outcomes (Transfer Goal 1, PT-IRF) goal met  -RG     Row Name 05/12/22 1543          Transfer Goal 2 (PT-IRF)    Progress/Outcomes (Transfer Goal 2, PT-IRF) goal ongoing  -RG     Row Name 05/12/22 1543          Balance Goal 1 (PT)    Progress/Outcomes (Balance Goal 1, PT) goal met  -RG     Row Name 05/12/22 1543          Balance Goal 2 (PT)    Progress/Outcome (Balance Goal 2, PT) goal ongoing  -RG           User Key  (r) = Recorded By, (t) = Taken By, (c) = Cosigned By    Initials Name Provider Type    Adam Schrader PTA Physical Therapist Assistant              Wound 04/25/22 1525 Right lower gluteal Pressure Injury (Active)   Dressing Appearance dry;intact 05/12/22 0755   Base red;non-blanchable 05/11/22 1910   Periwound pink;redness 05/11/22 1910   Care, Wound honey applied 05/11/22 1910   Dressing Care dressing changed 05/11/22 1910       Wound 04/25/22 1525 Right upper gluteal Pressure Injury (Active)   Dressing Appearance dry;intact 05/12/22 0755   Base red;non-blanchable 05/11/22 1910   Periwound redness;pink 05/11/22 1910   Drainage Amount none 05/12/22 0755   Care, Wound honey applied 05/11/22 1910   Dressing Care dressing changed 05/11/22 1910       Wound 04/25/22 1525 Bilateral medial sacral spine Pressure Injury (Active)   Dressing Appearance dry;intact 05/12/22 0755   Base non-blanchable;red 05/11/22 1910   Periwound redness 05/11/22 1910   Drainage Amount none 05/12/22 0755   Care, Wound honey applied 05/11/22 1910   Dressing Care dressing changed 05/11/22 1910       Wound 04/25/22 1525 Left medial gluteal Pressure Injury (Active)   Dressing Appearance dry;intact 05/12/22 0755   Base non-blanchable;red 05/11/22 1910   Periwound redness 05/11/22 1910   Drainage Amount none 05/12/22 0755   Care, Wound honey applied 05/11/22 1910   Dressing Care dressing changed 05/11/22 1910       Wound 04/25/22 1255 Left lateral gluteal  Pressure Injury (Active)   Dressing Appearance dry;intact 05/12/22 0755   Base non-blanchable;red 05/11/22 1910   Periwound pink;redness 05/11/22 1910   Drainage Amount none 05/12/22 0755   Care, Wound honey applied 05/11/22 1910   Dressing Care dressing changed 05/11/22 1910       Wound 04/25/22 1525 Left knee Amputation stump (Active)   Dressing Appearance open to air 05/12/22 0755   Base clean 05/11/22 1910   Dressing Care open to air 05/12/22 0755       Wound 04/25/22 1525 Right knee Amputation stump (Active)   Dressing Appearance dry;intact 05/12/22 0755   Closure Approximated;Staples 05/12/22 0436   Base pink;dry 05/12/22 0436   Drainage Amount none 05/12/22 0436   Care, Wound cleansed with 05/12/22 0436   Dressing Care dressing changed;gauze;elastic bandage;non-adherent 05/12/22 0436     Physical Therapy Education                 Title: PT OT SLP Therapies (Done)     Topic: Physical Therapy (Done)     Point: Mobility training (Done)     Learning Progress Summary           Patient Refuses, E,D, VU,NR by RG at 5/12/2022 1549   Family Refuses, E,D, VU,NR by RG at 5/12/2022 1549      Show all documentation for this point (17)                 Point: Home exercise program (Done)     Learning Progress Summary           Patient Refuses, E,D, VU,NR by RG at 5/12/2022 1549   Family Refuses, E,D, VU,NR by RG at 5/12/2022 1549      Show all documentation for this point (17)                 Point: Body mechanics (Done)     Learning Progress Summary           Patient Refuses, E,D, VU,NR by RG at 5/12/2022 1549   Family Refuses, E,D, VU,NR by RG at 5/12/2022 1549      Show all documentation for this point (17)                 Point: Precautions (Done)     Learning Progress Summary           Patient Refuses, E,D, VU,NR by RG at 5/12/2022 1549   Family Refuses, E,D, VU,NR by RG at 5/12/2022 1549      Show all documentation for this point (17)                             User Key     Initials Effective Dates Name Provider Type  Discipline     06/16/21 -  Adam Goodman PTA Physical Therapist Assistant PT                PT Recommendation and Plan    Frequency of Treatment (PT): 5 times per week  Anticipated Equipment Needs at Discharge (PT Eval):  (tbd)  Daily Progress Summary (PT)  Impairments Still Limiting Function (PT): flexibility decreased, functional activity tolerance impairment, pain, strength deficit               Time Calculation:      PT Charges     Row Name 05/12/22 1550             Time Calculation    Start Time 1000  -RG      Stop Time 1045  -RG      Time Calculation (min) 45 min  -RG      PT Received On 05/12/22  -RG              Time Calculation- PT    Total Timed Code Minutes- PT 45 minute(s)  -            User Key  (r) = Recorded By, (t) = Taken By, (c) = Cosigned By    Initials Name Provider Type     Adam Goodman PTA Physical Therapist Assistant                Therapy Charges for Today     Code Description Service Date Service Provider Modifiers Qty    55452205311 HC PT THERAPEUTIC ACT EA 15 MIN 5/12/2022 Adam Goodman PTA GP, CQ 3                   Adam Goodman PTA  5/12/2022

## 2022-05-12 NOTE — SIGNIFICANT NOTE
05/12/22 1123   Plan   Plan Faxed face sheet, H&P, PT/OT notes and MD progress note to [facility] [phone].  DME order to be faxed at discharge.

## 2022-05-12 NOTE — THERAPY TREATMENT NOTE
Inpatient Rehabilitation - Occupational Therapy Treatment Note     [place]     Patient Name: Syed Grajeda  : 1965  MRN: [id number]    Today's Date: 2022                 Admit Date: 2022         ICD-10-CM ICD-9-CM   1. S/P AKA (above knee amputation) unilateral, right (HCC)  Z89.611 V49.76       Patient Active Problem List   Diagnosis   • Cataract, nuclear sclerotic, right eye   • S/P AKA (above knee amputation) unilateral, right (HCC)       Past Medical History:   Diagnosis Date   • Arthritis    • Diabetes mellitus (CMS/HCC)    • GERD (gastroesophageal reflux disease)    • Hiatal hernia    • IBS (irritable bowel syndrome)        Past Surgical History:   Procedure Laterality Date   • APPENDECTOMY     • BELOW KNEE AMPUTATION Left    • CATARACT EXTRACTION W/ INTRAOCULAR LENS IMPLANT Right 2019    Procedure: CATARACT PHACO EXTRACTION WITH INTRAOCULAR LENS IMPLANT;  Surgeon: Tan Gonzalez MD;  Location: [facility];  Service: Ophthalmology   • TOE AMPUTATION Right     right small toe             IRF OT ASSESSMENT FLOWSHEET (last 12 hours)     IRF OT Evaluation and Treatment     Row Name 22 1513          OT Time and Intention    Document Type daily treatment  -TM     Mode of Treatment occupational therapy  -TM     Patient Effort adequate  -TM     Symptoms Noted During/After Treatment --  -TM     Row Name 22 3949          General Information    General Observations of Patient Pt agreeable to bedside therapy only secondary to pt c/o of nausea with RN aware/addressing. Pt's family present in am for pt/family training/educ with pt/wife/daughter verb understanding of all pt's ADL/transfer assistance levels. Recommended 24 hour assistance/supervision with pt/family verb understanding.  -TM     Existing Precautions/Restrictions fall;non-weight bearing;other (see comments)  NWB RLE, decreased skin integrity  -TM     Row Name 22 1677          Cognition/Psychosocial     Orientation Status (Cognition) oriented to;person;place;situation  -TM     Follows Commands (Cognition) follows one-step commands;verbal cues/prompting required  -TM     Row Name 05/12/22 1513          Grooming    [place] Level (Grooming) set up  -TM     Position (Grooming) supported sitting  -     Row Name 05/12/22 1513          Motor Skills    Motor Skills coordination;functional endurance;motor control/coordination interventions  -TM     Motor Control/Coordination Interventions therapeutic exercise/ROM;fine motor manipulation/dexterity activities;gross motor coordination activities;other (see comments)  BUE ther ex/act, GMC/FMC  -           User Key  (r) = Recorded By, (t) = Taken By, (c) = Cosigned By    Initials Name Effective Dates     Jenny Frenhc OT 06/16/21 -                  Occupational Therapy Education                 Title: PT OT SLP Therapies (Done)     Topic: Occupational Therapy (Done)     Point: ADL training (Done)     Description:   Instruct learner(s) on proper safety adaptation and remediation techniques during self care or transfers.   Instruct in proper use of assistive devices.              Learning Progress Summary           Patient Acceptance, E,D, VU,NR by  at 5/12/2022 1512   Family Acceptance, E,D, VU,NR by TM at 5/12/2022 1512      Show all documentation for this point (17)                 Point: Precautions (Done)     Description:   Instruct learner(s) on prescribed precautions during self-care and functional transfers.              Learning Progress Summary           Patient Acceptance, E,D, VU,NR by TM at 5/12/2022 1512   Family Acceptance, E,D, VU,NR by TM at 5/12/2022 1512      Show all documentation for this point (17)                             User Key     Initials Effective Dates Name Provider Type Discipline     06/16/21 -  Jenny French OT Occupational Therapist OT                    OT Recommendation and Plan    Planned Therapy  Interventions (OT): activity tolerance training, adaptive equipment training, BADL retraining, IADL retraining, occupation/activity based interventions, patient/caregiver education/training, ROM/therapeutic exercise, strengthening exercise, transfer/mobility retraining                    Time Calculation:      Time Calculation- OT     Row Name 05/12/22 1513 05/12/22 1512          Time Calculation- OT    OT Start Time 1300  -TM 1045  -TM     OT Stop Time 1420  -TM 1055  -TM     OT Time Calculation (min) 80 min  -TM 10 min  -TM     Total Timed Code Minutes- OT 80 minute(s)  -TM 10 minute(s)  -TM     OT Non-Billable Time (min) 15 min  -TM --           User Key  (r) = Recorded By, (t) = Taken By, (c) = Cosigned By    Initials Name Provider Type    TM Jenny French OT Occupational Therapist              Therapy Charges for Today     Code Description Service Date Service Provider Modifiers Qty    77315492949 HC OT SELF CARE/MGMT/TRAIN EA 15 MIN 5/11/2022 Jenny French, OT GO 2    73721498418 HC OT THERAPEUTIC ACT EA 15 MIN 5/11/2022 Jenny French, OT GO 1    76440240989 HC OT THER PROC EA 15 MIN 5/11/2022 Jenny French, OT GO 2    85167672950 HC OT THERAPEUTIC ACT EA 15 MIN 5/11/2022 Jenny French, OT GO 2    55921957899 HC OT SELF CARE/MGMT/TRAIN EA 15 MIN 5/12/2022 Jenny French OT GO 1    99381415122 HC OT THERAPEUTIC ACT EA 15 MIN 5/12/2022 Jenny French, OT GO 3    67406714646 HC OT THER PROC EA 15 MIN 5/12/2022 Jenny French, OT GO 2                   Jenny French OT  5/12/2022

## 2022-05-12 NOTE — SIGNIFICANT NOTE
05/12/22 1055   Plan   Plan Spouse and daughter are leaving.  Spouse says pt is agreeable to getting a new hospital bed.  Spouse says pt's current hospital bed is about 10 years old.  Spouse agreeable to [facility] providing hospital bed.  Contacted [facility] [phone] per Dominic about need for hospital bed and discharge on 5-13-22.  [facility] can deliver bed to pt's apartment on 5-13-22; technicians are unable to take the other hospital bed apart.  Reviewed this information with daughter [phone] who will assist with taking old hospital bed down.

## 2022-05-12 NOTE — PROGRESS NOTES
Occupational Therapy:    Physical Therapy: Individual: 45 minutes.    Speech Language Pathology:    Signed by: Adam Goodman PTA

## 2022-05-12 NOTE — PROGRESS NOTES
Case Management  Inpatient Rehabilitation Team Conference    Conference Date/Time: 5/10/2022 9:19:50 AM    Team Conference Attendees:  MD Janine Nolen SW Jessica Bill, RN,   Odette Tovar, NORMAN Busby, PT  Roz Campoverde OT    Demographics            Age: 57Y            Gender: Male    Admission Date: 4/25/2022 3:25:00 PM  Rehabilitation Diagnosis:  Right AKA  Comorbidities: A41.9 Sepsis, unspecified organism  J69.0 Pneumonitis due to inhalation of food and vomit  M86.9 Osteomyelitis, unspecified  E11.69 Type 2 diabetes mellitus with other specified complication  Z87.891 Personal history of nicotine dependence  Z79.4 Long term (current) use of insulin  L89.159 Pressure ulcer of sacral region, unspecified stage  Z89.512 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  Medical Necessity Expected Level Rationale: Fair  Intensity and Duration: an average of 3 hours/5 days per week  Medical Supervision and 24 Hour Rehab Nursing: x  Physical Therapy: x  PT Intensity/Duration: PT 1.5 hours per day/5 days per week  Occupational Therapy: x  OT Intensity/Duration: OT 1.5 hours per day/5 days per week  Social Work: x  Therapeutic Recreation: x  Updated (if changes indicated)    Anticipated Discharge Date/Estimated Length of Stay:   5-13-22      Discharge Plan of Care: Home Health Services Physical Therapy strengthening,  endurance, transfer training, balance, therapeutic exercise, bed mobility, home  safety 3 times per week for 4 weeks Hospital beds and Accessories:  Semi-electric  bed with mattress Commodes: drop-arm commode Other: Sliding board    Based on the patient's medical and functional status, their prognosis and  expected level of functional improvement is: Fair      Interdisciplinary Problem/Goals/Status  Body Function Structure    [RN] Skin Integrity(Active)  Current Status(04/25/2022): Impaired skin integrity  Weekly Goal(05/14/2022): No more skin breakdown/improvement of skin integrity  Discharge Goal: Improved skin integrity    [PT] Balance(Active)  Current Status(04/26/2022): NA  Weekly Goal(05/03/2022): static sitting balance CGA  Discharge Goal: static sitting balance MI        Mobility    [PT] Bed/Chair/Wheelchair(Active)  Current Status(04/26/2022): kya bed-w/c  Weekly Goal(05/03/2022): mod A with SB  Discharge Goal: MI with SB    [PT] Bed Mobility(Active)  Current Status(04/26/2022): max A  Weekly Goal(05/03/2022): min A  Discharge Goal: MI        Pain    [RN] Pain Management(Active)  Current Status(04/25/2022): Potential for increased pain  Weekly Goal(05/14/2022): Tolerable pain level  Discharge Goal: No pain        Safety    [RN] Potential for Injury(Active)  Current Status(04/25/2022): Potential for falls  Weekly Goal(05/14/2022): No falls  Discharge Goal: No falls        Self Care    [OT] Dressing (Upper)(Resolved)  Current Status(05/02/2022): setup  Weekly Goal(05/03/2022): Amy  Discharge Goal: setup    [OT] Dressing (Lower)(Active)  Current Status(05/09/2022): Amy/CGA  Weekly Goal(05/17/2022): CGA  Discharge Goal: CGA    Comments: Pt plans to return to his apartment at discharge with spouse assisting  with caregiving needs.  Spouse and daughter are scheduled for education with  PT/OT and nursing for wound care on 5-12-22.    Signed by: RAMIN Rojas    Physician CoSigned By: Elizabeth Mckeon 05/12/2022 05:53:49

## 2022-05-12 NOTE — SIGNIFICANT NOTE
05/12/22 1020   Plan   Plan Spoke to pt, spouse and daughter about referral to [facility] for PT, OT, nursing for wound care, sliding board and drop-arm commode from [facility] (items were delivered to pt's room).  Explained DME providers in [facility] did not have sliding board and drop-arm commode; pt was an existing pt of [facility].  Family brought pt's old wheelchair and spouse says she did not get pt's new wheelchair yesterday.  [facility] [place] [phone] per Dominic who says technician went to pt's apartment yesterday and no one was there; spouse says she was home all day but did not hear anyone at the door.  [facility] [place] will deliver new W/C with removeable arm rests after 2:00 pm today to pt's apartment and spouse will be there.  Spouse will assist with pt's wound care at home.  RN to provide education about wound care.  Pt will need [employer] W/C van to transport pt home tomorrow.  Pt will return home with spouse assisting with caregiving needs.   Patient/Family in Agreement with Plan yes

## 2022-05-12 NOTE — PROGRESS NOTES
[facility]  PROGRESS NOTE     Patient Identification:  Name:  Syed Grajeda  Age:  57 y.o.  Sex:  male  :  1965  MRN:  [id number]  Visit Number:  [id number]  ROOM: [facility]     Primary Care Provider:  Zheng Serrato MD    Length of stay in inpatient status:  17    Subjective     Chief Compliant: Right AKA    History of Presenting Illness: 56-year-old gentleman with a history of right AKA, past history of BKA on the left, diabetes mellitus, ASCVD, sacral decubitus wound, opioid dependency, chronic pain, neuropathy.      Working with occupational therapy this morning without any difficulty.  Patient denies any complaints or issues.  Pharmacy restarted suboxone.        Objective     Current Hospital Meds:aspirin, 81 mg, Oral, Daily  atorvastatin, 40 mg, Oral, Nightly  buprenorphine-naloxone, 2 tablet, Sublingual, Daily  famotidine, 20 mg, Oral, Daily  ferrous sulfate, 325 mg, Oral, BID With Meals  heparin (porcine), 5,000 Units, Subcutaneous, Q12H  metoprolol tartrate, 12.5 mg, Oral, Q12H  multivitamin with minerals, 1 tablet, Oral, Daily  sodium chloride, 10 mL, Intravenous, Q12H    Pharmacy Consult - Pharmacy to dose,       ----------------------------------------------------------------------------------------------------------------------  Vital Signs:  Temp:  [97.9 °F (36.6 °C)] 97.9 °F (36.6 °C)  Heart Rate:  [86] 86  Resp:  [18] 18  BP: (112)/(66) 112/66  SpO2:  [99 %] 99 %  on   ;   Device (Oxygen Therapy): room air  Body mass index is 46.39 kg/m².    Wt Readings from Last 3 Encounters:   22 74.8 kg (164 lb 15.5 oz)   19 81.6 kg (180 lb)     Intake & Output (last 3 days)        0701  05/10 0700 05/10 07 07 07 07 0700    P.O. 1080 1680 1320 120    Total Intake(mL/kg) 1080 (14.4) 1680 (22.5) 1320 (17.6) 120 (1.6)    Urine (mL/kg/hr) 1500 (0.8) 625 (0.3)      Stool 0       Total Output 1500 625      Net -420 +1055 +1320  +120            Urine Unmeasured Occurrence  4 x 5 x 1 x    Stool Unmeasured Occurrence 1 x 1 x 1 x         Diet Soft Texture; Ground; Thin; Cardiac, Consistent Carbohydrate  ----------------------------------------------------------------------------------------------------------------------  Physical exam:  Constitutional: Awake and alert, much improved, no complaints.  HEENT: Normocephalic atraumatic  Neck: Supple   Cardiovascular: Regular rate and rhythm  Pulmonary/Chest: Clear to auscultation  Abdominal: Positive bowel sounds soft.   Musculoskeletal: Status post right AKA, history of remote left BKA  Neurological: No focal deficits  Skin: Sacral decubitus wound is dressed    ----------------------------------------------------------------------------------------------------------------------    Last echocardiogram:    ----------------------------------------------------------------------------------------------------------------------  Results from last 7 days   Lab Units 05/08/22  0059   WBC 10*3/mm3 5.74   HEMOGLOBIN g/dL 7.7*   HEMATOCRIT % 25.4*   MCV fL 93.7   MCHC g/dL 30.3*   PLATELETS 10*3/mm3 333         Results from last 7 days   Lab Units 05/08/22  0059   SODIUM mmol/L 139   POTASSIUM mmol/L 4.0   CHLORIDE mmol/L 106   CO2 mmol/L 28.8   BUN mg/dL 13   CREATININE mg/dL 0.85   CALCIUM mg/dL 7.6*   GLUCOSE mg/dL 86   Estimated Creatinine Clearance: 72.5 mL/min (by C-G formula based on SCr of 0.85 mg/dL).  No results found for: AMMONIA              Glucose   Date/Time Value Ref Range Status   05/12/2022 0601 77 70 - 130 mg/dL Final     Comment:     Meter: [id number] : [id number] Blanchard Crystal   05/10/2022 1618 87 70 - 130 mg/dL Final     Comment:     Meter: [id number] : [id number] Gino Russo   05/10/2022 1136 100 70 - 130 mg/dL Final     Comment:     Meter: [id number] : [id number] Gino Russo   05/09/2022 1628 84 70 - 130 mg/dL Final     Comment:     Meter: [id number] : [id number]  Jerman Tabor LUDMILA   05/09/2022 1138 88 70 - 130 mg/dL Final     Comment:     Meter: [id number] : [id number] Gino Russo     No results found for: TSH, FREET4  No results found for: PREGTESTUR, PREGSERUM, HCG, HCGQUANT  Pain Management Panel    There is no flowsheet data to display.       Brief Urine Lab Results     None        No results found for: BLOODCX      No results found for: URINECX  No results found for: WOUNDCX  No results found for: STOOLCX        I have personally looked at the labs and they are summarized above.  ----------------------------------------------------------------------------------------------------------------------  Detailed radiology reports for the last 24 hours:    Imaging Results (Last 24 Hours)     ** No results found for the last 24 hours. **        Final impressions for the last 30 days of radiology reports:    CT Abdomen Pelvis Without Contrast    Result Date: 4/28/2022   1. Consolidation in the right lung and small bilateral effusions, right greater than left.  2.Thickening of the urinary bladder wall.   3. Mild degree of anasarca.  4. Moderate to large volume stool.     This report was finalized on 4/28/2022 4:59 PM by Dr. Km Adams MD.      US Liver    Result Date: 4/27/2022  Homogeneous echotexture of the liver.  This report was finalized on 4/27/2022 4:22 PM by Dr. Km Adams MD.      I have personally looked at the radiology images and read the final radiology report.    Assessment & Plan    Status post right AKA with remote history of left BKA--participated with physical and occupational therapy on 5/11/2022, performs bed mobility activities and standby assist and verbal/nonverbal cues for comfort transfer activities with minimal to moderate assist and verbal/nonverbal cues; utilizes transfer board/wheelchair for chair to bed transfer; required minimal assist to contact-guard with verbal cues for lower body dressing; set up assist for grooming    Diabetes  mellitus well controlled patient off insulin at this time    Diarrhea resolved    Thrombocytosis stable likely secondary to recent acute surgery    Anemia stable    Sacral decubitus wound continue local care    Hypertension controlled    Orthostatic hypotension-- continues to have orthostatic hypotension in the a.m. but improves throughout the day    Pharmacy consult for Suboxone dosing    VTE Prophylaxis:   Mechanical Order History:     None      Pharmalogical Order History:      Ordered     Dose Route Frequency Stop    04/25/22 1529  heparin (porcine) 5000 UNIT/ML injection 5,000 Units         5,000 Units SC Every 12 Hours Scheduled --              Elizabeth Mckeon MD  05/12/22  16:04 EDT

## 2022-05-12 NOTE — PROGRESS NOTES
Rehabilitation Nursing  Inpatient Rehabilitation Plan of Care Note    Plan of Care  Pain    Pain Management (Active)  Current Status (4/25/2022 3:25:00 PM): Potential for increased pain  Weekly Goal: Tolerable pain level  Discharge Goal: No pain    Body Function Structure    Skin Integrity (Active)  Current Status (4/25/2022 3:25:00 PM): Impaired skin integrity  Weekly Goal: No more skin breakdown/improvement of skin integrity  Discharge Goal: Improved skin integrity    Safety    Potential for Injury (Active)  Current Status (4/25/2022 3:25:00 PM): Potential for falls  Weekly Goal: No falls  Discharge Goal: No fallsC    Signed by: Marry Brooks Nurse

## 2022-05-13 VITALS
BODY MASS INDEX: 32.39 KG/M2 | HEART RATE: 82 BPM | OXYGEN SATURATION: 99 % | SYSTOLIC BLOOD PRESSURE: 112 MMHG | WEIGHT: 164.97 LBS | TEMPERATURE: 98.2 F | HEIGHT: 60 IN | RESPIRATION RATE: 20 BRPM | DIASTOLIC BLOOD PRESSURE: 64 MMHG

## 2022-05-13 LAB — GLUCOSE BLDC GLUCOMTR-MCNC: 90 MG/DL (ref 70–130)

## 2022-05-13 PROCEDURE — 82962 GLUCOSE BLOOD TEST: CPT

## 2022-05-13 PROCEDURE — 97530 THERAPEUTIC ACTIVITIES: CPT

## 2022-05-13 PROCEDURE — 97535 SELF CARE MNGMENT TRAINING: CPT

## 2022-05-13 PROCEDURE — 25010000002 HEPARIN (PORCINE) PER 1000 UNITS: Performed by: INTERNAL MEDICINE

## 2022-05-13 RX ORDER — MULTIPLE VITAMINS W/ MINERALS TAB 9MG-400MCG
1 TAB ORAL DAILY
Qty: 30 TABLET | Refills: 0 | Status: SHIPPED | OUTPATIENT
Start: 2022-05-13 | End: 2022-06-12

## 2022-05-13 RX ORDER — FAMOTIDINE 20 MG/1
20 TABLET, FILM COATED ORAL DAILY
Qty: 30 TABLET | Refills: 0 | Status: SHIPPED | OUTPATIENT
Start: 2022-05-13

## 2022-05-13 RX ORDER — ASPIRIN 81 MG/1
81 TABLET, CHEWABLE ORAL DAILY
Qty: 30 TABLET | Refills: 0 | Status: SHIPPED | OUTPATIENT
Start: 2022-05-13

## 2022-05-13 RX ADMIN — ASPIRIN 81 MG: 81 TABLET, CHEWABLE ORAL at 08:34

## 2022-05-13 RX ADMIN — Medication 1 TABLET: at 08:34

## 2022-05-13 RX ADMIN — FAMOTIDINE 20 MG: 20 TABLET, FILM COATED ORAL at 08:34

## 2022-05-13 RX ADMIN — FERROUS SULFATE TAB 325 MG (65 MG ELEMENTAL FE) 325 MG: 325 (65 FE) TAB at 08:34

## 2022-05-13 RX ADMIN — Medication 10 ML: at 09:00

## 2022-05-13 RX ADMIN — HEPARIN SODIUM 5000 UNITS: 5000 INJECTION INTRAVENOUS; SUBCUTANEOUS at 08:33

## 2022-05-13 RX ADMIN — BUPRENORPHINE HYDROCHLORIDE AND NALOXONE HYDROCHLORIDE DIHYDRATE 2 TABLET: 8; 2 TABLET SUBLINGUAL at 08:36

## 2022-05-13 RX ADMIN — METOPROLOL TARTRATE 12.5 MG: 25 TABLET ORAL at 08:33

## 2022-05-13 NOTE — THERAPY DISCHARGE NOTE
Inpatient Rehabilitation - Physical Therapy Treatment Note/Discharge   [facility]     Patient Name: Syed Grajeda  : 1965  MRN: [id number]  Today's Date: 2022                Admit Date: 2022    Visit Dx:    ICD-10-CM ICD-9-CM   1. Cataract, nuclear sclerotic, right eye  H25.11 366.16   2. S/P AKA (above knee amputation) unilateral, right (HCC)  Z89.611 V49.76     Patient Active Problem List   Diagnosis   • Cataract, nuclear sclerotic, right eye   • S/P AKA (above knee amputation) unilateral, right (HCC)     Past Medical History:   Diagnosis Date   • Arthritis    • Diabetes mellitus (CMS/HCC)    • GERD (gastroesophageal reflux disease)    • Hiatal hernia    • IBS (irritable bowel syndrome)      Past Surgical History:   Procedure Laterality Date   • APPENDECTOMY     • BELOW KNEE AMPUTATION Left    • CATARACT EXTRACTION W/ INTRAOCULAR LENS IMPLANT Right 2019    Procedure: CATARACT PHACO EXTRACTION WITH INTRAOCULAR LENS IMPLANT;  Surgeon: Tan Gonzalez MD;  [facility];  Service: Ophthalmology   • TOE AMPUTATION Right     right small toe       PT ASSESSMENT (last 12 hours)     IRF PT Evaluation and Treatment     Row Name 22 1400          PT Time and Intention    Document Type discharge evaluation  -RG     Mode of Treatment individual therapy;physical therapy  -RG     Patient/Family/Caregiver Comments/Observations Pt DC to home on this date. Pt given HEP, Home safety, and green tband.  -RG     Row Name 22 1400          General Information    Existing Precautions/Restrictions fall  NWB RLE, <skin integrity  -RG     Row Name 22 1400          Pain Scale: FACES Pre/Post-Treatment    Pain: FACES Scale, Pretreatment 6-->hurts even more  -RG     Posttreatment Pain Rating 6-->hurts even more  -RG     Row Name 22 1400          Cognition/Psychosocial    Affect/Mental Status (Cognition) WFL  -RG     Follows Commands (Cognition) WFL  -RG     Personal Safety  Interventions gait belt;nonskid shoes/slippers when out of bed;fall prevention program maintained  -     Row Name 05/13/22 1400          Bed Mobility    Supine-Sit-Supine Roberts (Bed Mobility) standby assist;verbal cues;nonverbal cues (demo/gesture)  -     Assistive Device (Bed Mobility) bed rails  -     Row Name 05/13/22 1400          Transfers    Bed-Chair Roberts (Transfers) verbal cues;nonverbal cues (demo/gesture);minimum assist (75% patient effort);moderate assist (50% patient effort)  -RG     Chair-Bed Roberts (Transfers) verbal cues;nonverbal cues (demo/gesture);minimum assist (75% patient effort);moderate assist (50% patient effort)  -RG     Assistive Device (Bed-Chair Transfers) transfer board;wheelchair  -     Row Name 05/13/22 1400          Chair-Bed Transfer    Assistive Device (Chair-Bed Transfers) transfer board;wheelchair  -     Row Name 05/13/22 1400          Bed Mobility Goal 1 (PT-IRF)    Progress/Outcomes (Bed Mobility Goal 1, PT-IRF) goal met  -     Row Name 05/13/22 1400          Bed Mobility Goal 2 (PT-IRF)    Progress/Outcomes (Bed Mobility Goal 2, PT-IRF) goal met  -     Row Name 05/13/22 1400          Transfer Goal 1 (PT-IRF)    Progress/Outcomes (Transfer Goal 1, PT-IRF) goal met  -     Row Name 05/13/22 1400          Transfer Goal 2 (PT-IRF)    Progress/Outcomes (Transfer Goal 2, PT-IRF) goal partially met  -[facility] Name 05/13/22 1400          Balance Goal 1 (PT)    Progress/Outcomes (Balance Goal 1, PT) goal met  -[facility] Name 05/13/22 1400          Balance Goal 2 (PT)    Progress/Outcome (Balance Goal 2, PT) goal partially met  -           User Key  (r) = Recorded By, (t) = Taken By, (c) = Cosigned By    Initials Name Provider Type    Adam Schrader PTA Physical Therapist Assistant                Physical Therapy Education                 Title: PT OT SLP Therapies (Done)     Topic: Physical Therapy (Done)     Point: Mobility training (Done)      Learning Progress Summary           Patient Acceptance, E,D,H, VU,NR by RG at 5/13/2022 1419   Family Refuses, E,D, VU,NR by RG at 5/12/2022 1549      Show all documentation for this point (18)                 Point: Home exercise program (Done)     Learning Progress Summary           Patient Acceptance, E,D,H, VU,NR by RG at 5/13/2022 1419   Family Refuses, E,D, VU,NR by RG at 5/12/2022 1549      Show all documentation for this point (18)                 Point: Body mechanics (Done)     Learning Progress Summary           Patient Acceptance, E,D,H, VU,NR by RG at 5/13/2022 1419   Family Refuses, E,D, VU,NR by RG at 5/12/2022 1549      Show all documentation for this point (18)                 Point: Precautions (Done)     Learning Progress Summary           Patient Acceptance, E,D,H, VU,NR by RG at 5/13/2022 1419   Family Refuses, E,D, VU,NR by RG at 5/12/2022 1549      Show all documentation for this point (18)                             User Key     Initials Effective Dates Name Provider Type Discipline     06/16/21 -  Adam Goodman PTA Physical Therapist Assistant PT                PT Recommendation and Plan        Daily Progress Summary (PT)  Impairments Still Limiting Function (PT): flexibility decreased, functional activity tolerance impairment, pain, strength deficit         Time Calculation:    PT Charges     Row Name 05/13/22 1422             Time Calculation    PT Received On 05/13/22  -              Time Calculation- PT    Total Timed Code Minutes- PT 15 minute(s)  -            User Key  (r) = Recorded By, (t) = Taken By, (c) = Cosigned By    Initials Name Provider Type     Adam Goodman PTA Physical Therapist Assistant                Therapy Charges for Today     Code Description Service Date Service Provider Modifiers Qty    00101243504 HC PT THERAPEUTIC ACT EA 15 MIN 5/12/2022 Adam Goodman PTA GP, CQ 3    03838209599 HC PT THERAPEUTIC ACT EA 15 MIN 5/13/2022 Adam Goodman PTA GP 1                PT Discharge Summary  Reason for Discharge: Discharge from facility  Outcomes Achieved: Patient able to partially acheive established goals  Discharge Destination: Home with assist    Adam Goodman, PTA  5/13/2022

## 2022-05-13 NOTE — PLAN OF CARE
Goal Outcome Evaluation:  Plan of Care Reviewed With: patient        Progress: improving  Outcome Evaluation: CONTINUE POC

## 2022-05-13 NOTE — PROGRESS NOTES
Occupational Therapy:    Physical Therapy: Individual: 15 minutes.    Speech Language Pathology:    Signed by: Adam Goodman PTA

## 2022-05-13 NOTE — SIGNIFICANT NOTE
05/13/22 1259   Plan   Plan Faxed DME order for hospital [facility] [place] [place] [phone].  Faxed DME orders for BSC [facility] [phone].  Faxed facesheet, ambulatory referral for home health, and discharge summary to [facility] [phone].

## 2022-05-13 NOTE — THERAPY DISCHARGE NOTE
Inpatient Rehabilitation - IRF Occupational Therapy Treatment Note/Discharge   Jake     Patient Name: Syed Grajeda  : 1965  MRN: [id number]  Today's Date: 2022               Admit Date: 2022       ICD-10-CM ICD-9-CM   1. Cataract, nuclear sclerotic, right eye  H25.11 366.16   2. S/P AKA (above knee amputation) unilateral, right (HCC)  Z89.611 V49.76     Patient Active Problem List   Diagnosis   • Cataract, nuclear sclerotic, right eye   • S/P AKA (above knee amputation) unilateral, right (HCC)     Past Medical History:   Diagnosis Date   • Arthritis    • Diabetes mellitus (CMS/HCC)    • GERD (gastroesophageal reflux disease)    • Hiatal hernia    • IBS (irritable bowel syndrome)      Past Surgical History:   Procedure Laterality Date   • APPENDECTOMY     • BELOW KNEE AMPUTATION Left    • CATARACT EXTRACTION W/ INTRAOCULAR LENS IMPLANT Right 2019    Procedure: CATARACT PHACO EXTRACTION WITH INTRAOCULAR LENS IMPLANT;  Surgeon: Tan Gonzalez MD;  Location: [facility];  Service: Ophthalmology   • TOE AMPUTATION Right     right small toe       IRF OT ASSESSMENT FLOWSHEET (last 12 hours)     IRF OT Evaluation and Treatment     Row Name 22 1324          OT Time and Intention    Document Type discharge evaluation  -TM     Mode of Treatment occupational therapy  -TM     Symptoms Noted During/After Treatment none  -TM     Row Name 22 1324          General Information    General Observations of Patient Pt discharged home with family on this date. Recommended 24 hour assistance and supervision with pt verb understanding. Recommended assistance/supervision with all ADLs/transfers with pt verb understanding.  -TM     Existing Precautions/Restrictions fall;non-weight bearing;other (see comments)  NWB RLE, decreased skin integrity  -TM     Row Name 22 1324          Cognition/Psychosocial    Orientation Status (Cognition) oriented to;person;place;situation  -TM     Follows  Commands (Cognition) WFL;follows one-step commands;verbal cues/prompting required  -TM     Row Name 05/13/22 1324          Bathing    Position (Bathing) supine;edge of bed sitting  -TM     Comment (Bathing) Amy/CGA; recommended sponge bathing for safety with pt verb understanding.  -TM     Row Name 05/13/22 1324          Upper Body Dressing    [place] Level (Upper Body Dressing) set up assistance  -TM     Position (Upper Body Dressing) supported sitting  -TM     Row Name 05/13/22 1324          Lower Body Dressing    Position (Lower Body Dressing) supine  -TM     Comment (Lower Body Dressing) CGA  -TM     Row Name 05/13/22 1324          Grooming    [place] Level (Grooming) set up  -TM     Position (Grooming) supported sitting  -TM     Row Name 05/13/22 1324          Toileting    Position (Toileting) supine  -TM     Comment (Toileting) modA  -TM     Row Name 05/13/22 1324          Self-Feeding    [place] Level (Self-Feeding) modified independence  -TM     Position (Self-Feeding) supported sitting  -TM     Row Name 05/13/22 1324          UB Dressing Goal 1 (OT-IRF)    Progress/Outcomes (UB Dressing Goal 1, OT-IRF) goal met  -TM     Row Name 05/13/22 1324          UB Dressing Goal 2 (OT-IRF)    Progress/Outcomes (UB Dressing Goal 2, OT-IRF) goal met  -     Row Name 05/13/22 1324          LB Dressing Goal 1 (OT-IRF)    Progress/Outcomes (LB Dressing Goal 1, OT-IRF) goal met  -     Row Name 05/13/22 1324          LB Dressing Goal 2 (OT-IRF)    Progress/Outcomes (LB Dressing Goal 2, OT-IRF) goal met  -     Row Name 05/13/22 1324          Toileting Goal 1 (OT-IRF)    Progress/Outcomes (Toileting Goal 1, OT-IRF) goal met  -TM           User Key  (r) = Recorded By, (t) = Taken By, (c) = Cosigned By    Initials Name Effective Dates    TM Jenny French, OT 06/16/21 -               Wound 04/25/22 1525 Right lower gluteal Pressure Injury (Active)   Dressing Appearance intact 05/13/22 0735   Closure  None 05/13/22 0405   Base red 05/13/22 0405   Drainage Amount none 05/13/22 0405   Care, Wound honey applied 05/13/22 0405   Dressing Care dressing changed 05/13/22 0405       Wound 04/25/22 1525 Right upper gluteal Pressure Injury (Active)   Wound Image   05/13/22 0405   Dressing Appearance intact 05/13/22 0735   Closure None 05/13/22 0405   Base pink;red;yellow 05/13/22 0405   Wound Length (cm) 2.2 cm 05/13/22 0405   Wound Width (cm) 2.5 cm 05/13/22 0405   Wound Surface Area (cm^2) 5.5 cm^2 05/13/22 0405   Drainage Amount none 05/13/22 0735   Care, Wound honey applied 05/13/22 0405   Dressing Care dressing changed 05/13/22 0405       Wound 04/25/22 1525 Bilateral medial sacral spine Pressure Injury (Active)   Wound Image   05/13/22 0405   Dressing Appearance intact 05/13/22 0735   Closure None 05/13/22 0405   Base red;yellow;other (see comments) 05/13/22 0405   Wound Length (cm) 2.5 cm 05/13/22 0405   Wound Width (cm) 3 cm 05/13/22 0405   Wound Surface Area (cm^2) 7.5 cm^2 05/13/22 0405   Drainage Amount none 05/13/22 0735   Care, Wound honey applied 05/13/22 0405   Dressing Care dressing changed 05/13/22 0405       Wound 04/25/22 1525 Left medial gluteal Pressure Injury (Active)   Wound Image   05/13/22 0405   Dressing Appearance intact 05/13/22 0735   Closure None 05/13/22 0405   Base pink;red 05/13/22 0405   Wound Length (cm) 2.5 cm 05/13/22 0405   Wound Width (cm) 1.2 cm 05/13/22 0405   Wound Surface Area (cm^2) 3 cm^2 05/13/22 0405   Drainage Amount none 05/13/22 0735   Care, Wound honey applied 05/13/22 0405   Dressing Care dressing changed 05/13/22 0405       Wound 04/25/22 1255 Left lateral gluteal Pressure Injury (Active)   Wound Image   05/13/22 0405   Dressing Appearance intact 05/13/22 0735   Closure None 05/13/22 0405   Base pink;red 05/13/22 0405   Wound Length (cm) 2.5 cm 05/13/22 0405   Wound Width (cm) 2 cm 05/13/22 0405   Wound Surface Area (cm^2) 5 cm^2 05/13/22 0405   Drainage Amount none  05/13/22 0735   Care, Wound honey applied 05/13/22 0405   Dressing Care dressing changed 05/13/22 0405       Wound 04/25/22 1525 Left knee Amputation stump (Active)   Dressing Appearance open to air 05/13/22 0735   Closure None 05/12/22 1915   Base clean;dry;other (see comments) 05/12/22 1915   Care, Wound other (see comments) 05/12/22 1915   Dressing Care open to air 05/13/22 0735       Wound 04/25/22 1525 Right knee Amputation stump (Active)   Dressing Appearance intact 05/13/22 0735   Closure Approximated;Staples 05/13/22 0405   Base clean;dry;pink 05/13/22 0605   Drainage Amount none 05/13/22 0605   Care, Wound cleansed with 05/13/22 0405   Dressing Care dressing applied 05/13/22 0605   Adhesive Closure Strips Applied 05/13/22 0605   Staples Removed Intact Yes 05/13/22 0605       Occupational Therapy Education                 Title: PT OT SLP Therapies (Done)     Topic: Occupational Therapy (Resolved)     Point: ADL training (Resolved)     Description:   Instruct learner(s) on proper safety adaptation and remediation techniques during self care or transfers.   Instruct in proper use of assistive devices.              Learning Progress Summary           Patient Acceptance, E,D, VU,NR by TM at 5/13/2022 1323   Family Acceptance, E,D, VU,NR by TM at 5/12/2022 1512      Show all documentation for this point (18)                 Point: Precautions (Resolved)     Description:   Instruct learner(s) on prescribed precautions during self-care and functional transfers.              Learning Progress Summary           Patient Acceptance, E,D, VU,NR by TM at 5/13/2022 1323   Family Acceptance, E,D, VU,NR by TM at 5/12/2022 1512      Show all documentation for this point (18)                             User Key     Initials Effective Dates Name Provider Type Discipline    TM 06/16/21 -  Jenny French OT Occupational Therapist OT                OT Recommendation and Plan  Planned Therapy Interventions (OT): activity  tolerance training, adaptive equipment training, BADL retraining, IADL retraining, occupation/activity based interventions, patient/caregiver education/training, ROM/therapeutic exercise, strengthening exercise, transfer/mobility retraining           OT IRF GOALS     Row Name 05/13/22 1324 05/04/22 1318 05/04/22 1259       UB Dressing Goal 1 (OT-IRF)    Progress/Outcomes (UB Dressing Goal 1, OT-IRF) goal met  -TM -- goal met  -TM       UB Dressing Goal 2 (OT-IRF)    Progress/Outcomes (UB Dressing Goal 2, OT-IRF) goal met  -TM -- goal met  -TM       LB Dressing Goal 1 (OT-IRF)    [place] (LB Dressing Goal 1, OT-IRF) -- contact guard required  -TM --    Time Frame (LB Dressing Goal 1, OT-IRF) -- short-term goal (STG)  -TM --    Progress/Outcomes (LB Dressing Goal 1, OT-IRF) goal met  -TM -- goal met  -TM       LB Dressing Goal 2 (OT-IRF)    Progress/Outcomes (LB Dressing Goal 2, OT-IRF) goal met  -TM -- goal met  -TM       Toileting Goal 1 (OT-IRF)    [place] Level (Toileting Goal 1, OT-IRF) -- moderate assist (50-74% patient effort)  -TM --    Progress/Outcomes (Toileting Goal 1, OT-IRF) goal met  -TM -- goal met  -TM    Time Frame (Toileting Goal 1, OT-IRF) -- short-term goal (STG)  -TM --          User Key  (r) = Recorded By, (t) = Taken By, (c) = Cosigned By    Initials Name Provider Type    TM Jenny French OT Occupational Therapist                    Time Calculation:    Time Calculation- OT     Row Name 05/13/22 1323             Time Calculation- OT    Total Timed Code Minutes- OT 10 minute(s)  -TM      OT Non-Billable Time (min) 30 min  -TM            User Key  (r) = Recorded By, (t) = Taken By, (c) = Cosigned By    Initials Name Provider Type    TM Jenny French OT Occupational Therapist                Therapy Charges for Today     Code Description Service Date Service Provider Modifiers Qty    84688701543 HC OT SELF CARE/MGMT/TRAIN EA 15 MIN 5/12/2022 Jenny French OT GO  1    28480101896 HC OT THERAPEUTIC ACT EA 15 MIN 5/12/2022 Jenny French OT GO 3    46789467730 HC OT THER PROC EA 15 MIN 5/12/2022 Jenny French OT GO 2    87372271651 HC OT SELF CARE/MGMT/TRAIN EA 15 MIN 5/13/2022 Jenny French OT GO 1               OT Discharge Summary  Reason for Discharge: Discharge from facility  Outcomes Achieved: Refer to plan of care for updates on goals achieved  Discharge Destination: Home with assist, Home with home health    Jenny French OT  5/13/2022

## 2022-05-13 NOTE — PROGRESS NOTES
Patient Assessment Instrument  Quality Indicators - Discharge    Section GG. Self-Care Performance     Eating: Patient completed the activities by him/herself with no assistance from  a helper.   Oral Hygiene: [place] sets up or cleans up; patient completes activity. [place]  assists only prior to or following the activity.   Toileting Hygiene: : [place] does less than half the effort. [place] lifts, holds  or supports trunk or limbs but provides less than half the effort.   Shower/Bathe Self: [place] does less than half the effort. [place] lifts, holds  or supports trunk or limbs but provides less than half the effort.   Upper Body Dressing: [place] sets up or cleans up; patient completes activity.  [place] assists only prior to or following the activity.   Lower Body Dressing: [place] provides verbal cues and/or touching/steadying  and/or contact guard assistance as patient completes activity.   Putting On/Taking Off Footwear: Not applicable.    Section GG. Mobility Performance      Section J. Health Conditions Discharge      Section M. Skin Conditions Discharge      . Current Number of Unhealed Pressure Ulcers      Section N. Medication    Signed by: Jenny French Occupational Therapist

## 2022-05-13 NOTE — SIGNIFICANT NOTE
05/13/22 0936   Plan   Plan Faxed verification of discharge form and facesheet to [facility] [phone].   Final Discharge Disposition Code 06 - home with home health care   Final Note Pt is being discharged home today with home health services.  [facility] will provide transportation home today.  Spouse will assist at home.

## 2022-05-13 NOTE — SIGNIFICANT NOTE
05/13/22 1054   Plan   Plan [facility] will be here to transport home at 1:00 PM.  The  will call [phone] when they arrive.

## 2022-05-13 NOTE — DISCHARGE SUMMARY
DISCHARGE SUMMARY        Patient Identification:  Name:  Syed Grajeda  Age:  57 y.o.  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          History of Presenting Illness     55 y/o male admitted due to complications from diabetic foot ulcer, cellulitis and osteomyelitis of R foot with exposed calcaneus. Cardiology consulted due to elevated CRP and elevated troponin of 105 and 106, mildly elevated BNP of 135 and creat of 2.08 and BUN of 46 on arrival. They were also consulted for preop cardiac clearance due to family his of heart complications and he was dx with nonischemic myocardia injury due to his complications with osteomyelitis and due to his family history with ECHO and stress test. Doppler of RLE was unable to detect arterial flow distal to the mid calf. Vascular surgery was consulted in order to determine course needed regarding his RLE complications and offered BKA, but patient initially declined, and IV abx recommendations were ordered. He did agree to undergo amputation and was taken of the OR on 22 for R AKA due to gangrene and PAD which now made patient B amputee as he had previous L BKA.     Patient continued to progress medically and physical therapy and Occupational Therapy evaluations were completed with recommended acute inpatient rehabilitation referral for continued functional mobility intervention and reeducation.  Acute rehab referral ordered for continued medical monitoring and management post prolonged hospitalization, continued respiratory  status monitoring, lab monitoring, pain management needs, bowel and bladder management with new medication reeducation, skin monitoring and breakdown prevention along with ongoing multiple comorbidities that require intervention for regulation.     The preadmission mini-FIM score as assessed by the referring facility is as follows: Eating 7; grooming 5; bathing 3; dressing-upper body 4; dressing-lower body 3; toileting 3; transfers: Bed, chair, wheelchair 3; transfers: Toilet 3; locomotion: Walk, wheelchair 0; bladder management 6; bowel management 6; comprehension 7; expression 7; social interaction 7; problem solving 7; memory 7.      Hospital Course     Status post right AKA with remote history of left BKA--participated with physical and occupational therapy, performs bed mobility activities and standby assist and verbal/nonverbal cues for comfort transfer activities with minimal to moderate assist and verbal/nonverbal cues; utilizes transfer board/wheelchair for chair to bed transfer; required minimal assist to contact-guard with verbal cues for lower body dressing; set up assist for grooming     Diabetes mellitus well controlled patient off insulin at this time     Diarrhea resolved     Thrombocytosis stable likely secondary to recent acute surgery     Anemia stable     Sacral decubitus wound continue local care     Hypertension controlled     Orthostatic hypotension-- continues to have orthostatic hypotension in the a.m. but improves throughout the day     Consider tapering off suboxone as patient has bilateral amputation now      Discharge Vitals/Physical Examination     Vital Signs:  Temp:  [98.2 °F (36.8 °C)] 98.2 °F (36.8 °C)  Heart Rate:  [82-86] 82  Resp:  [18-20] 20  BP: (106-112)/(60-64) 112/64  No data found.  SpO2 Percentage    05/10/22 1900 05/11/22 1900 05/12/22 1900   SpO2: 98% 99% 99%     SpO2:  [99 %] 99 %  on   ;   Device (Oxygen Therapy): room air    Body mass index is 46.39 kg/m².  Wt  Readings from Last 3 Encounters:   04/25/22 74.8 kg (164 lb 15.5 oz)   02/08/19 81.6 kg (180 lb)         Physical Exam:    Constitutional: Awake and alert, happy about going home  HEENT: Normocephalic atraumatic  Neck: Supple   Cardiovascular: Regular rate and rhythm  Pulmonary/Chest: Clear to auscultation  Abdominal: Positive bowel sounds soft.   Musculoskeletal: Status post right AKA, history of remote left BKA  Neurological: No focal deficits  Skin: Sacral decubitus wound is dressed          Pertinent Laboratory/Radiology Results     Pertinent Laboratory Results:              Results from last 7 days   Lab Units 05/08/22  0059   WBC 10*3/mm3 5.74   HEMOGLOBIN g/dL 7.7*   HEMATOCRIT % 25.4*   MCV fL 93.7   MCHC g/dL 30.3*   PLATELETS 10*3/mm3 333     Results from last 7 days   Lab Units 05/08/22  0059   SODIUM mmol/L 139   POTASSIUM mmol/L 4.0   CHLORIDE mmol/L 106   CO2 mmol/L 28.8   BUN mg/dL 13   CREATININE mg/dL 0.85   CALCIUM mg/dL 7.6*   GLUCOSE mg/dL 86   Estimated Creatinine Clearance: 72.5 mL/min (by C-G formula based on SCr of 0.85 mg/dL).  No results found for: AMMONIA    Glucose   Date/Time Value Ref Range Status   05/13/2022 1112 90 70 - 130 mg/dL Final     Comment:     Meter: [id number] : [id number] crystal gomez   05/12/2022 1608 101 70 - 130 mg/dL Final     Comment:     Meter: [id number] : [id number] [facility]   05/12/2022 1133 108 70 - 130 mg/dL Final     Comment:     Meter: [id number] : [id number] crystal gomez   05/12/2022 0601 77 70 - 130 mg/dL Final     Comment:     Meter: [id number] : [id number] Marlee Crystal   05/10/2022 1618 87 70 - 130 mg/dL Final     Comment:     Meter: [id number] : [id number] Gino Russo     No results found for: HGBA1C  No results found for: TSH, FREET4    No results found for: BLOODCX  No results found for: URINECX  No results found for: WOUNDCX  No results found for: STOOLCX  No results found for: RESPCX  Pain Management Panel     There is no flowsheet data to display.         Pertinent Radiology Results:    Imaging Results (All)     Procedure Component Value Units Date/Time    CT Abdomen Pelvis Without Contrast [[id number]] Collected: 04/28/22 1608     Updated: 04/28/22 1702    Narrative:      EXAM: CT ABDOMEN PELVIS WITHOUT CONTRAST-         TECHNIQUE: Multiple axial CT images were obtained from lung bases  through pubic symphysis WITHOUT administration of IV contrast.  Reformatted images in the coronal and/or sagittal plane(s) were  generated from the axial data set to facilitate diagnostic accuracy  and/or surgical planning.  Oral Contrast:NONE.     Radiation dose reduction techniques were utilized per ALARA protocol.  Automated exposure control was initiated through either or Yoovi or  Magic Leap software packages by  protocol.    DOSE:     Clinical information Abdominal pain, acute, nonlocalized;  Z89.611-Acquired absence of right leg above knee      Comparison none immediately available at this time     FINDINGS:     Lower thorax: Right middle lobe consolidation, right lower lobe  consolidation, and small bilateral pleural effusions, right worse than  left.     Abdomen:     Liver: Homogeneous. No focal hepatic mass or ductal dilatation.     Gallbladder: Surgically absent.     Pancreas: Unremarkable. No mass or ductal dilatation.     Spleen: Homogeneous. No splenomegaly.     Adrenals: No mass.     Kidneys/ureters: No mass. No obstructive uropathy.  No evidence of  urolithiasis.     GI tract: Moderate stool burden. There is no evidence of appendicitis     MESENTERY: No free fluid, walled off fluid collections, mesenteric  stranding, or enlarged lymph nodes         Vasculature: Evidence of atherosclerotic vascular disease.     Abdominal wall: Mild degree of anasarca.     Bladder: Thickening of the urinary bladder wall.     Reproductive: Unremarkable as visualized     Bones: Arthritic change.       Impression:         1.  Consolidation in the right lung and small bilateral effusions, right  greater than left.     2.Thickening of the urinary bladder wall.        3. Mild degree of anasarca.     4. Moderate to large volume stool.              This report was finalized on 4/28/2022 4:59 PM by Dr. Km Adams MD.       US Liver [[id number]] Collected: 04/27/22 1608     Updated: 04/27/22 1624    Narrative:      EXAMINATION: US LIVER-      CLINICAL INDICATION: nausea; Z89.611-Acquired absence of right leg above  knee        COMPARISON: None available.     FINDINGS: Sonographic imaging of the liver shows a homogeneous  echotexture of the liver. There is no intrahepatic ductal dilatation or  focal hepatic mass.     Hepatic flow was hepatopedal.       Impression:      Homogeneous echotexture of the liver.      This report was finalized on 4/27/2022 4:22 PM by Dr. Km Adams MD.             Test Results Pending at Discharge:        Discharge Disposition/Discharge Medications/Discharge Appointments     Discharge Disposition:     Home or Self Care    Code Status While Inpatient:    Code Status and Medical Interventions:   Ordered at: 04/25/22 1530     Code Status (Patient has no pulse and is not breathing):    CPR (Attempt to Resuscitate)     Medical Interventions (Patient has pulse or is breathing):    Full Support       Discharge Medications:       Discharge Medications      New Medications      Instructions Start Date   aspirin 81 MG chewable tablet   81 mg, Oral, Daily      famotidine 20 MG tablet  Commonly known as: PEPCID   20 mg, Oral, Daily      metoprolol tartrate 25 MG tablet  Commonly known as: LOPRESSOR   12.5 mg, Oral, Every 12 Hours Scheduled      multivitamin with minerals tablet tablet   1 tablet, Oral, Daily      Transfer Board misc   1 Units, Does not apply, Every 6 Hours PRN, Sliding board         Continue These Medications      Instructions Start Date   atorvastatin 40 MG tablet  Commonly known as: LIPITOR   40 mg, Oral,  Daily      buprenorphine-naloxone 8-2 MG per SL tablet  Commonly known as: SUBOXONE   2 tablets, Sublingual, Daily      ferrous sulfate 325 (65 FE) MG tablet   325 mg, Oral, 2 Times Daily         Stop These Medications    cholestyramine light 4 g packet     gabapentin 300 MG capsule  Commonly known as: NEURONTIN     hydrOXYzine pamoate 25 MG capsule  Commonly known as: VISTARIL     insulin glargine 100 UNIT/ML injection  Commonly known as: LANTUS, SEMGLEE     insulin regular 100 UNIT/ML injection  Commonly known as: humuLIN R,novoLIN R     pantoprazole 40 MG EC tablet  Commonly known as: PROTONIX     tamsulosin 0.4 MG capsule 24 hr capsule  Commonly known as: FLOMAX            Discharge Appointments:        Additional Instructions for the Follow-ups that You Need to Schedule     Ambulatory Referral to Home Health (Hospital)   As directed      Face to Face Visit Date: 5/13/2022    Follow-up provider for Plan of Care?: I treated the patient in an acute care facility and will not continue treatment after discharge.    Follow-up provider: MICHELL ALEJO [[id number]]    Reason/Clinical Findings: right AKA, Old left BKA, anemia, neuropathy    Describe mobility limitations that make leaving home difficult: taxing effort to leaving home    Nursing/Therapeutic Services Requested: Skilled Nursing Physical Therapy Occupational Therapy    Skilled nursing orders: Cardiopulmonary assessments Wound care dressing/changes Pain management Medication education    PT orders: Strengthening Therapeutic exercise Transfer training Home safety assessment    Occupational orders: Activities of daily living Home safety assessment Strengthening Cognition Fine motor    Frequency: 1 Week 1            Contact information for follow-up providers     Fran Ashley MD. Go on 5/23/2022.    Specialty: Vascular Surgery  Why: 11:30, phone # [phone]  Contact information:  [address]  [place] [address] [facility]  [address]  [phone]             Michell Alejo MD. Go on 5/18/2022.    Specialty: Family Medicine  Why: 10:15  Contact information:  [address] [facility] [address] [phone]                   Contact information for after-discharge care     Home Medical Care     [facility] .    Services: Home Health Services, Home Nursing, Home Rehabilitation  Contact information:  [address] [place] [id number] [phone]                             Additional Instructions for the Follow-ups that You Need to Schedule     Ambulatory Referral to Home Health (Hospital)   As directed      Face to Face Visit Date: 5/13/2022    Follow-up provider for Plan of Care?: I treated the patient in an acute care facility and will not continue treatment after discharge.    Follow-up provider: MICHELL ALEJO [[id number]]    Reason/Clinical Findings: right AKA, Old left BKA, anemia, neuropathy    Describe mobility limitations that make leaving home difficult: taxing effort to leaving home    Nursing/Therapeutic Services Requested: Skilled Nursing Physical Therapy Occupational Therapy    Skilled nursing orders: Cardiopulmonary assessments Wound care dressing/changes Pain management Medication education    PT orders: Strengthening Therapeutic exercise Transfer training Home safety assessment    Occupational orders: Activities of daily living Home safety assessment Strengthening Cognition Fine motor    Frequency: 1 Week 1               Condition at Discharge:    Stable, much improved with no issues today    Discharge Plan Of Care:    Patient is going with home health and will need for nursing evaluation for wound care to right AKA: clean with normal saline, apply adaptic, 4x4's, divine, ace wrap daily, wound care to coccyx, sacrum, bilateral buttocks, scrotum: clean with normal saline, apply therahoney, cover with 3 large mepilex to cover entire wound, PT 2-3 x wk x 8 wks for  strengthening, endurance, transfer training, balance, therapeutic exercise, bed mobility, home safety, OT 2-3 x wk x 8 wks for ADL re-training, home safety, functional mobility, and strengthening.    Recommend to check CBC, CMP with PCP on Monday.            Please note that this discharge summary required more than 30 minutes to complete.          Elizabeth Mckeon MD  05/13/22  12:23 EDT

## 2022-05-13 NOTE — PROGRESS NOTES
Patient Assessment Instrument  Quality Indicators - Discharge    Section GG. Self-Care Performance      Section GG. Mobility Performance     Roll Left and Right: [place] provides verbal cues and/or touching/steadying  and/or contact guard assistance as patient completes activity. Assistance may be  provided throughout the activity or intermittently.   Sit to Lying: [place] provides verbal cues and/or touching/steadying and/or  contact guard assistance as patient completes activity. Assistance may be  provided throughout the activity or intermittently.   Lying to Sitting on Side of Bed: [place] provides verbal cues and/or  touching/steadying and/or contact guard assistance as patient completes  activity. Assistance may be provided throughout the activity or intermittently.   Sit to Stand: Not applicable.   Chair/Bed to Chair Transfer: [place] does less than half the effort. [place]  lifts, holds or supports trunk or limbs but provides less than half the effort.   Car Transfer: [place] does less than half the effort. [place] lifts, holds or  supports trunk or limbs but provides less than half the effort.   Walk 10 Feet:   Not applicable.  1 Step Over Curb or Up/Down Stair:   Not applicable.  Picking up an Object:   Not applicable. Uses Wheelchair and/or Scooter: Yes  Wheel 50 Feet with Two Turns: Patient completed the activities by him/herself  with no assistance from a helper.  Type of Wheelchair or Scooter Used: Manual  Wheel 150 Feet: Patient completed the activities by him/herself with no  assistance from a helper.  Type of Wheelchair or Scooter Used: Manual    Section J. Health Conditions Discharge      Section M. Skin Conditions Discharge      . Current Number of Unhealed Pressure Ulcers      Section N. Medication    Signed by: Adam Goodman PTA

## 2022-05-13 NOTE — PROGRESS NOTES
Occupational Therapy: Individual: 10 minutes.    Physical Therapy:    Speech Language Pathology:    Signed by: Jenny French, Occupational Therapist

## 2022-05-13 NOTE — PROGRESS NOTES
Patient Assessment Instrument  Quality Indicators - Discharge    Section GG. Self-Care Performance      Section GG. Mobility Performance      Section J. Health Conditions Discharge      Section M. Skin Conditions Discharge  Unhealed Pressure Ulcer(s)/Injurie(s) at Stage 1 or Higher:  Yes.    . Current Number of Unhealed Pressure Ulcers    Number of Unhealed Stage 2: 4  Number of Unhealed Stage 2 at Admission: 4  Number of Unhealed Stage 3: 0  Number of Unhealed Stage 4: 0    Section N. Medication    Signed by: Marry Brooks Nurse

## 2022-05-13 NOTE — SIGNIFICANT NOTE
05/13/22 1421   PT Discharge Summary   Reason for Discharge Discharge from facility   Outcomes Achieved Patient able to partially acheive established goals   Discharge Destination Home with assist

## 2022-05-16 NOTE — PROGRESS NOTES
Occupational Therapy: Individual: 90 minutes.    Physical Therapy:    Speech Language Pathology:    Signed by: Lisa Gutierrez, Supervisor

## 2022-05-16 NOTE — PROGRESS NOTES
PPS CMG Coordinator  Inpatient Rehabilitation Discharge    Mode of Locomotion: Wheelchair.    Discharge Against Medical Advice:  No.  Discharge Information  Patient Discharged Alive:  Yes  Discharge Destination/Living Setting: Home with Home Health Services  Diagnosis for Interruption/Death:    Impairment Group: 05.6 Bilateral Lower Limb Above/Below the Knee    Comorbidities: Rank Code      Description      1    A41.9     Sepsis, unspecified organism  2    J69.0     Pneumonitis due to inhalation of food and vomit  3    M86.9     Osteomyelitis, unspecified  4    E11.69    Type 2 diabetes mellitus with other specified                 complication  5    Z87.891   Personal history of nicotine dependence  6    Z79.4     Long term (current) use of insulin  7    L89.159   Pressure ulcer of sacral region, unspecified                 stage  8    Z89.512   Acquired absence of left leg below knee  9    Z89.611   Acquired absence of right leg above knee  10   I10       Essential (primary) hypertension  11   G62.9     Polyneuropathy, unspecified  12   D64.9     Anemia, unspecified    Complications:      SAMMY Bladder Accidents: 0  - Accidents.  Bladder Score = 5.  One (1) bladder accident.  SAMMY Bowel Accident: 0  - Accidents.  Bowel Score = 1.  Five (5) or more  bowel accidents.    Signed by: Siri Calhoun Nurse

## 2022-05-16 NOTE — PROGRESS NOTES
Patient Assessment Instrument  Quality Indicators - Discharge    Section GG. Self-Care Performance      Section GG. Mobility Performance      Section J. Health Conditions Discharge  Fall(s) Since Admission:  No    Section M. Skin Conditions Discharge  Unhealed Pressure Ulcer(s)/Injurie(s) at Stage 1 or Higher:  Yes.    . Current Number of Unhealed Pressure Ulcers    Number of Unhealed Stage 1 Pressure Injuries: 0  Number of Unhealed Stage 2: 3  Number of Unhealed Stage 2 at Admission: 3  Number of Unhealed Stage 3: 0  Number of Unhealed Stage 4: 0  Number of Unhealed Unstageable Due to Non-removable Dressing/Device: 0  Number of Unhealed Unstageable Due to Slough/Eschar: 0  Number of Unhealed Unstageable Injuries Presenting as Deep Tissue Injury: 0    Section N. Medication    Medication Intervention: N/A - There were no potential clinically significant  medication issues identified since admission or patient is not taking any  medications.    Signed by: Lisa Gutierrez, Supervisor

## (undated) DEVICE — ENCORE® LATEX MICRO SIZE 7, STERILE LATEX POWDER-FREE SURGICAL GLOVE: Brand: ENCORE

## (undated) DEVICE — HOLDER: Brand: DEROYAL

## (undated) DEVICE — PK PHACO 70

## (undated) DEVICE — CARTRDG LENS MONARCHII C IOL DS

## (undated) DEVICE — PK TBG FUSN PHACO WHITESTAR SIGN

## (undated) DEVICE — FLEXIBLE IRIS RETRACTORS, 1 UNIT AT 5 HOOKS: Brand: ALCON GRIESHABER